# Patient Record
Sex: FEMALE | Race: WHITE | NOT HISPANIC OR LATINO
[De-identification: names, ages, dates, MRNs, and addresses within clinical notes are randomized per-mention and may not be internally consistent; named-entity substitution may affect disease eponyms.]

---

## 2017-01-04 ENCOUNTER — RX ONLY (OUTPATIENT)
Age: 62
Setting detail: RX ONLY
End: 2017-01-04

## 2017-04-07 DIAGNOSIS — Z01.812 PRE-OPERATIVE LABORATORY EXAMINATION: ICD-10-CM

## 2017-04-07 DIAGNOSIS — Z01.810 PRE-OPERATIVE CARDIOVASCULAR EXAMINATION: ICD-10-CM

## 2017-04-07 LAB
ANION GAP SERPL CALC-SCNC: 9 MMOL/L (ref 0–11.9)
BUN SERPL-MCNC: 11 MG/DL (ref 8–22)
CALCIUM SERPL-MCNC: 9.3 MG/DL (ref 8.4–10.2)
CHLORIDE SERPL-SCNC: 106 MMOL/L (ref 96–112)
CO2 SERPL-SCNC: 25 MMOL/L (ref 20–33)
CREAT SERPL-MCNC: 0.43 MG/DL (ref 0.5–1.4)
EKG IMPRESSION: NORMAL
ERYTHROCYTE [DISTWIDTH] IN BLOOD BY AUTOMATED COUNT: 39.9 FL (ref 35.9–50)
GFR SERPL CREATININE-BSD FRML MDRD: >60 ML/MIN/1.73 M 2
GLUCOSE SERPL-MCNC: 192 MG/DL (ref 65–99)
HCT VFR BLD AUTO: 45.1 % (ref 37–47)
HGB BLD-MCNC: 15.9 G/DL (ref 12–16)
MCH RBC QN AUTO: 30.2 PG (ref 27–33)
MCHC RBC AUTO-ENTMCNC: 35.3 G/DL (ref 33.6–35)
MCV RBC AUTO: 85.7 FL (ref 81.4–97.8)
PLATELET # BLD AUTO: 273 K/UL (ref 164–446)
PMV BLD AUTO: 9.8 FL (ref 9–12.9)
POTASSIUM SERPL-SCNC: 4.2 MMOL/L (ref 3.6–5.5)
RBC # BLD AUTO: 5.26 M/UL (ref 4.2–5.4)
SODIUM SERPL-SCNC: 140 MMOL/L (ref 135–145)
WBC # BLD AUTO: 7.8 K/UL (ref 4.8–10.8)

## 2017-04-07 PROCEDURE — 36415 COLL VENOUS BLD VENIPUNCTURE: CPT

## 2017-04-07 PROCEDURE — 85027 COMPLETE CBC AUTOMATED: CPT

## 2017-04-07 PROCEDURE — 80048 BASIC METABOLIC PNL TOTAL CA: CPT

## 2017-04-07 RX ORDER — RANITIDINE 150 MG/1
150 TABLET ORAL PRN
COMMUNITY
End: 2020-01-03

## 2017-04-07 RX ORDER — INSULIN GLARGINE 100 [IU]/ML
32 INJECTION, SOLUTION SUBCUTANEOUS 2 TIMES DAILY
COMMUNITY
End: 2020-01-03

## 2017-04-07 RX ORDER — ALBUTEROL SULFATE 90 UG/1
2 AEROSOL, METERED RESPIRATORY (INHALATION) EVERY 6 HOURS PRN
COMMUNITY
End: 2018-04-05

## 2017-04-07 NOTE — OR NURSING
Pre-admit appointment completed. Pt instructed to continue regularly prescribed medications through the day before surgery. Pt instructed to take the following medications the day of surgery with a sip of water, per anesthesia protocol; If needed-albuterol MDI and zantac.     Pt to bring rescue inhaler DOS.     Pt's AM glucoses run . Pt instructed to discuss Lantus dose day prior to procedure due to NPO status.

## 2017-04-11 ENCOUNTER — APPOINTMENT (OUTPATIENT)
Dept: ADMISSIONS | Facility: MEDICAL CENTER | Age: 62
End: 2017-04-11
Payer: MEDICARE

## 2017-04-13 ENCOUNTER — HOSPITAL ENCOUNTER (OUTPATIENT)
Facility: MEDICAL CENTER | Age: 62
End: 2017-04-13
Attending: INTERNAL MEDICINE | Admitting: INTERNAL MEDICINE
Payer: MEDICARE

## 2017-04-13 VITALS
TEMPERATURE: 97.7 F | RESPIRATION RATE: 16 BRPM | DIASTOLIC BLOOD PRESSURE: 87 MMHG | OXYGEN SATURATION: 95 % | WEIGHT: 161.38 LBS | HEIGHT: 62 IN | SYSTOLIC BLOOD PRESSURE: 144 MMHG | BODY MASS INDEX: 29.7 KG/M2

## 2017-04-13 PROBLEM — C25.9 PANCREATIC CANCER (HCC): Status: ACTIVE | Noted: 2017-04-13

## 2017-04-13 LAB — GLUCOSE BLD-MCNC: 116 MG/DL (ref 65–99)

## 2017-04-13 PROCEDURE — 160002 HCHG RECOVERY MINUTES (STAT): Performed by: INTERNAL MEDICINE

## 2017-04-13 PROCEDURE — A4606 OXYGEN PROBE USED W OXIMETER: HCPCS | Performed by: INTERNAL MEDICINE

## 2017-04-13 PROCEDURE — 502240 HCHG MISC OR SUPPLY RC 0272: Performed by: INTERNAL MEDICINE

## 2017-04-13 PROCEDURE — 700111 HCHG RX REV CODE 636 W/ 250 OVERRIDE (IP)

## 2017-04-13 PROCEDURE — 700101 HCHG RX REV CODE 250

## 2017-04-13 PROCEDURE — 700105 HCHG RX REV CODE 258: Performed by: INTERNAL MEDICINE

## 2017-04-13 PROCEDURE — 82962 GLUCOSE BLOOD TEST: CPT

## 2017-04-13 PROCEDURE — 160035 HCHG PACU - 1ST 60 MINS PHASE I: Performed by: INTERNAL MEDICINE

## 2017-04-13 PROCEDURE — 160036 HCHG PACU - EA ADDL 30 MINS PHASE I: Performed by: INTERNAL MEDICINE

## 2017-04-13 PROCEDURE — 160025 RECOVERY II MINUTES (STATS): Performed by: INTERNAL MEDICINE

## 2017-04-13 PROCEDURE — 160046 HCHG PACU - 1ST 60 MINS PHASE II: Performed by: INTERNAL MEDICINE

## 2017-04-13 PROCEDURE — 160009 HCHG ANES TIME/MIN: Performed by: INTERNAL MEDICINE

## 2017-04-13 PROCEDURE — 160203 HCHG ENDO MINUTES - 1ST 30 MINS LEVEL 4: Performed by: INTERNAL MEDICINE

## 2017-04-13 PROCEDURE — 500066 HCHG BITE BLOCK, ECT: Performed by: INTERNAL MEDICINE

## 2017-04-13 PROCEDURE — 160048 HCHG OR STATISTICAL LEVEL 1-5: Performed by: INTERNAL MEDICINE

## 2017-04-13 RX ORDER — SODIUM CHLORIDE 9 MG/ML
1000 INJECTION, SOLUTION INTRAVENOUS
Status: DISCONTINUED | OUTPATIENT
Start: 2017-04-13 | End: 2017-04-13 | Stop reason: HOSPADM

## 2017-04-13 RX ORDER — LIDOCAINE HYDROCHLORIDE 10 MG/ML
INJECTION, SOLUTION INFILTRATION; PERINEURAL
Status: DISCONTINUED
Start: 2017-04-13 | End: 2017-04-13 | Stop reason: HOSPADM

## 2017-04-13 RX ADMIN — SODIUM CHLORIDE 1000 ML: 900 INJECTION, SOLUTION INTRAVENOUS at 06:53

## 2017-04-13 ASSESSMENT — PAIN SCALES - GENERAL
PAINLEVEL_OUTOF10: 0

## 2017-04-13 NOTE — IP AVS SNAPSHOT
4/13/2017    Denise Gaines  [address] [address]  [facility] [address]    Dear Denise:    [facility] wants to ensure your discharge home is safe and you or your loved ones have had all of your questions answered regarding your care after you leave the hospital.    Below is a list of resources and contact information should you have any questions regarding your hospital stay, follow-up instructions, or active medical symptoms.    Questions or Concerns Regarding… Contact   Medical Questions Related to Your Discharge  (7 days a week, 8am-5pm) Contact a Nurse Care Coordinator   [phone]   Medical Questions Not Related to Your Discharge  (24 hours a day / 7 days a week)  Contact the Nurse Health Line   [phone]    Medications or Discharge Instructions Refer to your discharge packet   or contact your [facility] Primary Care Provider   [phone]   Follow-up Appointment(s) Schedule your appointment via ieCrowd   or contact Scheduling [phone]   Billing Review your statement via ieCrowd  or contact Billing [phone]   Medical Records Review your records via ieCrowd   or contact Medical Records [phone]     You may receive a telephone call within two days of discharge. This call is to make certain you understand your discharge instructions and have the opportunity to have any questions answered. You can also easily access your medical information, test results and upcoming appointments via the ieCrowd free online health management tool. You can learn more and sign up at [employer]/ieCrowd. For assistance setting up your ieCrowd account, please call [phone].    Once again, we want to ensure your discharge home is safe and that you have a clear understanding of any next steps in your care. If you have any questions or concerns, please do not hesitate to contact us, we are here for you. Thank you for choosing [facility] for your healthcare needs.    Sincerely,    Your [facility] Healthcare Team

## 2017-04-13 NOTE — OR NURSING
0839 Patient to pacu via cart. Placed on monitors. Oral airway remains in place. Spontaneous respirations.   0849 Oral airway removed. Patient awake and alert.  0854 Patient awake, denies any pain at this time. Resting comfortably on her side.  0910 Warm wash cloth placed to patients forehead. States she has a headache and feels congested. States she felt this way prior to procedure too.  0926 Patient taking sips of water.   0940 Patients family aware of patients disposition.   0955 Report to Katarzyna to assume care of patient in stage 2.

## 2017-04-13 NOTE — OP REPORT
DATE OF SERVICE:  04/13/2017    PROCEDURES PERFORMED:  1.  Esophagogastrojejunoscopy.  2.  Endoscopic ultrasound of pancreas.    INDICATION FOR PROCEDURE:  This is a 61-year-old female with history of   pancreatic cancer, status post Whipple procedure.  She presents for   surveillance endoscopic ultrasound.    POST-PROCEDURE DIAGNOSES:  1.  Normal esophagogastrojejunoscopy with post-surgical anatomy consistent   with gastrojejunal anastomosis, appearing healthy and intact with appreciation   of normal afferent and efferent limbs.  2.  Endoscopic ultrasound of the pancreas, examination of the body and tail of   the pancreas appearing within normal limits.    PHYSICIAN:  Edward Rubin MD.    ANESTHESIOLOGIST:  Juice Vázquez MD.    MEDICATIONS:  Deep sedation.    CONSENT:  Procedure risks and benefits reviewed thoroughly with the patient,   risks including but not limited to bleeding, perforation, side effects of   medication were informed.  Patient voiced understanding and agreed to proceed.    DESCRIPTION OF PROCEDURE:  Patient was placed in a left lateral decubitus   position after sedation was achieved, the bite block was inserted in the mouth   and a forward viewing gastroscope was passed carefully and easily under   direct visualization into the esophagus.  The esophagus was normal.  The GE   junction was regular.  The scope was advanced into the stomach.  The stomach   extended from 35-50 cm.  There were post-surgical changes secondary to a   distal gastrectomy and a subsequent gastrojejunal anastomosis with both   afferent and efferent limbs.  Both these limbs and their anastomoses to the   stomach appeared healthy and intact.  Examination briefly into the limbs was   normal.  Please note the limited volume of the stomach being only 15 cm in   length.  The scope was removed.    ENDOSCOPIC ULTRASOUND:  A side-viewing radial echoendoscope was passed   carefully and easily under direct vision to the esophagus.   It was passed to   the GE junction station.  There, the aorta, celiac artery and superior   mesenteric artery take offs from the aorta appeared normal.  The celiac artery   and the celiac axis revealed no evidence for lymphadenopathy.  The left   adrenal gland and left kidney were normal.  The celiac artery and its   divergence into the hepatic and splenic arteries allowed for visualization of   the body and tail of the pancreas.  The body and tail of the pancreas appeared   normal, very mild atrophy.  The pancreatic duct was 0.27 cm in length.  There   appeared to be a tubular structure within the pancreatic duct likely a   pancreatic duct stent.  Otherwise, there was no evidence for any mass effect   or chronic pancreatitis type changes.  The tail of pancreas as it laid next to   the splenic hilum and the spleen were within normal limits.  The splenic vein   and its confluence to the superior mesenteric vein to form the portal vein   appeared within normal limits.  This station as well as the left hepatic lobe   were normal.  The scope was advanced distally into the stomach.  Attempts at   identifying the post-surgical bed revealed no akua abnormalities and the   scope was removed.    COMPLICATIONS:  None.    BLOOD LOSS:  None.    SPECIMENS:  None.    RECOMMENDATIONS:  1.  Normal EUS.  2.  Please note the patient's residual stomach is only 15 cm in length and she   is prone to reflux due to this limited gastric anatomy.  Antireflux measures   reviewed thoroughly with the patient.  If she has any questions she may follow   up in the clinic for further recommendations.       ____________________________________     MD VICKY Hinojosa / FREDI    DD:  04/13/2017 09:31:26  DT:  04/13/2017 09:52:10    D#:  [id number]  Job#:  [id number]    cc: EZE DARBY DO

## 2017-04-13 NOTE — IP AVS SNAPSHOT
" Home Care Instructions                                                                                                                Name:Denise Gaines  Medical Record Number:[id number]  CSN: [id number]    YOB: 1955   Age: 61 y.o.  Sex: female  HT:1.575 m (5' 2.01\") WT: 73.2 kg (161 lb 6 oz)          Admit Date: 4/13/2017     Discharge Date:   Today's Date: 4/13/2017  Attending Doctor:  Edwrad Rubin M.D.                  Allergies:  Bee; Clindamycin; Codeine; Penicillins; Percocet; Sulfa drugs; and Vicodin                Discharge Instructions       ENDOSCOPY HOME CARE INSTRUCTIONS    GASTROSCOPY OR ERCP  1. Don't eat or drink anything for about an hour after the test. You can then resume your regular diet.  2. Don't drive or drink alcohol for 24 hours. The medication you received will make you too drowsy.  3. Don't take any coffee, tea, or aspirin products until after you see your doctor. These can harm the lining of your stomach.  4. If you begin to vomit bloody material, or develop black or bloody stools, call your doctor as soon as possible.  5. If you have any neck, chest, abdominal pain or temp of 100 degrees, call your doctor.  6. See your doctor for a follow up appointment.    Dr. Rubin - [phone]      You should call [phone] if you develop problems with breathing or chest pain.  If any questions arise, call your doctor. If your doctor is not available, please feel free to call {Endoscopy Dept Numbers:[id number]}. You can also call the HEALTH HOTLINE open 24 hours/day, 7 days/week and speak to a nurse at [phone], or toll free [phone].    Depression / Suicide Risk    As you are discharged from this [facility] facility, it is important to learn how to keep safe from harming yourself.    Recognize the warning signs:  · Abrupt changes in personality, positive or negative- including increase in energy   · Giving away possessions  · Change in eating patterns- significant weight " changes-  positive or negative  · Change in sleeping patterns- unable to sleep or sleeping all the time   · Unwillingness or inability to communicate  · Depression  · Unusual sadness, discouragement and loneliness  · Talk of wanting to die  · Neglect of personal appearance   · Rebelliousness- reckless behavior  · Withdrawal from people/activities they love  · Confusion- inability to concentrate     If you or a loved one observes any of these behaviors or has concerns about self-harm, here's what you can do:  · Talk about it- your feelings and reasons for harming yourself  · Remove any means that you might use to hurt yourself (examples: pills, rope, extension cords, firearm)  · Get professional help from the community (Mental Health, Substance Abuse, psychological counseling)  · Do not be alone:Call your Safe Contact- someone whom you trust who will be there for you.  · Call your local CRISIS HOTLINE 857-6894 or 584-849-8692  · Call your local Children's Mobile Crisis Response Team [place] (020) 842-2285 or www.BIOSAFE  · Call the toll free National Suicide Prevention Hotlines   · National Suicide Prevention Lifeline 930-469-CCWD (7937)  · National Hope Line Network 800-SUICIDE (380-4768)    I acknowledge receipt and understanding of these Home Care Instructions.       Medication List      ASK your doctor about these medications        Instructions    Morning Afternoon Evening Bedtime    albuterol 108 (90 BASE) MCG/ACT Aers inhalation aerosol        Inhale 2 Puffs by mouth every 6 hours as needed for Shortness of Breath.   Dose:  2 Puff                        ALLEGRA PO        Take  by mouth as needed.                        aspirin EC 81 MG Tbec   Commonly known as:  ECOTRIN        Take 81 mg by mouth every day.   Dose:  81 mg                        atorvastatin 10 MG Tabs   Commonly known as:  LIPITOR        Take 10 mg by mouth every evening.   Dose:  10 mg                        CREON 00182 UNITS  Cpep   Generic drug:  Pancrelipase (Lip-Prot-Amyl)        Take 2 Tabs by mouth 3 times a day, with meals.   Dose:  2 Tab                        fluticasone 50 MCG/ACT nasal spray   Commonly known as:  FLONASE        Spray 2 Sprays in nose 2 times a day.   Dose:  2 Spray                        GAS-X PO        Take  by mouth.                        insulin glargine 100 UNIT/ML Soln   Commonly known as:  LANTUS        Inject 25 Units as instructed every evening. TAKES at 3:30pm   Dose:  25 Units                        MUCINEX D PO        Take  by mouth as needed.                        MULTIVITAMIN ADULT PO        Take  by mouth every day.                        NEXIUM 40 MG delayed-release capsule   Generic drug:  esomeprazole        Take 40 mg by mouth every evening.   Dose:  40 mg                        PROBIOTIC ADVANCED PO        Take  by mouth every day.                        ranitidine 150 MG Tabs   Commonly known as:  ZANTAC        Take 150 mg by mouth as needed for Heartburn.   Dose:  150 mg                        ZOLOFT 100 MG Tabs   Generic drug:  sertraline        Take 150 mg by mouth every morning.   Dose:  150 mg                                Medication Information     Above and/or attached are the medications your physician expects you to take upon discharge. Review all of your home medications and newly ordered medications with your doctor and/or pharmacist. Follow medication instructions as directed by your doctor and/or pharmacist. Please keep your medication list with you and share with your physician. Update the information when medications are discontinued, doses are changed, or new medications (including over-the-counter products) are added; and carry medication information at all times in the event of emergency situations.        Resources     Quit Smoking / Tobacco Use:    I understand the use of any tobacco products increases my chance of suffering from future heart disease or stroke and  could cause other illnesses which may shorten my life. Quitting the use of tobacco products is the single most important thing I can do to improve my health. For further information on smoking / tobacco cessation call a Toll Free Quit Line at 1-440.645.1008 (*National Cancer [place]) or 1-493.320.8746 (American Lung Association) or you can access the web based program at www.lungusa.org.    [place] Tobacco Users Help Line:  (153) 939-2990       Toll Free: 1-509.922.6447  Quit Tobacco Program [facility] (823)178-3831    Crisis Hotline:    [place] Crisis Hotline:  4-965-PAEUMGP or 1-268.911.2157    [place] Crisis Hotline:    1-795.111.6827 or 447-746-2363    Discharge Survey:   Thank you for choosing [facility]. We hope we did everything we could to make your hospital stay a pleasant one. You may be receiving a survey and we would appreciate your time and participation in answering the questions. Your input is very valuable to us in our efforts to improve our service to our patients and their families.            Signatures     My signature on this form indicates that:    1. I acknowledge receipt and understanding of these Home Care Instruction.  2. My questions regarding this information have been answered to my satisfaction.  3. I have formulated a plan with my discharge nurse to obtain my prescribed medications for home.    __________________________________      __________________________________                   Patient Signature                                 Guardian/Responsible Adult Signature      __________________________________                 __________       ________                       Nurse Signature                                               Date                 Time

## 2017-04-13 NOTE — DISCHARGE INSTRUCTIONS
ENDOSCOPY HOME CARE INSTRUCTIONS    GASTROSCOPY OR ERCP  1. Don't eat or drink anything for about an hour after the test. You can then resume your regular diet.  2. Don't drive or drink alcohol for 24 hours. The medication you received will make you too drowsy.  3. Don't take any coffee, tea, or aspirin products until after you see your doctor. These can harm the lining of your stomach.  4. If you begin to vomit bloody material, or develop black or bloody stools, call your doctor as soon as possible.  5. If you have any neck, chest, abdominal pain or temp of 100 degrees, call your doctor.  6. See your doctor for a follow up appointment.    Dr. Rubin - [phone]      You should call [phone] if you develop problems with breathing or chest pain.  If any questions arise, call your doctor. If your doctor is not available, please feel free to call {Endoscopy Dept Numbers:[id number]}. You can also call the HEALTH HOTLINE open 24 hours/day, 7 days/week and speak to a nurse at [phone], or toll free [phone].    Depression / Suicide Risk    As you are discharged from this [facility] facility, it is important to learn how to keep safe from harming yourself.    Recognize the warning signs:  · Abrupt changes in personality, positive or negative- including increase in energy   · Giving away possessions  · Change in eating patterns- significant weight changes-  positive or negative  · Change in sleeping patterns- unable to sleep or sleeping all the time   · Unwillingness or inability to communicate  · Depression  · Unusual sadness, discouragement and loneliness  · Talk of wanting to die  · Neglect of personal appearance   · Rebelliousness- reckless behavior  · Withdrawal from people/activities they love  · Confusion- inability to concentrate     If you or a loved one observes any of these behaviors or has concerns about self-harm, here's what you can do:  · Talk about it- your feelings and reasons for harming  yourself  · Remove any means that you might use to hurt yourself (examples: pills, rope, extension cords, firearm)  · Get professional help from the community (Mental Health, Substance Abuse, psychological counseling)  · Do not be alone:Call your Safe Contact- someone whom you trust who will be there for you.  · Call your local CRISIS HOTLINE 483-7339 or 672-740-6876  · Call your local Children's Mobile Crisis Response Team [place] (087) 961-0255 or www.[place]  · Call the toll free National Suicide Prevention Hotlines   · National Suicide Prevention Lifeline 662-566-JROM (5569)  · National Hope Line Network 800-SUICIDE (871-1228)    I acknowledge receipt and understanding of these Home Care Instructions.

## 2017-04-24 ENCOUNTER — APPOINTMENT (OUTPATIENT)
Dept: RADIOLOGY | Facility: MEDICAL CENTER | Age: 62
End: 2017-04-24
Attending: ORTHOPAEDIC SURGERY
Payer: MEDICARE

## 2017-04-24 DIAGNOSIS — M25.511 RIGHT SHOULDER PAIN, UNSPECIFIED CHRONICITY: ICD-10-CM

## 2017-04-24 PROCEDURE — 73221 MRI JOINT UPR EXTREM W/O DYE: CPT | Mod: RT

## 2017-07-03 ENCOUNTER — HOSPITAL ENCOUNTER (OUTPATIENT)
Dept: RADIOLOGY | Facility: MEDICAL CENTER | Age: 62
End: 2017-07-03

## 2017-07-04 ENCOUNTER — HOSPITAL ENCOUNTER (OUTPATIENT)
Dept: RADIOLOGY | Facility: MEDICAL CENTER | Age: 62
End: 2017-07-04

## 2017-07-05 ENCOUNTER — HOSPITAL ENCOUNTER (OUTPATIENT)
Dept: RADIOLOGY | Facility: MEDICAL CENTER | Age: 62
End: 2017-07-05

## 2017-08-22 ENCOUNTER — HOSPITAL ENCOUNTER (OUTPATIENT)
Dept: RADIOLOGY | Facility: MEDICAL CENTER | Age: 62
End: 2017-08-22
Attending: FAMILY MEDICINE
Payer: MEDICARE

## 2017-08-22 DIAGNOSIS — M81.0 SENILE OSTEOPOROSIS: ICD-10-CM

## 2017-08-22 DIAGNOSIS — Z12.31 VISIT FOR SCREENING MAMMOGRAM: ICD-10-CM

## 2017-08-22 PROCEDURE — 77063 BREAST TOMOSYNTHESIS BI: CPT

## 2017-08-22 PROCEDURE — 77080 DXA BONE DENSITY AXIAL: CPT

## 2017-11-02 PROCEDURE — 90715 TDAP VACCINE 7 YRS/> IM: CPT | Performed by: PREVENTIVE MEDICINE

## 2017-11-03 ENCOUNTER — HOSPITAL ENCOUNTER (OUTPATIENT)
Dept: LAB | Facility: MEDICAL CENTER | Age: 62
End: 2017-11-03
Attending: PREVENTIVE MEDICINE
Payer: COMMERCIAL

## 2017-11-03 ENCOUNTER — EH NON-PROVIDER (OUTPATIENT)
Dept: OCCUPATIONAL MEDICINE | Facility: CLINIC | Age: 62
End: 2017-11-03

## 2017-11-03 DIAGNOSIS — Z02.89 VISIT FOR OCCUPATIONAL HEALTH EXAMINATION: ICD-10-CM

## 2017-11-03 PROCEDURE — 86762 RUBELLA ANTIBODY: CPT | Performed by: PREVENTIVE MEDICINE

## 2017-11-03 PROCEDURE — 86735 MUMPS ANTIBODY: CPT | Performed by: PREVENTIVE MEDICINE

## 2017-11-03 PROCEDURE — 86480 TB TEST CELL IMMUN MEASURE: CPT | Performed by: PREVENTIVE MEDICINE

## 2017-11-03 PROCEDURE — 86765 RUBEOLA ANTIBODY: CPT | Performed by: PREVENTIVE MEDICINE

## 2017-11-04 LAB
MEV IGG SER IA-ACNC: POSITIVE
MUV IGG SER IA-ACNC: POSITIVE
RUBV AB SER QL: 29.6 IU/ML

## 2017-11-05 LAB
M TB TUBERC IFN-G BLD QL: NEGATIVE
M TB TUBERC IFN-G/MITOGEN IGNF BLD: 0.02
M TB TUBERC IGNF/MITOGEN IGNF CONTROL: 39.74 [IU]/ML
MITOGEN IGNF BCKGRD COR BLD-ACNC: 0.04 [IU]/ML

## 2018-02-26 ENCOUNTER — HOSPITAL ENCOUNTER (OUTPATIENT)
Dept: LAB | Facility: MEDICAL CENTER | Age: 63
End: 2018-02-26
Attending: INTERNAL MEDICINE
Payer: MEDICARE

## 2018-02-26 LAB
BUN SERPL-MCNC: 12 MG/DL (ref 8–22)
CREAT SERPL-MCNC: 0.58 MG/DL (ref 0.5–1.4)

## 2018-02-26 PROCEDURE — 82565 ASSAY OF CREATININE: CPT

## 2018-02-26 PROCEDURE — 36415 COLL VENOUS BLD VENIPUNCTURE: CPT

## 2018-02-26 PROCEDURE — 84520 ASSAY OF UREA NITROGEN: CPT

## 2018-02-27 ENCOUNTER — HOSPITAL ENCOUNTER (OUTPATIENT)
Dept: RADIOLOGY | Facility: MEDICAL CENTER | Age: 63
End: 2018-02-27
Attending: INTERNAL MEDICINE
Payer: MEDICARE

## 2018-02-27 ENCOUNTER — HOSPITAL ENCOUNTER (OUTPATIENT)
Dept: LAB | Facility: MEDICAL CENTER | Age: 63
End: 2018-02-27
Attending: INTERNAL MEDICINE
Payer: MEDICARE

## 2018-02-27 DIAGNOSIS — C25.9 MALIGNANT NEOPLASM OF PANCREAS, UNSPECIFIED LOCATION OF MALIGNANCY (HCC): ICD-10-CM

## 2018-02-27 LAB
ALBUMIN SERPL BCP-MCNC: 3.4 G/DL (ref 3.2–4.9)
ALBUMIN/GLOB SERPL: 0.8 G/DL
ALP SERPL-CCNC: 106 U/L (ref 30–99)
ALT SERPL-CCNC: 17 U/L (ref 2–50)
AMYLASE SERPL-CCNC: 10 U/L (ref 20–103)
ANION GAP SERPL CALC-SCNC: 10 MMOL/L (ref 0–11.9)
AST SERPL-CCNC: 25 U/L (ref 12–45)
BASOPHILS # BLD AUTO: 0.5 % (ref 0–1.8)
BASOPHILS # BLD: 0.07 K/UL (ref 0–0.12)
BILIRUB SERPL-MCNC: 0.5 MG/DL (ref 0.1–1.5)
BUN SERPL-MCNC: 13 MG/DL (ref 8–22)
CALCIUM SERPL-MCNC: 8.9 MG/DL (ref 8.5–10.5)
CHLORIDE SERPL-SCNC: 98 MMOL/L (ref 96–112)
CO2 SERPL-SCNC: 28 MMOL/L (ref 20–33)
CREAT SERPL-MCNC: 0.47 MG/DL (ref 0.5–1.4)
EOSINOPHIL # BLD AUTO: 0.11 K/UL (ref 0–0.51)
EOSINOPHIL NFR BLD: 0.8 % (ref 0–6.9)
ERYTHROCYTE [DISTWIDTH] IN BLOOD BY AUTOMATED COUNT: 38.4 FL (ref 35.9–50)
GLOBULIN SER CALC-MCNC: 4.1 G/DL (ref 1.9–3.5)
GLUCOSE SERPL-MCNC: 153 MG/DL (ref 65–99)
HCT VFR BLD AUTO: 43.9 % (ref 37–47)
HGB BLD-MCNC: 14.3 G/DL (ref 12–16)
IMM GRANULOCYTES # BLD AUTO: 0.07 K/UL (ref 0–0.11)
IMM GRANULOCYTES NFR BLD AUTO: 0.5 % (ref 0–0.9)
LIPASE SERPL-CCNC: 3 U/L (ref 11–82)
LYMPHOCYTES # BLD AUTO: 1.75 K/UL (ref 1–4.8)
LYMPHOCYTES NFR BLD: 12.7 % (ref 22–41)
MCH RBC QN AUTO: 28.9 PG (ref 27–33)
MCHC RBC AUTO-ENTMCNC: 32.6 G/DL (ref 33.6–35)
MCV RBC AUTO: 88.7 FL (ref 81.4–97.8)
MONOCYTES # BLD AUTO: 0.72 K/UL (ref 0–0.85)
MONOCYTES NFR BLD AUTO: 5.2 % (ref 0–13.4)
NEUTROPHILS # BLD AUTO: 11.03 K/UL (ref 2–7.15)
NEUTROPHILS NFR BLD: 80.3 % (ref 44–72)
NRBC # BLD AUTO: 0 K/UL
NRBC BLD-RTO: 0 /100 WBC
PLATELET # BLD AUTO: 416 K/UL (ref 164–446)
PMV BLD AUTO: 10.3 FL (ref 9–12.9)
POTASSIUM SERPL-SCNC: 4 MMOL/L (ref 3.6–5.5)
PROT SERPL-MCNC: 7.5 G/DL (ref 6–8.2)
RBC # BLD AUTO: 4.95 M/UL (ref 4.2–5.4)
SODIUM SERPL-SCNC: 136 MMOL/L (ref 135–145)
WBC # BLD AUTO: 13.8 K/UL (ref 4.8–10.8)

## 2018-02-27 PROCEDURE — 80053 COMPREHEN METABOLIC PANEL: CPT

## 2018-02-27 PROCEDURE — 74170 CT ABD WO CNTRST FLWD CNTRST: CPT

## 2018-02-27 PROCEDURE — 85025 COMPLETE CBC W/AUTO DIFF WBC: CPT

## 2018-02-27 PROCEDURE — 36415 COLL VENOUS BLD VENIPUNCTURE: CPT

## 2018-02-27 PROCEDURE — 82150 ASSAY OF AMYLASE: CPT

## 2018-02-27 PROCEDURE — 83690 ASSAY OF LIPASE: CPT

## 2018-02-27 PROCEDURE — 700117 HCHG RX CONTRAST REV CODE 255: Performed by: INTERNAL MEDICINE

## 2018-02-27 RX ADMIN — IOHEXOL 100 ML: 350 INJECTION, SOLUTION INTRAVENOUS at 15:29

## 2018-03-06 ENCOUNTER — HOSPITAL ENCOUNTER (OUTPATIENT)
Dept: RADIOLOGY | Facility: MEDICAL CENTER | Age: 63
End: 2018-03-06
Attending: INTERNAL MEDICINE
Payer: MEDICARE

## 2018-03-06 DIAGNOSIS — R10.9 ABDOMINAL PAIN, UNSPECIFIED ABDOMINAL LOCATION: ICD-10-CM

## 2018-03-06 DIAGNOSIS — C7A.8 NEUROENDOCRINE MALIGNANCY (HCC): ICD-10-CM

## 2018-03-07 ENCOUNTER — HOSPITAL ENCOUNTER (OUTPATIENT)
Dept: RADIOLOGY | Facility: MEDICAL CENTER | Age: 63
End: 2018-03-07
Attending: INTERNAL MEDICINE
Payer: MEDICARE

## 2018-03-08 ENCOUNTER — HOSPITAL ENCOUNTER (OUTPATIENT)
Dept: RADIOLOGY | Facility: MEDICAL CENTER | Age: 63
End: 2018-03-08
Attending: INTERNAL MEDICINE
Payer: MEDICARE

## 2018-03-08 ENCOUNTER — APPOINTMENT (OUTPATIENT)
Dept: ADMISSIONS | Facility: MEDICAL CENTER | Age: 63
End: 2018-03-08
Attending: INTERNAL MEDICINE
Payer: MEDICARE

## 2018-03-08 DIAGNOSIS — Z01.810 PRE-OPERATIVE CARDIOVASCULAR EXAMINATION: ICD-10-CM

## 2018-03-08 DIAGNOSIS — Z01.812 PRE-OPERATIVE LABORATORY EXAMINATION: ICD-10-CM

## 2018-03-08 LAB — EKG IMPRESSION: NORMAL

## 2018-03-08 PROCEDURE — A9572 INDIUM IN-111 PENTETREOTIDE: HCPCS

## 2018-03-08 PROCEDURE — 93005 ELECTROCARDIOGRAM TRACING: CPT

## 2018-03-08 PROCEDURE — 93010 ELECTROCARDIOGRAM REPORT: CPT | Performed by: INTERNAL MEDICINE

## 2018-03-08 RX ORDER — TRAMADOL HYDROCHLORIDE 50 MG/1
50 TABLET ORAL EVERY 4 HOURS PRN
COMMUNITY
End: 2018-04-05

## 2018-03-08 RX ORDER — ERGOCALCIFEROL (VITAMIN D2) 10 MCG
800 TABLET ORAL EVERY EVENING
COMMUNITY
End: 2021-10-06

## 2018-03-08 NOTE — OR NURSING
"Preadmit appointment: \" Preparing for your Procedure information\" sheet given to patient with verbal and written instructions. Patient instructed to continue prescribed medications through the day before surgery, instructed to take the following medications the day of surgery per anesthesia protocol: tramadol if needed, raniditine if needed, Albuterol if needed and pt instructed to bring inhaler day of procedure  "

## 2018-03-15 ENCOUNTER — HOSPITAL ENCOUNTER (OUTPATIENT)
Facility: MEDICAL CENTER | Age: 63
End: 2018-03-15
Attending: INTERNAL MEDICINE | Admitting: INTERNAL MEDICINE
Payer: MEDICARE

## 2018-03-15 VITALS
OXYGEN SATURATION: 92 % | WEIGHT: 152.56 LBS | HEIGHT: 62 IN | BODY MASS INDEX: 28.07 KG/M2 | RESPIRATION RATE: 16 BRPM | SYSTOLIC BLOOD PRESSURE: 124 MMHG | DIASTOLIC BLOOD PRESSURE: 70 MMHG | HEART RATE: 94 BPM | TEMPERATURE: 97.3 F

## 2018-03-15 LAB
GLUCOSE BLD-MCNC: 214 MG/DL (ref 65–99)
GLUCOSE BLD-MCNC: 262 MG/DL (ref 65–99)
PATHOLOGY CONSULT NOTE: NORMAL

## 2018-03-15 PROCEDURE — 160035 HCHG PACU - 1ST 60 MINS PHASE I: Performed by: INTERNAL MEDICINE

## 2018-03-15 PROCEDURE — 160208 HCHG ENDO MINUTES - EA ADDL 1 MIN LEVEL 4: Performed by: INTERNAL MEDICINE

## 2018-03-15 PROCEDURE — 88173 CYTOPATH EVAL FNA REPORT: CPT

## 2018-03-15 PROCEDURE — 700111 HCHG RX REV CODE 636 W/ 250 OVERRIDE (IP)

## 2018-03-15 PROCEDURE — 500066 HCHG BITE BLOCK, ECT: Performed by: INTERNAL MEDICINE

## 2018-03-15 PROCEDURE — 700105 HCHG RX REV CODE 258: Performed by: INTERNAL MEDICINE

## 2018-03-15 PROCEDURE — 160009 HCHG ANES TIME/MIN: Performed by: INTERNAL MEDICINE

## 2018-03-15 PROCEDURE — 700101 HCHG RX REV CODE 250

## 2018-03-15 PROCEDURE — 88342 IMHCHEM/IMCYTCHM 1ST ANTB: CPT

## 2018-03-15 PROCEDURE — 160025 RECOVERY II MINUTES (STATS): Performed by: INTERNAL MEDICINE

## 2018-03-15 PROCEDURE — 160002 HCHG RECOVERY MINUTES (STAT): Performed by: INTERNAL MEDICINE

## 2018-03-15 PROCEDURE — 88307 TISSUE EXAM BY PATHOLOGIST: CPT

## 2018-03-15 PROCEDURE — 88305 TISSUE EXAM BY PATHOLOGIST: CPT

## 2018-03-15 PROCEDURE — 88341 IMHCHEM/IMCYTCHM EA ADD ANTB: CPT | Mod: 91

## 2018-03-15 PROCEDURE — 82962 GLUCOSE BLOOD TEST: CPT

## 2018-03-15 PROCEDURE — 160048 HCHG OR STATISTICAL LEVEL 1-5: Performed by: INTERNAL MEDICINE

## 2018-03-15 PROCEDURE — 160203 HCHG ENDO MINUTES - 1ST 30 MINS LEVEL 4: Performed by: INTERNAL MEDICINE

## 2018-03-15 PROCEDURE — 160046 HCHG PACU - 1ST 60 MINS PHASE II: Performed by: INTERNAL MEDICINE

## 2018-03-15 RX ORDER — LIDOCAINE HYDROCHLORIDE 10 MG/ML
INJECTION, SOLUTION INFILTRATION; PERINEURAL
Status: COMPLETED
Start: 2018-03-15 | End: 2018-03-15

## 2018-03-15 RX ORDER — SODIUM CHLORIDE, SODIUM LACTATE, POTASSIUM CHLORIDE, CALCIUM CHLORIDE 600; 310; 30; 20 MG/100ML; MG/100ML; MG/100ML; MG/100ML
INJECTION, SOLUTION INTRAVENOUS
Status: DISCONTINUED | OUTPATIENT
Start: 2018-03-15 | End: 2018-03-15 | Stop reason: HOSPADM

## 2018-03-15 RX ADMIN — LIDOCAINE HYDROCHLORIDE 0.2 ML: 10 INJECTION, SOLUTION INFILTRATION; PERINEURAL at 10:07

## 2018-03-15 RX ADMIN — SODIUM CHLORIDE, POTASSIUM CHLORIDE, SODIUM LACTATE AND CALCIUM CHLORIDE: 600; 310; 30; 20 INJECTION, SOLUTION INTRAVENOUS at 10:07

## 2018-03-15 ASSESSMENT — PAIN SCALES - GENERAL
PAINLEVEL_OUTOF10: 0
PAINLEVEL_OUTOF10: 6

## 2018-03-15 NOTE — PROCEDURES
DATE OF SERVICE:  03/15/2018    PROCEDURE PERFORMED:  Endoscopic ultrasound of the pancreas with fine needle   aspiration of 2 sites, the pancreatic bed where the resection has been   performed and the tail of the pancreas.    INDICATION FOR PROCEDURE:  This is a 62-year-old female who presented in 2013   with obstructive jaundice.  We ended up doing an endoscopic ultrasound at that   time, which was positive for neuroendocrine tumor and in January 2014, she   underwent the pancreaticoduodenectomy with removal of neuroendocrine tumor   with negative nodes.  We have been doing surveillance CT scans and   surveillance octreotide scans without any change.  Patient presented for   endoscopic ultrasound surveillances in 2015 as well as in 2017, and at that   time, all we appreciated on endoscopic ultrasound was post-surgical change   from Whipple; otherwise, the EUS was normal.  Almost a year later, in February 2018, patient reported a 4-week history of epigastric and lower left quadrant   abdominal pain with radiation to the back, night sweats and a 10-pound weight   loss as well as inability to control her sugars.  The patient already has   diabetes and takes medical therapy for this.  We obtained an octreotide scan   as well as a CT scan for further evaluation.  The octreotide scan was   negative.  The CT scan revealed a 2.4x2.0 cm abnormality in the tail of   pancreas as well as post-surgical changes consistent with prior Whipple.    Patient presents for endoscopic ultrasound.    POSTPROCEDURE DIAGNOSES:  1.  Indistinct abnormality at the pancreatic bed.  2.  An 18x14.7 mm septated/fibrotic lesion within the tail of the pancreas.  3.  Fine needle aspiration of both these sites.    PHYSICIAN:  Edward Rubin MD    ANESTHESIOLOGIST:  Harriet Gan MD    MEDICATIONS:  Deep sedation.    CONSENT:  Procedure risks and benefits reviewed thoroughly with the patient,   risks including but not limited to bleeding,  perforation, side effects of   medication were informed.  Patient voiced understanding and agreed to proceed.    Additional risks inherent to the procedure that being infection,   pancreatitis was also reviewed.  Patient voiced understanding and agreed to   proceed.    DESCRIPTION OF PROCEDURE:  ENDOSCOPIC ULTRASOUND:  The side-viewing radial echoendoscope was passed   carefully and easily under indirect visualization into the esophagus, passed   to the GE junction station.  There, the aorta, celiac artery, celiac axis were   within normal limits.  Examining the celiac artery to the hepatic and splenic   arteries allowed for examination of the residual pancreas.  There were   post-surgical changes consistent with Whipple.  Within that pancreatic bed,   the parenchyma was slightly irregular.  As examined closer to the tail of   pancreas, an indistinct septated lesion was appreciated.  It measured 18x14.7   cm.  Within the pancreatic bed, there was appreciation of a previously placed   pancreatic duct stent.    FINE NEEDLE ASPIRATION:  A side-viewing linear echoendoscope was passed   carefully and easily under indirect visualization into the esophagus, passed   to the GE junction station.  Both of those areas, the pancreatic bed as well   as the lesion within the tail of pancreas were sampled individually with 2   separate needles and samples were obtained; 5 passes into the pancreatic bed   and 4 passes into the tail were performed allowing for slides as well as core.    The lesion in the tail of pancreas upon first sampling was sampled with a 22   gauge needle and after the stylet was removed, a syringe was placed for   suctioning.  No decompression of this area was appreciated.  It does not   appear to be a cystic lesion.  Samples were obtained, appeared somewhat   consistent with core.  The stomach was suctioned, desufflated, the scope was   removed.    COMPLICATIONS:  None.    BLOOD LOSS:  None.    SPECIMENS:   Obtained.    RECOMMENDATIONS:  Reviewed results of the histopathology obtained from 2 sites   that being the pancreatic bed as well as within the tail of the pancreas.    Further recommendation is to follow.  The above findings and recommendations   reviewed thoroughly with the patient as well as the patient's son.       ____________________________________     Edward MD VICKY Molina / FREDI    DD:  03/15/2018 12:51:27  DT:  03/15/2018 13:47:27    D#:  [id number]  Job#:  [id number]

## 2018-03-15 NOTE — OR NURSING
1231 Patient to recovery via cart. Placed on monitors. Oral airway remains in place. Spontaneous respirations.  1242 Blood sugar 214.  1244 Oral airway removed. Patient awake.   1300 Patient sitting up in bed. Denies any pain or nausea at this time.  1320 Report to Tatum Rowan to assume care of patient in stage 2.

## 2018-03-15 NOTE — OR NURSING
0975 PT TO PRE OP TO ASSUME CARE.  4921 Patient allergies and NPO status verified, home medication reconciliation completed and belongings secured. Patient verbalizes understanding of pain scale, expected course of stay and plan of care. Surgical site verified with patient. IV access established.

## 2018-03-15 NOTE — OR NURSING
1325  Pt to stage two via ghanshyam. Pt denies pain and nausea at this time. Pt getting dressed with help of CNA.   1330  Pt up to chair with help of CNA. VSS.   1343 Explained discharge instructions to pt and pts son . Both express understanding   1347 Pt meets criteria to be discharged after uneventful stay in stage two

## 2018-03-15 NOTE — DISCHARGE INSTRUCTIONS
ENDOSCOPY HOME CARE INSTRUCTIONS    GASTROSCOPY OR ERCP  1. Don't eat or drink anything for about an hour after the test. You can then resume your regular diet.  2. Don't drive or drink alcohol for 24 hours. The medication you received will make you too drowsy.  3. Don't take any coffee, tea, or aspirin products until after you see your doctor. These can harm the lining of your stomach.  4. If you begin to vomit bloody material, or develop black or bloody stools, call your doctor as soon as possible.  5. If you have any neck, chest, abdominal pain or temp of 100 degrees, call your doctor.  6. See your doctor for a follow up.  Dr Rubin [phone]    You should call [phone] if you develop problems with breathing or chest pain.  If any questions arise, call your doctor. If your doctor is not available, please feel free to call [phone]. You can also call the HEALTH HOTLINE open 24 hours/day, 7 days/week and speak to a nurse at [phone], or toll free [phone].    Depression / Suicide Risk    As you are discharged from this [facility] facility, it is important to learn how to keep safe from harming yourself.    Recognize the warning signs:  · Abrupt changes in personality, positive or negative- including increase in energy   · Giving away possessions  · Change in eating patterns- significant weight changes-  positive or negative  · Change in sleeping patterns- unable to sleep or sleeping all the time   · Unwillingness or inability to communicate  · Depression  · Unusual sadness, discouragement and loneliness  · Talk of wanting to die  · Neglect of personal appearance   · Rebelliousness- reckless behavior  · Withdrawal from people/activities they love  · Confusion- inability to concentrate     If you or a loved one observes any of these behaviors or has concerns about self-harm, here's what you can do:  · Talk about it- your feelings and reasons for harming yourself  · Remove any means that you might  use to hurt yourself (examples: pills, rope, extension cords, firearm)  · Get professional help from the community (Mental Health, Substance Abuse, psychological counseling)  · Do not be alone:Call your Safe Contact- someone whom you trust who will be there for you.  · Call your local CRISIS HOTLINE 854-6222 or 443-844-0675  · Call your local Children's Mobile Crisis Response Team [place] (696) 637-6850 or www.edjing  · Call the toll free National Suicide Prevention Hotlines   · National Suicide Prevention Lifeline 888-560-CWXX (6467)  · National Hope Line Network 800-SUICIDE (216-5639)    I acknowledge receipt and understanding of these Home Care Instructions.

## 2018-04-05 ENCOUNTER — APPOINTMENT (OUTPATIENT)
Dept: RADIOLOGY | Facility: MEDICAL CENTER | Age: 63
End: 2018-04-05
Attending: EMERGENCY MEDICINE
Payer: MEDICARE

## 2018-04-05 ENCOUNTER — HOSPITAL ENCOUNTER (OUTPATIENT)
Facility: MEDICAL CENTER | Age: 63
End: 2018-04-06
Attending: EMERGENCY MEDICINE | Admitting: INTERNAL MEDICINE
Payer: MEDICARE

## 2018-04-05 ENCOUNTER — RESOLUTE PROFESSIONAL BILLING HOSPITAL PROF FEE (OUTPATIENT)
Dept: HOSPITALIST | Facility: MEDICAL CENTER | Age: 63
End: 2018-04-05
Payer: MEDICARE

## 2018-04-05 DIAGNOSIS — C25.0 MALIGNANT NEOPLASM OF HEAD OF PANCREAS (HCC): ICD-10-CM

## 2018-04-05 DIAGNOSIS — R07.81 PLEURITIC CHEST PAIN: ICD-10-CM

## 2018-04-05 PROBLEM — E11.9 TYPE II DIABETES MELLITUS (HCC): Status: ACTIVE | Noted: 2018-04-05

## 2018-04-05 PROBLEM — R07.9 CHEST PAIN: Status: ACTIVE | Noted: 2018-04-05

## 2018-04-05 PROBLEM — E87.6 HYPOKALEMIA: Status: ACTIVE | Noted: 2018-04-05

## 2018-04-05 LAB
ALBUMIN SERPL BCP-MCNC: 3.5 G/DL (ref 3.2–4.9)
ALBUMIN/GLOB SERPL: 0.9 G/DL
ALP SERPL-CCNC: 84 U/L (ref 30–99)
ALT SERPL-CCNC: 20 U/L (ref 2–50)
ANION GAP SERPL CALC-SCNC: 8 MMOL/L (ref 0–11.9)
APTT PPP: 29 SEC (ref 24.7–36)
AST SERPL-CCNC: 22 U/L (ref 12–45)
BASOPHILS # BLD AUTO: 0.6 % (ref 0–1.8)
BASOPHILS # BLD: 0.06 K/UL (ref 0–0.12)
BILIRUB SERPL-MCNC: 0.3 MG/DL (ref 0.1–1.5)
BNP SERPL-MCNC: 36 PG/ML (ref 0–100)
BUN SERPL-MCNC: 16 MG/DL (ref 8–22)
CALCIUM SERPL-MCNC: 8.9 MG/DL (ref 8.4–10.2)
CHLORIDE SERPL-SCNC: 105 MMOL/L (ref 96–112)
CO2 SERPL-SCNC: 24 MMOL/L (ref 20–33)
CREAT SERPL-MCNC: 0.53 MG/DL (ref 0.5–1.4)
EKG IMPRESSION: NORMAL
EOSINOPHIL # BLD AUTO: 0.23 K/UL (ref 0–0.51)
EOSINOPHIL NFR BLD: 2.4 % (ref 0–6.9)
ERYTHROCYTE [DISTWIDTH] IN BLOOD BY AUTOMATED COUNT: 43.7 FL (ref 35.9–50)
GLOBULIN SER CALC-MCNC: 3.9 G/DL (ref 1.9–3.5)
GLUCOSE BLD-MCNC: 308 MG/DL (ref 65–99)
GLUCOSE SERPL-MCNC: 224 MG/DL (ref 65–99)
HCT VFR BLD AUTO: 39.2 % (ref 37–47)
HGB BLD-MCNC: 13.2 G/DL (ref 12–16)
IMM GRANULOCYTES # BLD AUTO: 0.04 K/UL (ref 0–0.11)
IMM GRANULOCYTES NFR BLD AUTO: 0.4 % (ref 0–0.9)
INR PPP: 1.06 (ref 0.87–1.13)
LIPASE SERPL-CCNC: 11 U/L (ref 7–58)
LYMPHOCYTES # BLD AUTO: 1.97 K/UL (ref 1–4.8)
LYMPHOCYTES NFR BLD: 20.3 % (ref 22–41)
MCH RBC QN AUTO: 28.6 PG (ref 27–33)
MCHC RBC AUTO-ENTMCNC: 33.7 G/DL (ref 33.6–35)
MCV RBC AUTO: 85 FL (ref 81.4–97.8)
MONOCYTES # BLD AUTO: 0.55 K/UL (ref 0–0.85)
MONOCYTES NFR BLD AUTO: 5.7 % (ref 0–13.4)
NEUTROPHILS # BLD AUTO: 6.85 K/UL (ref 2–7.15)
NEUTROPHILS NFR BLD: 70.6 % (ref 44–72)
NRBC # BLD AUTO: 0 K/UL
NRBC BLD-RTO: 0 /100 WBC
PLATELET # BLD AUTO: 341 K/UL (ref 164–446)
PMV BLD AUTO: 9.6 FL (ref 9–12.9)
POTASSIUM SERPL-SCNC: 3.4 MMOL/L (ref 3.6–5.5)
PROT SERPL-MCNC: 7.4 G/DL (ref 6–8.2)
PROTHROMBIN TIME: 13.7 SEC (ref 12–14.6)
RBC # BLD AUTO: 4.61 M/UL (ref 4.2–5.4)
SODIUM SERPL-SCNC: 137 MMOL/L (ref 135–145)
TROPONIN I SERPL-MCNC: <0.02 NG/ML (ref 0–0.04)
WBC # BLD AUTO: 9.7 K/UL (ref 4.8–10.8)

## 2018-04-05 PROCEDURE — 94760 N-INVAS EAR/PLS OXIMETRY 1: CPT

## 2018-04-05 PROCEDURE — 700101 HCHG RX REV CODE 250: Performed by: INTERNAL MEDICINE

## 2018-04-05 PROCEDURE — 700111 HCHG RX REV CODE 636 W/ 250 OVERRIDE (IP): Performed by: EMERGENCY MEDICINE

## 2018-04-05 PROCEDURE — 96374 THER/PROPH/DIAG INJ IV PUSH: CPT | Mod: XU

## 2018-04-05 PROCEDURE — 82962 GLUCOSE BLOOD TEST: CPT | Mod: 91

## 2018-04-05 PROCEDURE — 74170 CT ABD WO CNTRST FLWD CNTRST: CPT

## 2018-04-05 PROCEDURE — 85025 COMPLETE CBC W/AUTO DIFF WBC: CPT

## 2018-04-05 PROCEDURE — 71045 X-RAY EXAM CHEST 1 VIEW: CPT

## 2018-04-05 PROCEDURE — 83880 ASSAY OF NATRIURETIC PEPTIDE: CPT

## 2018-04-05 PROCEDURE — 83690 ASSAY OF LIPASE: CPT

## 2018-04-05 PROCEDURE — 700111 HCHG RX REV CODE 636 W/ 250 OVERRIDE (IP): Performed by: INTERNAL MEDICINE

## 2018-04-05 PROCEDURE — 96372 THER/PROPH/DIAG INJ SC/IM: CPT | Mod: XU

## 2018-04-05 PROCEDURE — 700117 HCHG RX CONTRAST REV CODE 255: Performed by: EMERGENCY MEDICINE

## 2018-04-05 PROCEDURE — 80053 COMPREHEN METABOLIC PANEL: CPT

## 2018-04-05 PROCEDURE — 96361 HYDRATE IV INFUSION ADD-ON: CPT

## 2018-04-05 PROCEDURE — 85730 THROMBOPLASTIN TIME PARTIAL: CPT

## 2018-04-05 PROCEDURE — 93005 ELECTROCARDIOGRAM TRACING: CPT | Performed by: EMERGENCY MEDICINE

## 2018-04-05 PROCEDURE — 99218 PR INITIAL OBSERVATION CARE,LEVL I: CPT | Performed by: INTERNAL MEDICINE

## 2018-04-05 PROCEDURE — 84484 ASSAY OF TROPONIN QUANT: CPT

## 2018-04-05 PROCEDURE — 83036 HEMOGLOBIN GLYCOSYLATED A1C: CPT

## 2018-04-05 PROCEDURE — 700105 HCHG RX REV CODE 258: Performed by: EMERGENCY MEDICINE

## 2018-04-05 PROCEDURE — G0378 HOSPITAL OBSERVATION PER HR: HCPCS

## 2018-04-05 PROCEDURE — 36415 COLL VENOUS BLD VENIPUNCTURE: CPT

## 2018-04-05 PROCEDURE — 93005 ELECTROCARDIOGRAM TRACING: CPT

## 2018-04-05 PROCEDURE — A9270 NON-COVERED ITEM OR SERVICE: HCPCS | Performed by: INTERNAL MEDICINE

## 2018-04-05 PROCEDURE — 700102 HCHG RX REV CODE 250 W/ 637 OVERRIDE(OP): Performed by: INTERNAL MEDICINE

## 2018-04-05 PROCEDURE — 71275 CT ANGIOGRAPHY CHEST: CPT

## 2018-04-05 PROCEDURE — 85610 PROTHROMBIN TIME: CPT

## 2018-04-05 PROCEDURE — 99285 EMERGENCY DEPT VISIT HI MDM: CPT

## 2018-04-05 RX ORDER — MORPHINE SULFATE 4 MG/ML
4 INJECTION, SOLUTION INTRAMUSCULAR; INTRAVENOUS ONCE
Status: COMPLETED | OUTPATIENT
Start: 2018-04-05 | End: 2018-04-05

## 2018-04-05 RX ORDER — ATORVASTATIN CALCIUM 10 MG/1
10 TABLET, FILM COATED ORAL NIGHTLY
Status: DISCONTINUED | OUTPATIENT
Start: 2018-04-05 | End: 2018-04-06 | Stop reason: HOSPADM

## 2018-04-05 RX ORDER — POLYETHYLENE GLYCOL 3350 17 G/17G
1 POWDER, FOR SOLUTION ORAL
Status: DISCONTINUED | OUTPATIENT
Start: 2018-04-05 | End: 2018-04-06 | Stop reason: HOSPADM

## 2018-04-05 RX ORDER — ACETAMINOPHEN 325 MG/1
650 TABLET ORAL EVERY 6 HOURS PRN
Status: DISCONTINUED | OUTPATIENT
Start: 2018-04-05 | End: 2018-04-06 | Stop reason: HOSPADM

## 2018-04-05 RX ORDER — PROMETHAZINE HYDROCHLORIDE 25 MG/1
12.5-25 TABLET ORAL EVERY 4 HOURS PRN
Status: DISCONTINUED | OUTPATIENT
Start: 2018-04-05 | End: 2018-04-06 | Stop reason: HOSPADM

## 2018-04-05 RX ORDER — ONDANSETRON 2 MG/ML
4 INJECTION INTRAMUSCULAR; INTRAVENOUS EVERY 4 HOURS PRN
Status: DISCONTINUED | OUTPATIENT
Start: 2018-04-05 | End: 2018-04-06 | Stop reason: HOSPADM

## 2018-04-05 RX ORDER — AMOXICILLIN 250 MG
2 CAPSULE ORAL 2 TIMES DAILY
Status: DISCONTINUED | OUTPATIENT
Start: 2018-04-05 | End: 2018-04-06 | Stop reason: HOSPADM

## 2018-04-05 RX ORDER — ONDANSETRON 4 MG/1
4 TABLET, ORALLY DISINTEGRATING ORAL EVERY 4 HOURS PRN
Status: DISCONTINUED | OUTPATIENT
Start: 2018-04-05 | End: 2018-04-06 | Stop reason: HOSPADM

## 2018-04-05 RX ORDER — SODIUM CHLORIDE AND POTASSIUM CHLORIDE 150; 900 MG/100ML; MG/100ML
INJECTION, SOLUTION INTRAVENOUS CONTINUOUS
Status: DISCONTINUED | OUTPATIENT
Start: 2018-04-05 | End: 2018-04-06 | Stop reason: HOSPADM

## 2018-04-05 RX ORDER — FLUTICASONE PROPIONATE 50 MCG
2 SPRAY, SUSPENSION (ML) NASAL 2 TIMES DAILY
Status: DISCONTINUED | OUTPATIENT
Start: 2018-04-05 | End: 2018-04-06 | Stop reason: HOSPADM

## 2018-04-05 RX ORDER — RANITIDINE 150 MG/1
150 TABLET ORAL PRN
Status: DISCONTINUED | OUTPATIENT
Start: 2018-04-05 | End: 2018-04-05

## 2018-04-05 RX ORDER — INSULIN GLARGINE 100 [IU]/ML
32 INJECTION, SOLUTION SUBCUTANEOUS 2 TIMES DAILY
Status: DISCONTINUED | OUTPATIENT
Start: 2018-04-05 | End: 2018-04-06 | Stop reason: HOSPADM

## 2018-04-05 RX ORDER — ESOMEPRAZOLE MAGNESIUM 40 MG/1
40 CAPSULE, DELAYED RELEASE ORAL EVERY EVENING
Status: DISCONTINUED | OUTPATIENT
Start: 2018-04-05 | End: 2018-04-05

## 2018-04-05 RX ORDER — DEXTROSE MONOHYDRATE 25 G/50ML
25 INJECTION, SOLUTION INTRAVENOUS
Status: DISCONTINUED | OUTPATIENT
Start: 2018-04-05 | End: 2018-04-06 | Stop reason: HOSPADM

## 2018-04-05 RX ORDER — IBUPROFEN 400 MG/1
800 TABLET ORAL EVERY 8 HOURS PRN
Status: ON HOLD | COMMUNITY
End: 2021-10-13

## 2018-04-05 RX ORDER — HEPARIN SODIUM 5000 [USP'U]/ML
5000 INJECTION, SOLUTION INTRAVENOUS; SUBCUTANEOUS EVERY 8 HOURS
Status: DISCONTINUED | OUTPATIENT
Start: 2018-04-05 | End: 2018-04-06 | Stop reason: HOSPADM

## 2018-04-05 RX ORDER — OMEPRAZOLE 20 MG/1
20 CAPSULE, DELAYED RELEASE ORAL
Status: DISCONTINUED | OUTPATIENT
Start: 2018-04-05 | End: 2018-04-06 | Stop reason: HOSPADM

## 2018-04-05 RX ORDER — PROMETHAZINE HYDROCHLORIDE 25 MG/1
12.5-25 SUPPOSITORY RECTAL EVERY 4 HOURS PRN
Status: DISCONTINUED | OUTPATIENT
Start: 2018-04-05 | End: 2018-04-06 | Stop reason: HOSPADM

## 2018-04-05 RX ORDER — FAMOTIDINE 20 MG/1
20 TABLET, FILM COATED ORAL 2 TIMES DAILY PRN
Status: DISCONTINUED | OUTPATIENT
Start: 2018-04-05 | End: 2018-04-06

## 2018-04-05 RX ORDER — SODIUM CHLORIDE 9 MG/ML
INJECTION, SOLUTION INTRAVENOUS CONTINUOUS
Status: DISCONTINUED | OUTPATIENT
Start: 2018-04-05 | End: 2018-04-06 | Stop reason: HOSPADM

## 2018-04-05 RX ORDER — BISACODYL 10 MG
10 SUPPOSITORY, RECTAL RECTAL
Status: DISCONTINUED | OUTPATIENT
Start: 2018-04-05 | End: 2018-04-06 | Stop reason: HOSPADM

## 2018-04-05 RX ADMIN — MORPHINE SULFATE 4 MG: 4 INJECTION INTRAVENOUS at 18:32

## 2018-04-05 RX ADMIN — HEPARIN SODIUM 5000 UNITS: 5000 INJECTION, SOLUTION INTRAVENOUS; SUBCUTANEOUS at 21:44

## 2018-04-05 RX ADMIN — PANCRELIPASE 48000 UNITS: 24000; 76000; 120000 CAPSULE, DELAYED RELEASE PELLETS ORAL at 19:59

## 2018-04-05 RX ADMIN — IOHEXOL 100 ML: 350 INJECTION, SOLUTION INTRAVENOUS at 17:30

## 2018-04-05 RX ADMIN — ACETAMINOPHEN 650 MG: 325 TABLET, FILM COATED ORAL at 22:27

## 2018-04-05 RX ADMIN — OMEPRAZOLE 20 MG: 20 CAPSULE, DELAYED RELEASE ORAL at 21:43

## 2018-04-05 RX ADMIN — INSULIN HUMAN 10 UNITS: 100 INJECTION, SOLUTION PARENTERAL at 21:53

## 2018-04-05 RX ADMIN — SODIUM CHLORIDE: 9 INJECTION, SOLUTION INTRAVENOUS at 16:26

## 2018-04-05 RX ADMIN — ATORVASTATIN CALCIUM 10 MG: 10 TABLET, FILM COATED ORAL at 21:49

## 2018-04-05 RX ADMIN — POTASSIUM CHLORIDE AND SODIUM CHLORIDE: 900; 150 INJECTION, SOLUTION INTRAVENOUS at 20:00

## 2018-04-05 ASSESSMENT — ENCOUNTER SYMPTOMS
SORE THROAT: 0
FEVER: 0
BACK PAIN: 0
DIZZINESS: 0
BLOOD IN STOOL: 0
FOCAL WEAKNESS: 0
DEPRESSION: 0
WEAKNESS: 1
VOMITING: 0
COUGH: 0
CHILLS: 0
NAUSEA: 0
MYALGIAS: 0
SHORTNESS OF BREATH: 0
HALLUCINATIONS: 0
HEARTBURN: 0
PALPITATIONS: 0
HEADACHES: 0
ABDOMINAL PAIN: 0
DIARRHEA: 0

## 2018-04-05 ASSESSMENT — PATIENT HEALTH QUESTIONNAIRE - PHQ9
1. LITTLE INTEREST OR PLEASURE IN DOING THINGS: NOT AT ALL
SUM OF ALL RESPONSES TO PHQ9 QUESTIONS 1 AND 2: 0
2. FEELING DOWN, DEPRESSED, IRRITABLE, OR HOPELESS: NOT AT ALL

## 2018-04-05 ASSESSMENT — LIFESTYLE VARIABLES
ALCOHOL_USE: NO
EVER_SMOKED: NEVER
EVER_SMOKED: NEVER

## 2018-04-05 ASSESSMENT — PAIN SCALES - GENERAL
PAINLEVEL_OUTOF10: 3
PAINLEVEL_OUTOF10: 6

## 2018-04-05 NOTE — ED NOTES
Chief Complaint   Patient presents with   • Chest Pain     left side that radiates to her left breast with some SOB onset yesterday, unable to take a deep breath without pain. This pt is followed-up by her PCP who recommended she come here.

## 2018-04-05 NOTE — ED PROVIDER NOTES
ED Provider Note    CHIEF COMPLAINT  Chief Complaint   Patient presents with   • Chest Pain     left side that radiates to her left breast with some SOB onset yesterday, unable to take a deep breath without pain. This pt is followed-up by her PCP who recommended she come here.       HPI  Denise Gaines is a 62 y.o. female who presents for evaluation of chest pain.  The patient states she has pain over the left lateral chest area which radiates toward the left upper chest and substernal chest area.  The patient has a history of pancreatic cancer and underwent a Whipple procedure several years ago.  Recently on 3/15/18 the patient underwent endoscopically guided ultrasound biopsy of the lesions on the pancreas.  Patient's been having pain since that time but this worsened and she was directed to the ED.  The patient denies: Fever, chills, cough, sputum, diaphoresis, pain in the neck/jaw/arms, syncope, hematemesis, melena, hematochezia, pain or swelling lower extremities.  No other acute symptomatology or complaints.    REVIEW OF SYSTEMS  See HPI for further details. All other systems negative.    PAST MEDICAL HISTORY  Past Medical History:   Diagnosis Date   • Acid reflux    • Anesthesia     PONV   • Arthritis 4/7/17    Bilateral shoulders and right hand   • ASTHMA     allergy induced-uses inhalers prn   • Blood clotting disorder (CMS-HCC) dx 10/2016    Right eye retina-Treated with laser and injections   • Bowel habit changes     Diarrhea chronic   • Bronchitis 12/2017   • Cancer (CMS-HCC) 12/2013-dx    pancreatic CA. Whipple procedure 1/2014   • Carotid artery plaque 2016    per ultrasound   • Cataract     Bilateral-mild   • Colitis since age 14   • Diabetes (CMS-HCC) 2014    on Lantus S/P whipple   • Heart burn     treated with nexium and prn zantac   • Heart murmur     since birth   • High cholesterol    • Indigestion    • MRSA (methicillin resistant Staphylococcus aureus) 11/2016    Left hand-cleared    •  MRSA (methicillin resistant Staphylococcus aureus) 1/30/2017    Back of left thigh-cleared.   • Pain 01/2018    lower left abdomen   • Pneumonia 2012   • Psychiatric problem     depression/ anxiety   • Shoulder pain 4/7/17    Chronic to right shoulder   • Snoring    • Unspecified urinary incontinence     wears pad   • Urinary bladder disorder     incontinence       FAMILY HISTORY  No family history on file.    SOCIAL HISTORY  Nonsmoker; no alcohol use;    SURGICAL HISTORY  Past Surgical History:   Procedure Laterality Date   • GASTROSCOPY  3/15/2018    Procedure: GASTROSCOPY - POSS BIOPSY, DILATION, POLYPECTOMY, CONTROL OF HEMORRHAGE;  Surgeon: Edward Rubin M.D.;  Location: [facility];  Service: EUS   • EGD W/ENDOSCOPIC ULTRASOUND  3/15/2018    Procedure: EGD W/ENDOSCOPIC ULTRASOUND;  Surgeon: Edward Rubin M.D.;  Location: [facility];  Service: EUS   • EGD WITH ASP/BX  3/15/2018    Procedure: EGD WITH ASP/BX;  Surgeon: Edward Rubin M.D.;  Location: [facility];  Service: EUS   • GASTROSCOPY N/A 4/13/2017    Procedure: GASTROSCOPY - W/POSS BIOPSY, DILATION, POLYPECTOMY, CONTROL OF HEMORRHAGE;  Surgeon: Edward Rubin M.D.;  Location: [facility];  Service:    • EGD W/ENDOSCOPIC ULTRASOUND N/A 4/13/2017    Procedure: EGD W/ENDOSCOPIC ULTRASOUND;  Surgeon: Edward Rubin M.D.;  Location: [facility];  Service:    • COLONOSCOPY  2016    x 2   • ERCP-DIAGNOSTIC W/BIOPSY  8/2015    several to verify no return of pancreatic CA    • HERNIA REPAIR  7/2014    5 umbilical repaired at same time   • LAPAROSCOPY  1/27/2014    Performed by Shahbaz Jang M.D. at SURGERY [facility]   • WHIPPLE PROCEDURE  1/27/2014    Performed by Shahbaz Jang M.D. at SURGERY [facility]   • NODE DISSECTION  1/27/2014    Performed by Shahbaz Jang M.D. at [facility]   • TONSILLECTOMY  2011   • OTHER ORTHOPEDIC SURGERY  "Left 2009    Nerve damage S/P lumbar surgery-foot broken and pinned.    • LUMBAR LAMINECTOMY DISKECTOMY  2008    L5   • SHOULDER ARTHROSCOPY W/ ROTATOR CUFF REPAIR Right 2006   • CARPAL TUNNEL RELEASE Right 2005   • BLADDER SUSPENSION  2003   • HYSTERECTOMY, TOTAL ABDOMINAL  1990       CURRENT MEDICATIONS  See nurse's notes    ALLERGIES  Allergies   Allergen Reactions   • Penicillins Anaphylaxis   • Bee Anaphylaxis   • Clindamycin Rash     Head to toe rash like chicken pox for 4 months.   • Codeine Vomiting   • Percocet [Oxycodone-Acetaminophen] Vomiting   • Sulfa Drugs Hives and Itching   • Vicodin [Hydrocodone-Acetaminophen] Vomiting       PHYSICAL EXAM  VITAL SIGNS: /92   Pulse 83   Temp 36.1 °C (97 °F)   Resp 16   Ht 1.575 m (5' 2\")   Wt 70.6 kg (155 lb 10.3 oz)   SpO2 94%   BMI 28.47 kg/m²    Constitutional: 62-year-old female, appears mildly weak, oriented ×3   HENT: ,Atraumatic, Bilateral external ears normal, tympanic membranes clear, Oropharynx mildly dry, No oral exudates, Nose normal.   Eyes: PERRL, EOMI, Conjunctiva normal, No discharge.   Neck: Normal range of motion, No tenderness, Supple, No stridor.   Lymphatic: No lymphadenopathy noted.   Cardiovascular: Normal heart rate, Normal rhythm, No murmurs, No rubs, No gallops.   Thorax & Lungs: Normal Equal breath sounds, No respiratory distress, No wheezing, no stridor, no rales. No chest tenderness.   Abdomen: Soft, nontender, nondistended, no organomegaly, positive bowel sounds normal in quality. No guarding or rebound.  Skin: Good skin turgor, pink, warm, dry. No rashes, petechiae, purpura. Normal capillary refill.   Back: No tenderness, No CVA tenderness.   Extremities: Intact distal pulses, No edema, No tenderness, No cyanosis, No clubbing. Vascular: Pulses are 2+, symmetric in the upper and lower extremities.  Negative Homans sign;  Musculoskeletal: Mild diffuse arthritic changes. No tenderness to palpation or major deformities noted. "   Neurologic: Alert & oriented x 3, Normal motor function, Normal sensory function, No gross focal deficits noted.   Psychiatric: Affect normal, Judgment normal, Mood normal.       EKG  I have interpreted: Rate 80, rhythm sinus, axis normal, CT QRS Q-T intervals normal, no acute ischemic or injury changes, 12-lead EKG, no acute change compared to a tracing of 3/8/18;    RADIOLOGY/PROCEDURES  CT-CTA CHEST PULMONARY ARTERY W/ RECONS   Final Result         1. No CT evidence of pulmonary embolism.      2. Mild diffuse hazy groundglass opacity throughout both lungs could relate to expiratory change. There are areas of air trapping, suggesting of small airway disease.      3. Granulomatous disease with calcified right inferior hilar lymph nodes.      DX-CHEST-LIMITED (1 VIEW)   Final Result         No acute cardiopulmonary abnormalities are identified.      CT-ABDOMEN WITH & W/O    (Results Pending)   NM-CARDIAC STRESS TEST    (Results Pending)         COURSE & MEDICAL DECISION MAKING  Pertinent Labs & Imaging studies reviewed. (See chart for details)  1.  Saline lock    Laboratory studies: CBC and differential within normal; coags within normal limits; CMP shows potassium of 3.4, random glucose 224, otherwise within normal; lipase 11; troponin less than 0.02;    Discussion/consultation: At this time, patient presents for evaluation of chest pain.  There is no evidence pulmonary embolism.  Initial troponin and EKG are also normal.  At this time, I spoke with the hospitalist on call.  The patient will be admitted for further monitoring, treatment, and care.    FINAL IMPRESSION  1. Pleuritic chest pain    2. Malignant neoplasm of head of pancreas (CMS-HCC)       PLAN  1.  The patient will be admitted for further monitoring, treatment, and care.    Electronically signed by: Guy G Gansert, 4/5/2018 3:56 PM

## 2018-04-06 ENCOUNTER — APPOINTMENT (OUTPATIENT)
Dept: RADIOLOGY | Facility: MEDICAL CENTER | Age: 63
End: 2018-04-06
Attending: INTERNAL MEDICINE
Payer: MEDICARE

## 2018-04-06 VITALS
DIASTOLIC BLOOD PRESSURE: 78 MMHG | RESPIRATION RATE: 18 BRPM | SYSTOLIC BLOOD PRESSURE: 137 MMHG | WEIGHT: 155.65 LBS | BODY MASS INDEX: 28.64 KG/M2 | TEMPERATURE: 98.2 F | HEART RATE: 83 BPM | HEIGHT: 62 IN | OXYGEN SATURATION: 95 %

## 2018-04-06 PROBLEM — R07.9 CHEST PAIN: Status: RESOLVED | Noted: 2018-04-05 | Resolved: 2018-04-06

## 2018-04-06 PROBLEM — E87.6 HYPOKALEMIA: Status: RESOLVED | Noted: 2018-04-05 | Resolved: 2018-04-06

## 2018-04-06 LAB
ALBUMIN SERPL BCP-MCNC: 2.5 G/DL (ref 3.2–4.9)
ALBUMIN/GLOB SERPL: 0.7 G/DL
ALP SERPL-CCNC: 71 U/L (ref 30–99)
ALT SERPL-CCNC: 17 U/L (ref 2–50)
ANION GAP SERPL CALC-SCNC: 8 MMOL/L (ref 0–11.9)
AST SERPL-CCNC: 23 U/L (ref 12–45)
BASOPHILS # BLD AUTO: 0.3 % (ref 0–1.8)
BASOPHILS # BLD: 0.03 K/UL (ref 0–0.12)
BILIRUB SERPL-MCNC: 0.5 MG/DL (ref 0.1–1.5)
BUN SERPL-MCNC: 9 MG/DL (ref 8–22)
CALCIUM SERPL-MCNC: 7.9 MG/DL (ref 8.4–10.2)
CHLORIDE SERPL-SCNC: 105 MMOL/L (ref 96–112)
CHOLEST SERPL-MCNC: 105 MG/DL (ref 100–199)
CO2 SERPL-SCNC: 23 MMOL/L (ref 20–33)
CREAT SERPL-MCNC: 0.55 MG/DL (ref 0.5–1.4)
EOSINOPHIL # BLD AUTO: 0.1 K/UL (ref 0–0.51)
EOSINOPHIL NFR BLD: 1 % (ref 0–6.9)
ERYTHROCYTE [DISTWIDTH] IN BLOOD BY AUTOMATED COUNT: 44 FL (ref 35.9–50)
EST. AVERAGE GLUCOSE BLD GHB EST-MCNC: 237 MG/DL
GLOBULIN SER CALC-MCNC: 3.4 G/DL (ref 1.9–3.5)
GLUCOSE BLD-MCNC: 127 MG/DL (ref 65–99)
GLUCOSE BLD-MCNC: 210 MG/DL (ref 65–99)
GLUCOSE BLD-MCNC: 239 MG/DL (ref 65–99)
GLUCOSE BLD-MCNC: 60 MG/DL (ref 65–99)
GLUCOSE BLD-MCNC: 86 MG/DL (ref 65–99)
GLUCOSE SERPL-MCNC: 277 MG/DL (ref 65–99)
HBA1C MFR BLD: 9.9 % (ref 0–5.6)
HCT VFR BLD AUTO: 35.2 % (ref 37–47)
HDLC SERPL-MCNC: 39 MG/DL
HGB BLD-MCNC: 11.6 G/DL (ref 12–16)
IMM GRANULOCYTES # BLD AUTO: 0.05 K/UL (ref 0–0.11)
IMM GRANULOCYTES NFR BLD AUTO: 0.5 % (ref 0–0.9)
LDLC SERPL CALC-MCNC: 55 MG/DL
LYMPHOCYTES # BLD AUTO: 0.84 K/UL (ref 1–4.8)
LYMPHOCYTES NFR BLD: 8.1 % (ref 22–41)
MCH RBC QN AUTO: 28.4 PG (ref 27–33)
MCHC RBC AUTO-ENTMCNC: 33 G/DL (ref 33.6–35)
MCV RBC AUTO: 86.1 FL (ref 81.4–97.8)
MONOCYTES # BLD AUTO: 0.78 K/UL (ref 0–0.85)
MONOCYTES NFR BLD AUTO: 7.5 % (ref 0–13.4)
NEUTROPHILS # BLD AUTO: 8.57 K/UL (ref 2–7.15)
NEUTROPHILS NFR BLD: 82.6 % (ref 44–72)
NRBC # BLD AUTO: 0 K/UL
NRBC BLD-RTO: 0 /100 WBC
PLATELET # BLD AUTO: 275 K/UL (ref 164–446)
PMV BLD AUTO: 9.7 FL (ref 9–12.9)
POTASSIUM SERPL-SCNC: 3.6 MMOL/L (ref 3.6–5.5)
PROT SERPL-MCNC: 5.9 G/DL (ref 6–8.2)
RBC # BLD AUTO: 4.09 M/UL (ref 4.2–5.4)
SODIUM SERPL-SCNC: 136 MMOL/L (ref 135–145)
TRIGL SERPL-MCNC: 55 MG/DL (ref 0–149)
TROPONIN I SERPL-MCNC: <0.02 NG/ML (ref 0–0.04)
WBC # BLD AUTO: 10.4 K/UL (ref 4.8–10.8)

## 2018-04-06 PROCEDURE — 99217 PR OBSERVATION CARE DISCHARGE: CPT | Performed by: HOSPITALIST

## 2018-04-06 PROCEDURE — 700111 HCHG RX REV CODE 636 W/ 250 OVERRIDE (IP): Performed by: INTERNAL MEDICINE

## 2018-04-06 PROCEDURE — 82962 GLUCOSE BLOOD TEST: CPT | Mod: 91

## 2018-04-06 PROCEDURE — 36415 COLL VENOUS BLD VENIPUNCTURE: CPT

## 2018-04-06 PROCEDURE — 700102 HCHG RX REV CODE 250 W/ 637 OVERRIDE(OP): Performed by: INTERNAL MEDICINE

## 2018-04-06 PROCEDURE — 80053 COMPREHEN METABOLIC PANEL: CPT

## 2018-04-06 PROCEDURE — 84484 ASSAY OF TROPONIN QUANT: CPT

## 2018-04-06 PROCEDURE — A9502 TC99M TETROFOSMIN: HCPCS

## 2018-04-06 PROCEDURE — 700111 HCHG RX REV CODE 636 W/ 250 OVERRIDE (IP)

## 2018-04-06 PROCEDURE — 85025 COMPLETE CBC W/AUTO DIFF WBC: CPT

## 2018-04-06 PROCEDURE — 700111 HCHG RX REV CODE 636 W/ 250 OVERRIDE (IP): Performed by: HOSPITALIST

## 2018-04-06 PROCEDURE — 80061 LIPID PANEL: CPT

## 2018-04-06 PROCEDURE — 96372 THER/PROPH/DIAG INJ SC/IM: CPT | Mod: XU

## 2018-04-06 PROCEDURE — G0378 HOSPITAL OBSERVATION PER HR: HCPCS

## 2018-04-06 PROCEDURE — 96375 TX/PRO/DX INJ NEW DRUG ADDON: CPT | Mod: XU

## 2018-04-06 PROCEDURE — A9270 NON-COVERED ITEM OR SERVICE: HCPCS | Performed by: INTERNAL MEDICINE

## 2018-04-06 RX ORDER — REGADENOSON 0.08 MG/ML
INJECTION, SOLUTION INTRAVENOUS
Status: COMPLETED
Start: 2018-04-06 | End: 2018-04-06

## 2018-04-06 RX ORDER — KETOROLAC TROMETHAMINE 30 MG/ML
15 INJECTION, SOLUTION INTRAMUSCULAR; INTRAVENOUS EVERY 6 HOURS PRN
Status: DISCONTINUED | OUTPATIENT
Start: 2018-04-06 | End: 2018-04-06 | Stop reason: HOSPADM

## 2018-04-06 RX ADMIN — HEPARIN SODIUM 5000 UNITS: 5000 INJECTION, SOLUTION INTRAVENOUS; SUBCUTANEOUS at 06:29

## 2018-04-06 RX ADMIN — KETOROLAC TROMETHAMINE 15 MG: 30 INJECTION, SOLUTION INTRAMUSCULAR; INTRAVENOUS at 12:37

## 2018-04-06 RX ADMIN — PANCRELIPASE 48000 UNITS: 24000; 76000; 120000 CAPSULE, DELAYED RELEASE PELLETS ORAL at 12:36

## 2018-04-06 RX ADMIN — INSULIN HUMAN 4 UNITS: 100 INJECTION, SOLUTION PARENTERAL at 12:33

## 2018-04-06 RX ADMIN — Medication 16 G: at 02:49

## 2018-04-06 RX ADMIN — SERTRALINE HYDROCHLORIDE 150 MG: 50 TABLET ORAL at 12:36

## 2018-04-06 RX ADMIN — INSULIN GLARGINE 32 UNITS: 100 INJECTION, SOLUTION SUBCUTANEOUS at 12:31

## 2018-04-06 RX ADMIN — REGADENOSON 0.4 MG: 0.08 INJECTION, SOLUTION INTRAVENOUS at 10:56

## 2018-04-06 ASSESSMENT — PAIN SCALES - GENERAL: PAINLEVEL_OUTOF10: 4

## 2018-04-06 NOTE — DIETARY
"Nutrition services: Day 1 of admit.  Denise Gaines is a 62 y.o. female with admitting DX of Chest pain  Nutrition Admit Trigger for weight loss prior to admit.  Pertinent History:  Pancreatic cancer    Assessment:  Height: 157.5 cm (5' 2\")  Weight: 70.6 kg (155 lb 10.3 oz)  Body mass index is 28.47 kg/m².  Diet Rx: Diabetic    Evaluation:   1. Patient was OOR having a stress test.  2. NPO this am for stress test.  3. Per previous admit records, patient weighed 69.2 kg on 3/15/18 and 73.2 kg on 4/13/17.  Current weight is 70.6 kg.  4. Pertinent Labs:  Glucose 277  5. Pertinent Medications/fluids:  NaCl w/KCl at 83 ml/hr, Lipitor, Pepcid, Lantus, SSI, Prilosec, pancrelipase  6. Skin:  No skin breakdown noted.    Recommendations/Plan:  1. F/U for weight loss when appropriate  2. Monitor PO intake.  3. Nutrition Services to work with patient for food preferences.      "

## 2018-04-06 NOTE — PROGRESS NOTES
MD in to see pt; d/c orders received. IV dc'd. Pt changed into clothing with assistance. Discharge instructions given; pt and family verbalized understanding and questions answered. Pt dc'd in w/c with hospital escort at 1630.

## 2018-04-06 NOTE — PROGRESS NOTES
Pt with fsbg of 60.  Gave pt 4 glucose tabs, orange juicex2, apple juicex1, and aftab crackersx2.  Rechecked fsbg 15 mins later and blood glucose at 127.    Notified Dr. Hamilton on pt's condition.  No new orders received from Dr. Hamilton.

## 2018-04-06 NOTE — ED NOTES
Med rec updated and complete   Allergies reviewed  Pt reports that she is getting samples of NOVOLOG at her doctors office.  Pt reports no antibiotics in the last 30 days.  Pt reports that she is taking IBUPROFEN 200MG 4 tablets every 8 hours, not PRN.

## 2018-04-06 NOTE — DISCHARGE INSTRUCTIONS
Chest Pain, Nonspecific  It is often hard to give a specific diagnosis for the cause of chest pain. There is always a chance that your pain could be related to something serious, like a heart attack or a blood clot in the lungs. You need to follow up with your caregiver for further evaluation. More lab tests or other studies such as X-rays, electrocardiography, stress testing, or cardiac imaging may be needed to find the cause of your pain.  Most of the time, nonspecific chest pain improves within 2 to 3 days with rest and mild pain medicine. For the next few days, avoid physical exertion or activities that bring on pain. Do not smoke. Avoid drinking alcohol. Call your caregiver for routine follow-up as advised.   SEEK IMMEDIATE MEDICAL CARE IF:  · You develop increased chest pain or pain that radiates to the arm, neck, jaw, back, or abdomen.   · You develop shortness of breath, increased coughing, or you start coughing up blood.   · You have severe back or abdominal pain, nausea, or vomiting.   · You develop severe weakness, fainting, fever, or chills.   Document Released: 12/18/2006 Document Revised: 03/11/2013 Document Reviewed: 06/06/2008  [employer]® Patient Information ©2013 [employer].    Discharge Instructions    Discharged to home by car with relative. Discharged via wheelchair, hospital escort: Yes.  Special equipment needed: Not Applicable    Be sure to schedule a follow-up appointment with your primary care doctor or any specialists as instructed.     Discharge Plan:   Influenza Vaccine Indication: Not indicated: Previously immunized this influenza season and > 8 years of age    I understand that a diet low in cholesterol, fat, and sodium is recommended for good health. Unless I have been given specific instructions below for another diet, I accept this instruction as my diet prescription.   Other diet: n/a     Special Instructions: no    · Is patient discharged on Warfarin / Coumadin?   No      Depression / Suicide Risk    As you are discharged from this [facility] facility, it is important to learn how to keep safe from harming yourself.    Recognize the warning signs:  · Abrupt changes in personality, positive or negative- including increase in energy   · Giving away possessions  · Change in eating patterns- significant weight changes-  positive or negative  · Change in sleeping patterns- unable to sleep or sleeping all the time   · Unwillingness or inability to communicate  · Depression  · Unusual sadness, discouragement and loneliness  · Talk of wanting to die  · Neglect of personal appearance   · Rebelliousness- reckless behavior  · Withdrawal from people/activities they love  · Confusion- inability to concentrate     If you or a loved one observes any of these behaviors or has concerns about self-harm, here's what you can do:  · Talk about it- your feelings and reasons for harming yourself  · Remove any means that you might use to hurt yourself (examples: pills, rope, extension cords, firearm)  · Get professional help from the community (Mental Health, Substance Abuse, psychological counseling)  · Do not be alone:Call your Safe Contact- someone whom you trust who will be there for you.  · Call your local CRISIS HOTLINE 844-3299 or 677-556-5218  · Call your local Children's Mobile Crisis Response Team [place] (885) 048-3242 or www.1DayLater  · Call the toll free National Suicide Prevention Hotlines   · National Suicide Prevention Lifeline 318-217-KWYB (5135)  · National Hope Line Network 800-SUICIDE (161-5463)

## 2018-04-06 NOTE — H&P
Hospital Medicine History and Physical    Date of Service  4/5/2018    Chief Complaint  Chief Complaint   Patient presents with   • Chest Pain     left side that radiates to her left breast with some SOB onset yesterday, unable to take a deep breath without pain. This pt is followed-up by her PCP who recommended she come here.       History of Presenting Illness  62 y.o. female with a history of pancreatic cancer status post Whipple who presented 4/5/2018 with chest pain.     She has had abdominal pain for months, was diagnosed with pancreatic cancer, is status post Whipple procedure. Yesterday, while at rest she developed centrally located chest discomfort. It did not change with exertion and was not associated with shortness of breath or nausea. It radiated slightly to her left axilla and has been relatively constant. She is not a palpitations, dizziness or lightheadedness. She did speak with the nurse at her GI physician's office regarding this pain today and he told her to come to the ER for evaluation. She's never had a heart attack in the past before. She does have occasional acid reflux symptoms. Denies any recent fever, chills or cough. Has had no leg swelling.     ER course: Troponin negative, x-ray no evidence of pneumonia. EKG no evidence of ischemia. She'll be admitted to rule out acute coronary syndrome.  CTA was negative for any evidence of PE.     Primary Care Physician  Brigitte Salmon D.O.    Consultants  None    Code Status  Full    Review of Systems  Review of Systems   Constitutional: Positive for malaise/fatigue. Negative for chills and fever.   HENT: Negative for sore throat.    Respiratory: Negative for cough and shortness of breath.    Cardiovascular: Positive for chest pain. Negative for palpitations.   Gastrointestinal: Negative for abdominal pain, blood in stool, diarrhea, heartburn, nausea and vomiting.   Genitourinary: Negative for dysuria and frequency.   Musculoskeletal: Negative  for back pain and myalgias.   Neurological: Positive for weakness. Negative for dizziness, focal weakness and headaches.   Psychiatric/Behavioral: Negative for depression and hallucinations.   All other systems reviewed and are negative.       Past Medical History  Past Medical History:   Diagnosis Date   • Pain 01/2018    lower left abdomen   • Bronchitis 12/2017   • Arthritis 4/7/17    Bilateral shoulders and right hand   • Shoulder pain 4/7/17    Chronic to right shoulder   • MRSA (methicillin resistant Staphylococcus aureus) 1/30/2017    Back of left thigh-cleared.   • MRSA (methicillin resistant Staphylococcus aureus) 11/2016    Left hand-cleared    • Carotid artery plaque 2016    per ultrasound   • Diabetes (CMS-HCC) 2014    on Lantus S/P whipple   • Pneumonia 2012   • Acid reflux    • Anesthesia     PONV   • ASTHMA     allergy induced-uses inhalers prn   • Blood clotting disorder (CMS-HCC) dx 10/2016    Right eye retina-Treated with laser and injections   • Bowel habit changes     Diarrhea chronic   • Cancer (CMS-HCC) 12/2013-dx    pancreatic CA. Whipple procedure 1/2014   • Cataract     Bilateral-mild   • Colitis since age 14   • Heart burn     treated with nexium and prn zantac   • Heart murmur     since birth   • High cholesterol    • Indigestion    • Psychiatric problem     depression/ anxiety   • Snoring    • Unspecified urinary incontinence     wears pad   • Urinary bladder disorder     incontinence       Surgical History  Past Surgical History:   Procedure Laterality Date   • GASTROSCOPY  3/15/2018    Procedure: GASTROSCOPY - POSS BIOPSY, DILATION, POLYPECTOMY, CONTROL OF HEMORRHAGE;  Surgeon: Edward Rubin M.D.;  Location: [facility];  Service: EUS   • EGD W/ENDOSCOPIC ULTRASOUND  3/15/2018    Procedure: EGD W/ENDOSCOPIC ULTRASOUND;  Surgeon: Edward Rubin M.D.;  Location: [facility];  Service: EUS   • EGD WITH ASP/BX  3/15/2018    Procedure: EGD WITH ASP/BX;  Surgeon:  Edward Rubin M.D.;  Location: [facility];  Service: EUS   • GASTROSCOPY N/A 4/13/2017    Procedure: GASTROSCOPY - W/POSS BIOPSY, DILATION, POLYPECTOMY, CONTROL OF HEMORRHAGE;  Surgeon: Edward Rubin M.D.;  Location: [facility];  Service:    • EGD W/ENDOSCOPIC ULTRASOUND N/A 4/13/2017    Procedure: EGD W/ENDOSCOPIC ULTRASOUND;  Surgeon: Edward Rubin M.D.;  Location: [facility];  Service:    • COLONOSCOPY  2016    x 2   • ERCP-DIAGNOSTIC W/BIOPSY  8/2015    several to verify no return of pancreatic CA    • HERNIA REPAIR  7/2014    5 umbilical repaired at same time   • LAPAROSCOPY  1/27/2014    Performed by Shahbaz Jang M.D. at [facility]   • WHIPPLE PROCEDURE  1/27/2014    Performed by Shahbaz Jang M.D. at [facility]   • NODE DISSECTION  1/27/2014    Performed by Shahbaz Jang M.D. at [facility]   • TONSILLECTOMY  2011   • OTHER ORTHOPEDIC SURGERY Left 2009    Nerve damage S/P lumbar surgery-foot broken and pinned.    • LUMBAR LAMINECTOMY DISKECTOMY  2008    L5   • SHOULDER ARTHROSCOPY W/ ROTATOR CUFF REPAIR Right 2006   • CARPAL TUNNEL RELEASE Right 2005   • BLADDER SUSPENSION  2003   • HYSTERECTOMY, TOTAL ABDOMINAL  1990       Medications  No current facility-administered medications on file prior to encounter.      Current Outpatient Prescriptions on File Prior to Encounter   Medication Sig Dispense Refill   • Ergocalciferol (VITAMIN D2) 400 units Tab Take 800 Units by mouth every evening.     • insulin glargine (LANTUS) 100 UNIT/ML Solution Inject 32 Units as instructed 2 times a day.     • esomeprazole (NEXIUM) 40 MG delayed-release capsule Take 40 mg by mouth every evening.     • aspirin EC (ECOTRIN) 81 MG Tablet Delayed Response Take 81 mg by mouth every day.     • Probiotic Product (PROBIOTIC ADVANCED PO) Take 1 Cap by mouth every evening.     • Multiple Vitamins-Minerals (MULTIVITAMIN  ADULT PO) Take 1 Tab by mouth every day.     • ranitidine (ZANTAC) 150 MG Tab Take 150 mg by mouth as needed for Heartburn (Or gas).     • Pseudoephedrine-Guaifenesin (MUCINEX D PO) Take 1 Tab by mouth every day.     • Pancrelipase, Lip-Prot-Amyl, (CREON) 82909 UNITS Cap DR Particles Take 2 Tabs by mouth 3 times a day, with meals.     • fluticasone (FLONASE) 50 MCG/ACT nasal spray Spray 2 Sprays in nose 2 times a day.     • atorvastatin (LIPITOR) 10 MG Tab Take 10 mg by mouth every evening.     • sertraline (ZOLOFT) 100 MG TABS Take 150 mg by mouth every morning.         Family History  No family history on file.    Social History  Social History   Substance Use Topics   • Smoking status: Never Smoker   • Smokeless tobacco: Never Used   • Alcohol use No       Allergies  Allergies   Allergen Reactions   • Bee Anaphylaxis   • Penicillins Anaphylaxis   • Clindamycin Rash     Head to toe rash like chicken pox for 4 months.   • Codeine Vomiting   • Percocet [Oxycodone-Acetaminophen] Vomiting   • Sulfa Drugs Hives and Itching   • Vicodin [Hydrocodone-Acetaminophen] Vomiting        Physical Exam  Laboratory   Hemodynamics  Temp (24hrs), Av.7 °C (98 °F), Min:36.1 °C (97 °F), Max:37.2 °C (99 °F)   Temperature: 36.7 °C (98.1 °F)  Pulse  Av.7  Min: 83  Max: 93 Heart Rate (Monitored): 80  Blood Pressure: 138/69, NIBP: 137/73      Respiratory      Respiration: 18, Pulse Oximetry: 96 %, O2 Daily Delivery Respiratory : Room Air with O2 Available             Physical Exam   Constitutional: She is oriented to person, place, and time. She appears well-developed and well-nourished. No distress.   Neck: Normal range of motion. Neck supple. No JVD present. No thyromegaly present.   Cardiovascular: Normal rate and regular rhythm.    No murmur heard.  Pulmonary/Chest: Effort normal and breath sounds normal. No respiratory distress. She has no wheezes.   Abdominal: Soft. There is tenderness.   Musculoskeletal: Normal range of  motion. She exhibits no edema or tenderness.   Neurological: She is alert and oriented to person, place, and time. No cranial nerve deficit. Coordination normal.   Skin: Skin is warm and dry. No rash noted. No erythema.   Psychiatric: She has a normal mood and affect. Her behavior is normal.       Recent Labs      04/05/18   1452   WBC  9.7   RBC  4.61   HEMOGLOBIN  13.2   HEMATOCRIT  39.2   MCV  85.0   MCH  28.6   MCHC  33.7   RDW  43.7   PLATELETCT  341   MPV  9.6     Recent Labs      04/05/18   1452   SODIUM  137   POTASSIUM  3.4*   CHLORIDE  105   CO2  24   GLUCOSE  224*   BUN  16   CREATININE  0.53   CALCIUM  8.9     Recent Labs      04/05/18   1452   ALTSGPT  20   ASTSGOT  22   ALKPHOSPHAT  84   TBILIRUBIN  0.3   LIPASE  11   GLUCOSE  224*     Recent Labs      04/05/18   1452   APTT  29.0   INR  1.06     Recent Labs      04/05/18   1452   BNPBTYPENAT  36         Lab Results   Component Value Date    TROPONINI <0.02 04/05/2018     Urinalysis:  No results found for: SPECGRAVITY, GLUCOSEUR, KETONES, NITRITE, WBCURINE, RBCURINE, BACTERIA, EPITHELCELL     Imaging  Ct-abdomen With & W/o    Result Date: 4/5/2018 4/5/2018 4:23 PM HISTORY/REASON FOR EXAM:  Pain. History of pancreatic cancer. Left-sided chest pain. Shortness of breath. History of pancreatic cancer with Whipple procedure in 2009 TECHNIQUE/EXAM DESCRIPTION:  CT scan of the abdomen without and with contrast. Initial precontrast images were obtained from the diaphragmatic domes through the iliac crests using helical technique.  Following nonionic contrast administration in an intravenous bolus fashion, and postcontrast, thin-section helical scanning obtained from the diaphragmatic domes through the iliac crests. 100 mL of Omnipaque 350 nonionic contrast was administered. Low dose optimization technique was utilized for this CT exam including automated exposure control and adjustment of the mA and/or kV according to patient size. COMPARISON: 2/27/2018.  FINDINGS: Lung bases: Please see dedicated report. Abdomen: The liver is unremarkable. Multiple splenic granulomas. The pancreas is grossly similar to recent exam. There are postsurgical changes from Whipple procedure. There is stranding of the peripancreatic fat and adjacent mesenteric root. Unchanged linear metallic density in the distal body and tail of the pancreas. Previous fluid collection along the pancreatic tail appears less conspicuous. Mild intrahepatic ductal dilatation. The gallbladder is surgically absent The adrenal glands are normal in size. The kidneys enhance symmetrically. The abdominal aorta is normal in caliber. Multiple mildly prominent retroperitoneal lymph nodes. No bowel wall thickening or bowel dilatation. Sigmoid diverticula. Pelvis: No obvious abnormality seen in the pelvic structures but evaluation limited on CT. No lymph node enlargement, free fluid, or free air in the abdomen or pelvis. No aggressive bone lesions are seen. ___________________________________     1. Previous fluid collection in the tail of the pancreas appear less conspicuous. Grossly similar fat stranding surrounding the pancreas with unchanged high density stent in the distal body/tail of the pancreas. 2. Status post Whipple procedure. Mild intrahepatic biliary ductal dilatation. 3. Multiple mildly prominent retroperitoneal lymph nodes, nonspecific could be reactive.    Dx-chest-limited (1 View)    Result Date: 4/5/2018 4/5/2018 3:51 PM HISTORY/REASON FOR EXAM:  Chest Pain Left side chest pain TECHNIQUE/EXAM DESCRIPTION AND NUMBER OF VIEWS: Single portable view of the chest. COMPARISON: CT abdomen 2/27/2018 FINDINGS: Prominent calcified right infrahilar lymph nodes, as seen on prior abdomen CT. No definite airspace opacity. No pleural effusion. No pneumothorax. Stable cardiopericardial silhouette.     No acute cardiopulmonary abnormalities are identified.    Nm-tumor Localization Wb    Result Date: 3/8/2018  3/6/2018  11:31 AM HISTORY/REASON FOR EXAM:  Pancreatic cancer with new back pain. TECHNIQUE/EXAM DESCRIPTION: 6.4 mCi of Indium 111 OctreoScan was utilized for this procedure.  3-hour, 24-hour and 48-hour whole body anterior and posterior imaging is performed.  SPECT imaging is also performed through the abdomen and pelvic regions. COMPARISON:  CT scan 2/27/2018 FINDINGS:  There is normal uptake identified within the liver and spleen.  There is normal uptake identified within the kidneys and urinary bladder.  There is activity seen within bowel. There is no abnormal uptake within the pancreatic bed corresponding  to cystic mass. SPECT images demonstrate no abnormal focus of uptake.     Negative octreotide scan.    Ct-cta Chest Pulmonary Artery W/ Recons    Result Date: 4/5/2018 4/5/2018 4:49 PM HISTORY/REASON FOR EXAM:  Pain Left-sided chest pain. TECHNIQUE/EXAM DESCRIPTION: CT angiogram scan for pulmonary embolism with contrast, with reconstructions. 1.25 mm helical sections were obtained from the lung apices through the lung bases following the rapid bolus administration of 100 mL of Omnipaque 350 nonionic contrast. Thin-section overlapping reconstruction interval was utilized. Coronal reconstructions were generated from the axial data. MIP post processing was performed and utilized for the interpretation. Low dose optimization technique was utilized for this CT exam including automated exposure control and adjustment of the mA and/or kV according to patient size. COMPARISON: None FINDINGS: Pulmonary Embolism: No. Main Pulmonary Arteries: No. Segmental branches: No. Subsegmental branches: No. Additional Comments: None. Lungs: Mild diffuse hazy groundglass opacity throughout both lungs. There are areas of air trapping. Scattered calcified granuloma. Airway: Patent. Pleura: No pleural effusion or pneumothorax. Nodes: Large calcified right inferior hilar lymph nodes. Additional findings: Thoracic aorta and great vessels:  No  aneurysm. Heart and pericardium:   No significant coronary artery calcification. No pericardial effusion. Thoracic spine:  No fracture or malalignment. No compression deformity. Chest wall:   Unremarkable. Visualized upper abdomen: Please see dedicated abdominal report.     1. No CT evidence of pulmonary embolism. 2. Mild diffuse hazy groundglass opacity throughout both lungs could relate to expiratory change. There are areas of air trapping, suggesting of small airway disease. 3. Granulomatous disease with calcified right inferior hilar lymph nodes.     Assessment/Plan     I anticipate this patient is appropriate for observation status at this time.    * Chest pain   Assessment & Plan    Occurred at rest, she has no cardiac history. It did radiate towards the left into her axilla. Her initial troponin and EKG showed no evidence of ischemia  -Admit telemetry  -Stress test in a.m.  -Check A1c and lipid panel  -Monitor troponins  -Continue aspirin and statin  -Continue Prilosec, GI cocktails and maybe GI in nature        Malignant neoplasm of head of pancreas (CMS-HCC)- (present on admission)   Assessment & Plan    She has had a Whipple procedure by Dr. Moya.  She does still have some epigastric discomfort. I suspect this is partially the etiology of her chest discomfort.  -Continue outpatient treatment regimen        Type II diabetes mellitus (CMS-[facility])   Assessment & Plan    She does follow with an endocrinologist, currently sugars are not at goal as they are greater than 220.  -Continue Lantus 32 units twice a day  -Moderate sliding scale as needed  -Check A1c  -Fluids        Hypokalemia   Assessment & Plan    Mild, replace in IV fluids, repeat in the morning        Depression- (present on admission)   Assessment & Plan    Continue Zoloft        Acid reflux disease- (present on admission)   Assessment & Plan    Continue Prilosec            VTE prophylaxis: Lovenox.

## 2018-04-06 NOTE — ASSESSMENT & PLAN NOTE
She has had a Whipple procedure by Dr. Moya.  She does still have some epigastric discomfort. I suspect this is partially the etiology of her chest discomfort.  Ct abd: noted, she follows with Dr. Rubin, will give him a call  -Continue outpatient treatment regimen

## 2018-04-06 NOTE — CARE PLAN
Problem: Nutritional:  Goal: Achieve adequate nutritional intake  Patient will consume >50% of meals  Outcome: PROGRESSING SLOWER THAN EXPECTED  NPO this am for stress test

## 2018-04-06 NOTE — ASSESSMENT & PLAN NOTE
Occurred at rest, she has no cardiac history. It did radiate towards the left into her axilla. Her initial troponin and EKG showed no evidence of ischemia  -Admit telemetry  -Stress test this morning  - lipids normal  - troponins neg thus far x 2  -Continue aspirin and statin  -Continue Prilosec, GI cocktails and maybe GI in nature

## 2018-04-06 NOTE — ASSESSMENT & PLAN NOTE
She does follow with an endocrinologist, currently sugars are not at goal as they are greater than 220.  -Continue Lantus 32 units twice a day  -Moderate sliding scale as needed  -Check A1c  -Fluids

## 2018-04-06 NOTE — PROGRESS NOTES
Tele Summary @ 2768    Rhythm : Sinus Rhythm  Ectopy : none  HR : 90  MO : 0.20  QRS : 0.08  QT : 0.40    Any further monitoring will be done by patient's Primary Nurse

## 2018-04-06 NOTE — PROGRESS NOTES
Nursing care plan includes knowledge deficit, potential for discomfort, potential for compromised cardiac output.  POC includes teaching, comfort measures and reassurance, and access to code cart, cardiology stand by and availability of rapid response team.  Pt verbalizes good understanding of benefits and risks of pharmacological cardiac stress test.  Informed consent obtained.  Lexiscan given, pt developed the following signs/symptoms:sob, flushed, stomach pressure.    VS stable, symptoms resolved.  To waiting room, fluids and/or snack given, awaiting second scan.  Nursing goals met.

## 2018-04-06 NOTE — DISCHARGE PLANNING
Care Transition Team Assessment    SW intern met with patient at bedside. Pt plans to discharge home son when medically able. Pt has no questions or concerns at this time.     Information Source  Orientation : Oriented x 4  Information Given By: Patient  Informant's Name: Denise   Who is responsible for making decisions for patient? : Patient    Readmission Evaluation  Is this a readmission?: No    Elopement Risk  Legal Hold: No  Ambulatory or Self Mobile in Wheelchair: Yes  Disoriented: No  Psychiatric Symptoms: None  History of Wandering: No  Elopement this Admit: No  Vocalizing Wanting to Leave: No  Displays Behaviors, Body Language Wanting to Leave: No-Not at Risk for Elopement  Elopement Risk: Not at Risk for Elopement    Interdisciplinary Discharge Planning  Primary Care Physician: Dr Salmon   Support Systems: Family Member(s)  Housing / Facility: 1 Story House  Mobility Issues: No    Discharge Preparedness  What is your plan after discharge?: Home with help  What are your discharge supports?: Spouse  Prior Functional Level: Independent with Activities of Daily Living  Difficulity with ADLs: None  Difficulity with IADLs: None    Functional Assesment  Prior Functional Level: Independent with Activities of Daily Living    Finances  Financial Barriers to Discharge: No  Prescription Coverage: Yes    Vision / Hearing Impairment  Vision Impairment : Yes  Right Eye Vision: Wears Glasses  Left Eye Vision: Wears Glasses  Hearing Impairment : No     Advance Directive  Advance Directive?: Living Will    Domestic Abuse  Have you ever been the victim of abuse or violence?: No  Physical Abuse or Sexual Abuse: No  Verbal Abuse or Emotional Abuse: No  Possible Abuse Reported to:: Not Applicable    Psychological Assessment  History of Substance Abuse: None  History of Psychiatric Problems: No  Non-compliant with Treatment: No  Newly Diagnosed Illness: No    Discharge Risks or Barriers  Discharge risks or barriers?:  No    Anticipated Discharge Information  Anticipated discharge disposition: Home  Discharge Address: [address] [address] [place] [address]  Discharge Contact Phone Number: [phone]    This note has been reviewed by Luz WOODY

## 2018-04-06 NOTE — ED NOTES
Patient states having chest pain, ERP notified and morphine 4mg IV given with effect. Will continue to monitor

## 2018-04-06 NOTE — DISCHARGE SUMMARY
CHIEF COMPLAINT ON ADMISSION  Chief Complaint   Patient presents with   • Chest Pain     left side that radiates to her left breast with some SOB onset yesterday, unable to take a deep breath without pain. This pt is followed-up by her PCP who recommended she come here.       CODE STATUS  Full Code    HPI & HOSPITAL COURSE  62 y.o. female with a history of pancreatic cancer status post Whipple who presented 4/5/2018 with chest pain.      She has had abdominal pain for months, was diagnosed with pancreatic cancer, is status post Whipple procedure. Yesterday, while at rest she developed centrally located chest discomfort. It did not change with exertion and was not associated with shortness of breath or nausea. It radiated slightly to her left axilla and has been relatively constant. She is not a palpitations, dizziness or lightheadedness. She did speak with the nurse at her GI physician's office regarding this pain today and he told her to come to the ER for evaluation. She's never had a heart attack in the past before. She does have occasional acid reflux symptoms. Denies any recent fever, chills or cough. Has had no leg swelling.      ER course: Troponin negative, x-ray no evidence of pneumonia. EKG no evidence of ischemia. She was admitted to rule out acute coronary syndrome.  CTA was negative for any evidence of PE.Troponins were trending and were negative. She had a stress test done which was also normal and did not show any ischemia. She will be following up with GI outpatient next week. Patient is at this point pain free, medically cleared with negative cardiac workup and will be discharged in stable condition. Patient understood and agreed with the above plan.     The patient recovered much more quickly than anticipated on admission.    Therefore, she is discharged in good and stable condition with close outpatient follow-up.    SPECIFIC OUTPATIENT FOLLOW-UP  pcp  gi    DISCHARGE PROBLEM LIST  Principal  Problem (Resolved):    Chest pain POA: Unknown  Active Problems:    Malignant neoplasm of head of pancreas (CMS-HCC) POA: Yes      Overview: 1/27 - Whipple procedure      Epidural catheter for pain      Surgery - ALEXIA Felder    Acid reflux disease POA: Yes      Overview: Premorbid      On PPI    Depression POA: Yes      Overview: Premorbid      Restart meds when taking po    Type II diabetes mellitus (CMS-HCC) POA: Unknown  Resolved Problems:    Hypokalemia POA: Unknown      FOLLOW UP  Future Appointments  Date Time Provider Department Center   4/7/2018 2:30 PM [facility]     No follow-up provider specified.    MEDICATIONS ON DISCHARGE   Denise Gaines   Home Medication Instructions CAROLINE:[id number]    Printed on:04/06/18 3708   Medication Information                      aspirin EC (ECOTRIN) 81 MG Tablet Delayed Response  Take 81 mg by mouth every day.             atorvastatin (LIPITOR) 10 MG Tab  Take 10 mg by mouth every evening.             Ergocalciferol (VITAMIN D2) 400 units Tab  Take 800 Units by mouth every evening.             esomeprazole (NEXIUM) 40 MG delayed-release capsule  Take 40 mg by mouth every evening.             fluticasone (FLONASE) 50 MCG/ACT nasal spray  Spray 2 Sprays in nose 2 times a day.             ibuprofen (MOTRIN) 400 MG Tab  Take 800 mg by mouth every 8 hours.             insulin aspart (NOVOLOG) 100 UNIT/ML Solution  Inject 5-7 Units as instructed 3 times a day before meals. Sliding Scale             insulin glargine (LANTUS) 100 UNIT/ML Solution  Inject 32 Units as instructed 2 times a day.             Multiple Vitamins-Minerals (MULTIVITAMIN ADULT PO)  Take 1 Tab by mouth every day.             Pancrelipase, Lip-Prot-Amyl, (CREON) 82823 UNITS Cap DR Particles  Take 2 Tabs by mouth 3 times a day, with meals.             Probiotic Product (PROBIOTIC ADVANCED PO)  Take 1 Cap by mouth every evening.             Pseudoephedrine-Guaifenesin (MUCINEX D PO)  Take 1  Tab by mouth every day.             ranitidine (ZANTAC) 150 MG Tab  Take 150 mg by mouth as needed for Heartburn (Or gas).             sertraline (ZOLOFT) 100 MG TABS  Take 150 mg by mouth every morning.                 DIET  Orders Placed This Encounter   Procedures   • Protocol 1313 Diet Order: Reg, No Decaf, No Caffeine - Cardiac Stress Test Pharmacological     Standing Status:   Standing     Number of Occurrences:   1     Order Specific Question:   Diet:     Answer:   Diabetic [3]     Order Specific Question:   Miscellaneous modifications:     Answer:   No Decaf, No Caffeine(for test) [11]     Comments:   Protocol 1313 Patient to have no caffeine for 12 hours prior to exam (decaf, coffee, cola, tea, chocolate)       ACTIVITY  As tolerated.  Weight bearing as tolerated      CONSULTATIONS  none    PROCEDURES  Stress test   NUCLEAR IMAGING INTERPRETATION   Normal Lexiscan myocardial perfusion study.   No evidence of ischemia or infarct.   LVEF 73%. No segmental wall motion abnormalities noted.   No ischemic changes with Regadenoson.   No arrhythmias.   No chest pain.   ECG INTERPRETATION   Negative stress ECG for ischemia.    4/5/2018 4:23 PM    HISTORY/REASON FOR EXAM:  Pain.  History of pancreatic cancer. Left-sided chest pain. Shortness of breath.  History of pancreatic cancer with Whipple procedure in 2009    TECHNIQUE/EXAM DESCRIPTION:  CT scan of the abdomen without and with contrast.    Initial precontrast images were obtained from the diaphragmatic domes through the iliac crests using helical technique.  Following nonionic contrast administration in an intravenous bolus fashion, and postcontrast, thin-section helical scanning obtained   from the diaphragmatic domes through the iliac crests.    100 mL of Omnipaque 350 nonionic contrast was administered.    Low dose optimization technique was utilized for this CT exam including automated exposure control and adjustment of the mA and/or kV according to patient  size.    COMPARISON: 2/27/2018.    FINDINGS:  Lung bases:    Please see dedicated report.    Abdomen:    The liver is unremarkable.    Multiple splenic granulomas.    The pancreas is grossly similar to recent exam. There are postsurgical changes from Whipple procedure. There is stranding of the peripancreatic fat and adjacent mesenteric root. Unchanged linear metallic density in the distal body and tail of the   pancreas. Previous fluid collection along the pancreatic tail appears less conspicuous.    Mild intrahepatic ductal dilatation.    The gallbladder is surgically absent    The adrenal glands are normal in size.    The kidneys enhance symmetrically.    The abdominal aorta is normal in caliber.    Multiple mildly prominent retroperitoneal lymph nodes.    No bowel wall thickening or bowel dilatation.    Sigmoid diverticula.    Pelvis:    No obvious abnormality seen in the pelvic structures but evaluation limited on CT.    No lymph node enlargement, free fluid, or free air in the abdomen or pelvis.    No aggressive bone lesions are seen.    ___________________________________   Impression         1. Previous fluid collection in the tail of the pancreas appear less conspicuous.    Grossly similar fat stranding surrounding the pancreas with unchanged high density stent in the distal body/tail of the pancreas.    2. Status post Whipple procedure. Mild intrahepatic biliary ductal dilatation.         LABORATORY  Lab Results   Component Value Date/Time    SODIUM 136 04/06/2018 04:11 AM    POTASSIUM 3.6 04/06/2018 04:11 AM    CHLORIDE 105 04/06/2018 04:11 AM    CO2 23 04/06/2018 04:11 AM    GLUCOSE 277 (H) 04/06/2018 04:11 AM    BUN 9 04/06/2018 04:11 AM    CREATININE 0.55 04/06/2018 04:11 AM        Lab Results   Component Value Date/Time    WBC 10.4 04/06/2018 04:11 AM    HEMOGLOBIN 11.6 (L) 04/06/2018 04:11 AM    HEMATOCRIT 35.2 (L) 04/06/2018 04:11 AM    PLATELETCT 275 04/06/2018 04:11 AM        Total time of the  discharge process exceeds 38 minutes

## 2018-04-07 ENCOUNTER — APPOINTMENT (OUTPATIENT)
Dept: RADIOLOGY | Facility: MEDICAL CENTER | Age: 63
End: 2018-04-07
Attending: INTERNAL MEDICINE
Payer: MEDICARE

## 2018-05-02 ENCOUNTER — HOSPITAL ENCOUNTER (OUTPATIENT)
Dept: LAB | Facility: MEDICAL CENTER | Age: 63
End: 2018-05-02
Attending: FAMILY MEDICINE
Payer: MEDICARE

## 2018-05-02 PROCEDURE — 87086 URINE CULTURE/COLONY COUNT: CPT

## 2018-05-03 LAB
AMBIGUOUS DTTM AMBI4: NORMAL
SIGNIFICANT IND 70042: NORMAL
SITE SITE: NORMAL
SOURCE SOURCE: NORMAL

## 2018-05-05 LAB
BACTERIA UR CULT: NORMAL
SIGNIFICANT IND 70042: NORMAL
SITE SITE: NORMAL
SOURCE SOURCE: NORMAL

## 2018-11-26 ENCOUNTER — HOSPITAL ENCOUNTER (OUTPATIENT)
Facility: MEDICAL CENTER | Age: 63
End: 2018-11-26
Payer: MEDICARE

## 2018-11-26 PROCEDURE — 82274 ASSAY TEST FOR BLOOD FECAL: CPT

## 2018-11-28 LAB — HEMOCCULT STL QL IA: NEGATIVE

## 2019-02-08 ENCOUNTER — HOSPITAL ENCOUNTER (OUTPATIENT)
Dept: RADIOLOGY | Facility: MEDICAL CENTER | Age: 64
End: 2019-02-08
Attending: FAMILY MEDICINE
Payer: MEDICARE

## 2019-02-08 DIAGNOSIS — K21.00 REFLUX ESOPHAGITIS: ICD-10-CM

## 2019-02-08 PROCEDURE — 74245 DX-UPPER GI-SMALL BOWEL FOLLOW THRU: CPT

## 2019-02-08 PROCEDURE — A9270 NON-COVERED ITEM OR SERVICE: HCPCS | Performed by: FAMILY MEDICINE

## 2019-02-08 PROCEDURE — 700112 HCHG RX REV CODE 229: Performed by: FAMILY MEDICINE

## 2019-02-08 RX ADMIN — ANTACID/ANTIFLATULENT 1 PACKET: 380; 550; 10; 10 GRANULE, EFFERVESCENT ORAL at 13:45

## 2019-05-07 ENCOUNTER — OFFICE VISIT (OUTPATIENT)
Dept: URGENT CARE | Facility: CLINIC | Age: 64
End: 2019-05-07
Payer: MEDICARE

## 2019-05-07 VITALS
BODY MASS INDEX: 29.83 KG/M2 | TEMPERATURE: 97.3 F | HEART RATE: 76 BPM | DIASTOLIC BLOOD PRESSURE: 88 MMHG | HEIGHT: 61 IN | OXYGEN SATURATION: 96 % | WEIGHT: 158 LBS | SYSTOLIC BLOOD PRESSURE: 130 MMHG | RESPIRATION RATE: 14 BRPM

## 2019-05-07 DIAGNOSIS — S09.90XA INJURY OF HEAD, INITIAL ENCOUNTER: ICD-10-CM

## 2019-05-07 PROCEDURE — 99214 OFFICE O/P EST MOD 30 MIN: CPT | Performed by: NURSE PRACTITIONER

## 2019-05-07 ASSESSMENT — ENCOUNTER SYMPTOMS
PHOTOPHOBIA: 0
NAUSEA: 1
TREMORS: 0
TINGLING: 0
MEMORY LOSS: 0
EYE DISCHARGE: 0
SPEECH CHANGE: 0
CHILLS: 0
SENSORY CHANGE: 0
VOMITING: 0
FOCAL WEAKNESS: 0
HEADACHES: 1
SEIZURES: 0
FEVER: 0
EYE REDNESS: 0
DISORIENTATION: 0
LOSS OF CONSCIOUSNESS: 0
EYE PAIN: 0
DIZZINESS: 0
DOUBLE VISION: 0
WEAKNESS: 0
NUMBNESS: 0
BLURRED VISION: 0

## 2019-05-08 ENCOUNTER — TELEPHONE (OUTPATIENT)
Dept: URGENT CARE | Facility: CLINIC | Age: 64
End: 2019-05-08

## 2019-05-08 NOTE — TELEPHONE ENCOUNTER
Followed up with patient by phone; states she is feelingbetter today.  NO worsening of symptoms overnight, no neurologic changes. States Advil is helping and swelling is better. No further questions at this time.  Instructed to go to ER or return to office for persistent or worsening of symptoms. Patient verbalized understanding and agreement with plan.

## 2019-05-08 NOTE — PROGRESS NOTES
Subjective:      Denise Gaines is a 63 y.o. female who presents with No chief complaint on file.    Past Medical History:   Diagnosis Date   • Acid reflux    • Anesthesia     PONV   • Arthritis 4/7/17    Bilateral shoulders and right hand   • ASTHMA     allergy induced-uses inhalers prn   • Blood clotting disorder ([facility]) dx 10/2016    Right eye retina-Treated with laser and injections   • Bowel habit changes     Diarrhea chronic   • Bronchitis 12/2017   • Cancer ([facility]) 12/2013-dx    pancreatic CA. Whipple procedure 1/2014   • Carotid artery plaque 2016    per ultrasound   • Cataract     Bilateral-mild   • Colitis since age 14   • Diabetes ([facility]) 2014    on Lantus S/P whipple   • Heart burn     treated with nexium and prn zantac   • Heart murmur     since birth   • High cholesterol    • Indigestion    • MRSA (methicillin resistant Staphylococcus aureus) 11/2016    Left hand-cleared    • MRSA (methicillin resistant Staphylococcus aureus) 1/30/2017    Back of left thigh-cleared.   • Pain 01/2018    lower left abdomen   • Pneumonia 2012   • Psychiatric problem     depression/ anxiety   • Shoulder pain 4/7/17    Chronic to right shoulder   • Snoring    • Unspecified urinary incontinence     wears pad   • Urinary bladder disorder     incontinence     Social History     Social History   • Marital status: Single     Spouse name: N/A   • Number of children: N/A   • Years of education: N/A     Occupational History   • Not on file.     Social History Main Topics   • Smoking status: Never Smoker   • Smokeless tobacco: Never Used   • Alcohol use No   • Drug use: No   • Sexual activity: Not on file     Other Topics Concern   • Not on file     Social History Narrative   • No narrative on file     History reviewed. No pertinent family history.    Allergies: Bee; Penicillins; Clindamycin; Codeine; Percocet [oxycodone-acetaminophen]; Sulfa drugs; and Vicodin [hydrocodone-acetaminophen]    Patient is a 63-year-old female who  presents today with complaint of head injury and subsequent headache.  The initial injury occurred 48 hours ago.  [place] some wet pavement.  Patient states she fell to her left side while wearing sunglasses.  The left frontal area of her head hit the ground.  Patient denies loss of consciousness, and states that she was not dazed.  Since then, she has developed some bruising localized to the area of injury.  States her headache today was worse, but her symptoms have been relieved with ibuprofen.  She does not currently have nausea though she states she was slightly nauseous earlier when the headache was at its worst.  No blurred vision.  She has not had any confusion or ataxia.  She is not on blood thinners.        Head Injury    The incident occurred 2 days ago. The injury mechanism was a direct blow. There was no loss of consciousness. There was no blood loss. The quality of the pain is described as aching. Pain severity now: Mild to moderate. The pain has been fluctuating since the injury. Associated symptoms include headaches. Pertinent negatives include no blurred vision, disorientation, memory loss, numbness, tinnitus, vomiting or weakness. She has tried NSAIDs and ice for the symptoms. The treatment provided significant relief.       Review of Systems   Constitutional: Positive for malaise/fatigue. Negative for chills and fever.   HENT: Negative for ear discharge, hearing loss, nosebleeds and tinnitus.    Eyes: Negative for blurred vision, double vision, photophobia, pain, discharge and redness.   Gastrointestinal: Positive for nausea. Negative for vomiting.   Neurological: Positive for headaches. Negative for dizziness, tingling, tremors, sensory change, speech change, focal weakness, seizures, loss of consciousness, weakness and numbness.   Psychiatric/Behavioral: Negative for memory loss.   All other systems reviewed and are  "negative.         Objective:     /88 (BP Location: Right arm, Patient Position: Sitting)   Pulse 76   Temp 36.3 °C (97.3 °F)   Resp 14   Ht 1.549 m (5' 1\")   Wt 71.7 kg (158 lb)   SpO2 96%   BMI 29.85 kg/m²      Physical Exam   Constitutional: She is oriented to person, place, and time. She appears well-developed and well-nourished.   HENT:   Head:       Right Ear: External ear normal.   Left Ear: External ear normal.   Nose: Nose normal.   Mouth/Throat: Oropharynx is clear and moist. No oropharyngeal exudate.   Contusion with discoloration to the left frontal area.  There is some discoloration over the eyelid which appears to have been blood that is drained from the initial injury.  There is no point tenderness over the right or left maxilla, no right frontal tenderness, mild left frontal tenderness with no step-off or deformity noted.   Eyes: Pupils are equal, round, and reactive to light. Conjunctivae and EOM are normal. Right eye exhibits no discharge. Left eye exhibits no discharge.   Neck: Normal range of motion. Neck supple.   Cardiovascular: Normal rate and regular rhythm.    Pulmonary/Chest: Effort normal and breath sounds normal.   Neurological: She is alert and oriented to person, place, and time. She has normal strength. She displays no atrophy and no tremor. No cranial nerve deficit or sensory deficit. She exhibits normal muscle tone. She displays a negative Romberg sign. She displays no seizure activity. Coordination and gait normal. GCS eye subscore is 4. GCS verbal subscore is 5. GCS motor subscore is 6.   Cranial nerves II through XII intact.  Cerebellar function intact.  Motor coordination intact.  Proprioception intact.  Romberg negative, no pronator drift.  Pupils equally round and reactive, EOMs intact.  Facial features symmetric with equal movement.  Speech is clear and logical.  TMs clear, no hemotympanum noted.  Neck is soft and supple.   5/5 and equal in the upper " extremities.  Strength is 5/5 and equal in the upper extremities.  Strength 5/5 and equal in the lower extremities.  Shoulder shrug equal.  Gait is even and steady.   Skin: Skin is warm and dry.   Psychiatric: She has a normal mood and affect. Her behavior is normal. Judgment and thought content normal.     At this time, patient has had no history of loss of consciousness, cranial nerve exam is within normal limits, and she is not on blood thinners.  I do not have access to outpatient specialty imaging at this time, however with history and physical exam I believe close observation is reasonable and warranted at this time.  I discussed this with the patient and she verbalized understanding and agreement.  I have discussed verbally with patient symptoms to watch for with head injury such as blurred vision increasing headache, vomiting, confusion, weakness, and inability to walk.  I have also provided written discharge instructions for symptoms to watch for with closed head injuries.  I advised patient also to follow-up with us tomorrow if her headache is worse and also ER precautions for any worsening of symptoms tonight.  Patient verbalized understanding and agreement.  No further questions at this time.          Assessment/Plan:     1. Injury of head, initial encounter    See note above  Written instructions given for monitoring closed head injury  Ibuprofen as needed for headache  Ice as needed  ER precautions for worsening of symptoms  Return to urgent care tomorrow for any further questions or concerns.

## 2019-05-08 NOTE — PATIENT INSTRUCTIONS
Head Injury, Adult  There are many types of head injuries. Head injuries can be as minor as a bump, or they can be more severe. More severe head injuries include:  · A jarring injury to the brain (concussion).  · A bruise of the brain (contusion). This means there is bleeding in the brain that can cause swelling.  · A cracked skull (skull fracture).  · Bleeding in the brain that collects, clots, and forms a bump (hematoma).  After a head injury, you may need to be observed for a while in the emergency department or urgent care. Sometimes admission to the hospital is needed.  After a head injury has happened, most problems occur within the first 24 hours, but side effects may occur up to 7-10 days after the injury. It is important to watch your condition for any changes.  What are the causes?  There are many possible causes of a head injury. A serious head injury may happen to someone who is in a car accident (motor vehicle collision). Other causes of major head injuries include bicycle or motorcycle accidents, sports injuries, and falls.  Risk factors  This condition is more likely to occur in people who:  · Drink a lot of alcohol or use drugs.  · Are over the age of 65.  · Are at risk for falls.  What are the symptoms?  There are many possible symptoms of a head injury. Visible symptoms of a head injury include a bruise, bump, or bleeding at the site of the injury. Other non-visible symptoms include:  · Feeling sleepy or not being able to stay awake.  · Passing out.  · Headache.  · Seizures.  · Dizziness.  · Confusion.  · Memory problems.  · Nausea or vomiting.  Other possible symptoms that may develop after the head injury include:  · Poor attention and concentration.  · Fatigue or tiring easily.  · Irritability.  · Being uncomfortable around bright lights or loud noises.  · Anxiety or depression.  · Disturbed sleep.  How is this diagnosed?  This condition can usually be diagnosed based on your symptoms, a  description of the injury, and a physical exam. You may also have imaging tests done, such as a CT scan or MRI. You will also be closely watched.  How is this treated?  Treatment for this condition depends on the severity and type of injury you have. The main goal of treatment is to prevent complications and allow the brain time to heal.  For mild head injury, you may be sent home and treatment may include:  · Observation. A responsible adult should stay with you for 24 hours after your injury and check on you often.  · Physical rest.  · Brain rest.  · Pain medicines.  For severe brain injury, treatment may include:  · Close observation. This includes hospitalization with frequent physical exams. You may need to go to a hospital that specializes in head injury.  · Pain medicines.  · Breathing support. This may include using a ventilator.  · Managing the pressure inside the brain (intracranial pressure, or ICP). This may include:  ¨ Monitoring the ICP.  ¨ Giving medicines to decrease the ICP.  ¨ Positioning you to decrease the ICP.  · Medicine to prevent seizures.  · Surgery to stop bleeding or to remove blood clots (craniotomy).  · Surgery to remove part of the skull (decompressive craniectomy). This allows room for the brain to swell.  Follow these instructions at home:  Activity  · Rest as much as possible and avoid activities that are physically hard or tiring.  · Make sure you get enough sleep.  · Limit activities that require a lot of thought or attention, such as:  ¨ Watching TV.  ¨ Playing memory games and puzzles.  ¨ Job-related work or homework.  ¨ Working on the computer, social media, and texting.  · Avoid activities that could cause another head injury, such as playing sports, until your health care provider approves. Having another head injury, especially before the first one has healed, can be dangerous.  · Ask your health care provider when it is safe for you to return to your regular activities,  including work or school. Ask your health care provider for a step-by-step plan for gradually returning to activities.  · Ask your health care provider when you can drive, ride a bicycle, or use heavy machinery. Your ability to react may be slower after a brain injury. Never do these activities if you are dizzy.  Lifestyle  · Do not drink alcohol until your health care provider approves, and avoid drug use. Alcohol and certain drugs may slow your recovery and can put you at risk of further injury.  · If it is harder than usual to remember things, write them down.  · If you are easily distracted, try to do one thing at a time.  · Talk with family members or close friends when making important decisions.  · Tell your friends, family, a trusted colleague, and  about your injury, symptoms, and restrictions. Have them watch for any new or worsening problems.  General instructions  · Take over-the-counter and prescription medicines only as told by your health care provider.  · Have someone stay with you for 24 hours after your head injury. This person should watch you for any changes in your symptoms and be ready to seek medical help, as needed.  · Keep all follow-up visits as told by your health care provider. This is important.  Prevention  · Work on improving your balance and strength to avoid falls.  · Wear a seatbelt when you are in a moving vehicle.  · Wear a helmet when riding a bicycle, skiing, or doing any other sport or activity that has a risk of injury.  · Drink alcohol only in moderation.  · Take safety measures in your home, such as:  ¨ Removing clutter and tripping hazards from floors and stairways.  ¨ Using grab bars in bathrooms and handrails by stairs.  ¨ Placing non-slip mats on floors and in bathtubs.  ¨ Improving lighting in dim areas.  Get help right away if:  · You have:  ¨ A severe headache that is not helped by medicine.  ¨ Trouble walking, have weakness in your arms and legs, or lose  your balance.  ¨ Clear or bloody fluid coming from your nose or ears.  ¨ Changes in your vision.  ¨ A seizure.  · You vomit.  · Your symptoms get worse.  · Your speech is slurred.  · You pass out.  · You are sleepier and have trouble staying awake.  · Your pupils change size.  These symptoms may represent a serious problem that is an emergency. Do not wait to see if the symptoms will go away. Get medical help right away. Call your local emergency services (911 in the [place] Do not drive yourself to the hospital.   This information is not intended to replace advice given to you by your health care provider. Make sure you discuss any questions you have with your health care provider.  Document Released: 12/18/2006 Document Revised: 07/14/2017 Document Reviewed: 06/27/2017  [employer]vier Interactive Patient Education © 2017 [employer]

## 2019-05-20 ENCOUNTER — APPOINTMENT (OUTPATIENT)
Dept: RADIOLOGY | Facility: MEDICAL CENTER | Age: 64
End: 2019-05-20
Attending: INTERNAL MEDICINE
Payer: MEDICARE

## 2019-05-20 ENCOUNTER — APPOINTMENT (OUTPATIENT)
Dept: RADIOLOGY | Facility: MEDICAL CENTER | Age: 64
End: 2019-05-20
Attending: FAMILY MEDICINE
Payer: MEDICARE

## 2019-05-22 ENCOUNTER — HOSPITAL ENCOUNTER (OUTPATIENT)
Dept: RADIOLOGY | Facility: MEDICAL CENTER | Age: 64
End: 2019-05-22
Attending: FAMILY MEDICINE
Payer: MEDICARE

## 2019-05-22 ENCOUNTER — APPOINTMENT (OUTPATIENT)
Dept: RADIOLOGY | Facility: MEDICAL CENTER | Age: 64
End: 2019-05-22
Attending: INTERNAL MEDICINE
Payer: MEDICARE

## 2019-05-22 DIAGNOSIS — Z12.39 SCREENING BREAST EXAMINATION: ICD-10-CM

## 2019-05-22 PROCEDURE — 77063 BREAST TOMOSYNTHESIS BI: CPT

## 2019-05-28 ENCOUNTER — HOSPITAL ENCOUNTER (OUTPATIENT)
Dept: RADIOLOGY | Facility: MEDICAL CENTER | Age: 64
End: 2019-05-28
Attending: INTERNAL MEDICINE
Payer: MEDICARE

## 2019-05-28 DIAGNOSIS — Z85.07 PERSONAL HISTORY OF MALIGNANT NEOPLASM OF PANCREAS: ICD-10-CM

## 2019-05-28 PROCEDURE — 700117 HCHG RX CONTRAST REV CODE 255: Performed by: INTERNAL MEDICINE

## 2019-05-28 PROCEDURE — 74170 CT ABD WO CNTRST FLWD CNTRST: CPT

## 2019-05-28 RX ADMIN — IOHEXOL 100 ML: 350 INJECTION, SOLUTION INTRAVENOUS at 10:26

## 2019-08-23 ENCOUNTER — HOSPITAL ENCOUNTER (OUTPATIENT)
Dept: RADIOLOGY | Facility: MEDICAL CENTER | Age: 64
End: 2019-08-23
Attending: CHIROPRACTOR
Payer: MEDICARE

## 2019-08-23 DIAGNOSIS — M54.5 LOW BACK PAIN, UNSPECIFIED BACK PAIN LATERALITY, UNSPECIFIED CHRONICITY, WITH SCIATICA PRESENCE UNSPECIFIED: ICD-10-CM

## 2019-08-23 PROCEDURE — 72170 X-RAY EXAM OF PELVIS: CPT

## 2019-12-08 ENCOUNTER — HOSPITAL ENCOUNTER (OUTPATIENT)
Dept: RADIOLOGY | Facility: MEDICAL CENTER | Age: 64
End: 2019-12-08
Attending: INTERNAL MEDICINE
Payer: MEDICARE

## 2019-12-08 DIAGNOSIS — C25.0 MALIGNANT NEOPLASM OF HEAD OF PANCREAS (HCC): ICD-10-CM

## 2019-12-08 DIAGNOSIS — R79.89 ABNORMAL LIVER FUNCTION TEST: ICD-10-CM

## 2019-12-08 DIAGNOSIS — K86.89 PANCREATIC INSUFFICIENCY: ICD-10-CM

## 2019-12-08 DIAGNOSIS — R79.89 ABNORMAL LIVER FUNCTION TESTS: ICD-10-CM

## 2019-12-08 PROCEDURE — 74181 MRI ABDOMEN W/O CONTRAST: CPT

## 2019-12-08 PROCEDURE — 76700 US EXAM ABDOM COMPLETE: CPT

## 2020-01-03 RX ORDER — SERTRALINE HYDROCHLORIDE 100 MG/1
100 TABLET, FILM COATED ORAL DAILY
COMMUNITY
End: 2020-01-03

## 2020-01-03 RX ORDER — ESOMEPRAZOLE MAGNESIUM 40 MG/1
40 CAPSULE, DELAYED RELEASE ORAL EVERY EVENING
COMMUNITY
End: 2022-06-14 | Stop reason: SDUPTHER

## 2020-01-03 RX ORDER — FLUTICASONE PROPIONATE 50 MCG
1 SPRAY, SUSPENSION (ML) NASAL 2 TIMES DAILY
COMMUNITY

## 2020-01-03 RX ORDER — SODIUM PHOSPHATE,MONO-DIBASIC 19G-7G/118
1500 ENEMA (ML) RECTAL DAILY
COMMUNITY
End: 2021-07-29

## 2020-01-03 RX ORDER — GLUCOSAMINE SULFATE DIPOT CHLR 1000 MG
1000 TABLET ORAL DAILY
COMMUNITY
End: 2021-10-06

## 2020-01-03 RX ORDER — ATORVASTATIN CALCIUM 10 MG/1
10 TABLET, FILM COATED ORAL EVERY EVENING
COMMUNITY
End: 2022-06-14 | Stop reason: SDUPTHER

## 2020-01-03 RX ORDER — ESOMEPRAZOLE MAGNESIUM 20 MG/1
20 GRANULE, DELAYED RELEASE ORAL PRN
COMMUNITY
End: 2021-07-29

## 2020-01-03 SDOH — HEALTH STABILITY: MENTAL HEALTH: HOW OFTEN DO YOU HAVE A DRINK CONTAINING ALCOHOL?: MONTHLY OR LESS

## 2020-01-03 NOTE — OR NURSING
"Preparing for your Procedure information\" sheet given to patient with verbal and written instructions. Patient instructed to continue prescribed medications through the day before surgery, instructed to take the following medications the day of surgery per anesthesia protocol  nexium and zoloft.  Pt will stop insulin pump in am and bring her monitoring device and cane on DOS. MB  "

## 2020-01-09 ENCOUNTER — ANESTHESIA EVENT (OUTPATIENT)
Dept: SURGERY | Facility: MEDICAL CENTER | Age: 65
End: 2020-01-09
Payer: MEDICARE

## 2020-01-10 ENCOUNTER — ANESTHESIA (OUTPATIENT)
Dept: SURGERY | Facility: MEDICAL CENTER | Age: 65
End: 2020-01-10
Payer: MEDICARE

## 2020-01-10 ENCOUNTER — HOSPITAL ENCOUNTER (OUTPATIENT)
Facility: MEDICAL CENTER | Age: 65
End: 2020-01-10
Attending: ORTHOPAEDIC SURGERY | Admitting: ORTHOPAEDIC SURGERY
Payer: MEDICARE

## 2020-01-10 VITALS
BODY MASS INDEX: 29.18 KG/M2 | RESPIRATION RATE: 15 BRPM | HEIGHT: 61 IN | SYSTOLIC BLOOD PRESSURE: 116 MMHG | OXYGEN SATURATION: 95 % | HEART RATE: 72 BPM | WEIGHT: 154.54 LBS | TEMPERATURE: 97 F | DIASTOLIC BLOOD PRESSURE: 72 MMHG

## 2020-01-10 LAB — GLUCOSE BLD-MCNC: 125 MG/DL (ref 65–99)

## 2020-01-10 PROCEDURE — 160036 HCHG PACU - EA ADDL 30 MINS PHASE I: Performed by: ORTHOPAEDIC SURGERY

## 2020-01-10 PROCEDURE — 160041 HCHG SURGERY MINUTES - EA ADDL 1 MIN LEVEL 4: Performed by: ORTHOPAEDIC SURGERY

## 2020-01-10 PROCEDURE — 160035 HCHG PACU - 1ST 60 MINS PHASE I: Performed by: ORTHOPAEDIC SURGERY

## 2020-01-10 PROCEDURE — C1713 ANCHOR/SCREW BN/BN,TIS/BN: HCPCS | Performed by: ORTHOPAEDIC SURGERY

## 2020-01-10 PROCEDURE — 160047 HCHG PACU  - EA ADDL 30 MINS PHASE II: Performed by: ORTHOPAEDIC SURGERY

## 2020-01-10 PROCEDURE — 160046 HCHG PACU - 1ST 60 MINS PHASE II: Performed by: ORTHOPAEDIC SURGERY

## 2020-01-10 PROCEDURE — 160002 HCHG RECOVERY MINUTES (STAT): Performed by: ORTHOPAEDIC SURGERY

## 2020-01-10 PROCEDURE — 501838 HCHG SUTURE GENERAL: Performed by: ORTHOPAEDIC SURGERY

## 2020-01-10 PROCEDURE — 500423 HCHG DRESSING, ABD COMBINE: Performed by: ORTHOPAEDIC SURGERY

## 2020-01-10 PROCEDURE — A6222 GAUZE <=16 IN NO W/SAL W/O B: HCPCS | Performed by: ORTHOPAEDIC SURGERY

## 2020-01-10 PROCEDURE — 700101 HCHG RX REV CODE 250: Performed by: ANESTHESIOLOGY

## 2020-01-10 PROCEDURE — 160009 HCHG ANES TIME/MIN: Performed by: ORTHOPAEDIC SURGERY

## 2020-01-10 PROCEDURE — 160048 HCHG OR STATISTICAL LEVEL 1-5: Performed by: ORTHOPAEDIC SURGERY

## 2020-01-10 PROCEDURE — 700111 HCHG RX REV CODE 636 W/ 250 OVERRIDE (IP): Performed by: ORTHOPAEDIC SURGERY

## 2020-01-10 PROCEDURE — 160025 RECOVERY II MINUTES (STATS): Performed by: ORTHOPAEDIC SURGERY

## 2020-01-10 PROCEDURE — 700102 HCHG RX REV CODE 250 W/ 637 OVERRIDE(OP): Performed by: ANESTHESIOLOGY

## 2020-01-10 PROCEDURE — 700111 HCHG RX REV CODE 636 W/ 250 OVERRIDE (IP): Performed by: ANESTHESIOLOGY

## 2020-01-10 PROCEDURE — 700105 HCHG RX REV CODE 258: Performed by: ORTHOPAEDIC SURGERY

## 2020-01-10 PROCEDURE — 502000 HCHG MISC OR IMPLANTS RC 0278: Performed by: ORTHOPAEDIC SURGERY

## 2020-01-10 PROCEDURE — 82962 GLUCOSE BLOOD TEST: CPT

## 2020-01-10 PROCEDURE — 502581 HCHG PACK, SHOULDER ARTHROSCOPY: Performed by: ORTHOPAEDIC SURGERY

## 2020-01-10 PROCEDURE — A9270 NON-COVERED ITEM OR SERVICE: HCPCS | Performed by: ANESTHESIOLOGY

## 2020-01-10 PROCEDURE — 502240 HCHG MISC OR SUPPLY RC 0272: Performed by: ORTHOPAEDIC SURGERY

## 2020-01-10 PROCEDURE — 700101 HCHG RX REV CODE 250: Performed by: ORTHOPAEDIC SURGERY

## 2020-01-10 PROCEDURE — 160029 HCHG SURGERY MINUTES - 1ST 30 MINS LEVEL 4: Performed by: ORTHOPAEDIC SURGERY

## 2020-01-10 PROCEDURE — A4450 NON-WATERPROOF TAPE: HCPCS | Performed by: ORTHOPAEDIC SURGERY

## 2020-01-10 PROCEDURE — 64415 NJX AA&/STRD BRCH PLXS IMG: CPT | Performed by: ORTHOPAEDIC SURGERY

## 2020-01-10 DEVICE — ANCHOR SUTURE ICONIX 3 WITH 3 STRANDS #2 FORCE FIBER 2.3MM (5EA/BX): Type: IMPLANTABLE DEVICE | Site: SHOULDER | Status: FUNCTIONAL

## 2020-01-10 DEVICE — GRAFT SOFT TISSUE DERMAL MATRIX ALLOMEND 4X8CM: Type: IMPLANTABLE DEVICE | Site: SHOULDER | Status: FUNCTIONAL

## 2020-01-10 DEVICE — IMPLANTABLE DEVICE: Type: IMPLANTABLE DEVICE | Site: SHOULDER | Status: FUNCTIONAL

## 2020-01-10 DEVICE — ANCHOR KNOTLESS REELX STT 4.5MM (5EA/BX): Type: IMPLANTABLE DEVICE | Site: SHOULDER | Status: FUNCTIONAL

## 2020-01-10 RX ORDER — ONDANSETRON 2 MG/ML
4 INJECTION INTRAMUSCULAR; INTRAVENOUS
Status: DISCONTINUED | OUTPATIENT
Start: 2020-01-10 | End: 2020-01-10 | Stop reason: HOSPADM

## 2020-01-10 RX ORDER — OXYCODONE HCL 5 MG/5 ML
10 SOLUTION, ORAL ORAL
Status: DISCONTINUED | OUTPATIENT
Start: 2020-01-10 | End: 2020-01-10 | Stop reason: HOSPADM

## 2020-01-10 RX ORDER — DIPHENHYDRAMINE HYDROCHLORIDE 50 MG/ML
12.5 INJECTION INTRAMUSCULAR; INTRAVENOUS
Status: DISCONTINUED | OUTPATIENT
Start: 2020-01-10 | End: 2020-01-10 | Stop reason: HOSPADM

## 2020-01-10 RX ORDER — ACETAMINOPHEN 500 MG
1000 TABLET ORAL ONCE
Status: COMPLETED | OUTPATIENT
Start: 2020-01-10 | End: 2020-01-10

## 2020-01-10 RX ORDER — ROPIVACAINE HYDROCHLORIDE 5 MG/ML
INJECTION, SOLUTION EPIDURAL; INFILTRATION; PERINEURAL PRN
Status: DISCONTINUED | OUTPATIENT
Start: 2020-01-10 | End: 2020-01-10 | Stop reason: SURG

## 2020-01-10 RX ORDER — HALOPERIDOL 5 MG/ML
1 INJECTION INTRAMUSCULAR
Status: DISCONTINUED | OUTPATIENT
Start: 2020-01-10 | End: 2020-01-10 | Stop reason: HOSPADM

## 2020-01-10 RX ORDER — SODIUM CHLORIDE, SODIUM LACTATE, POTASSIUM CHLORIDE, CALCIUM CHLORIDE 600; 310; 30; 20 MG/100ML; MG/100ML; MG/100ML; MG/100ML
INJECTION, SOLUTION INTRAVENOUS CONTINUOUS
Status: DISCONTINUED | OUTPATIENT
Start: 2020-01-10 | End: 2020-01-10 | Stop reason: HOSPADM

## 2020-01-10 RX ORDER — TRAMADOL HYDROCHLORIDE 50 MG/1
50 TABLET ORAL
Status: DISCONTINUED | OUTPATIENT
Start: 2020-01-10 | End: 2020-01-10 | Stop reason: HOSPADM

## 2020-01-10 RX ORDER — MEPERIDINE HYDROCHLORIDE 25 MG/ML
6.25 INJECTION INTRAMUSCULAR; INTRAVENOUS; SUBCUTANEOUS
Status: DISCONTINUED | OUTPATIENT
Start: 2020-01-10 | End: 2020-01-10 | Stop reason: HOSPADM

## 2020-01-10 RX ORDER — BACITRACIN 50000 [IU]/1
INJECTION, POWDER, FOR SOLUTION INTRAMUSCULAR
Status: DISCONTINUED | OUTPATIENT
Start: 2020-01-10 | End: 2020-01-10 | Stop reason: HOSPADM

## 2020-01-10 RX ORDER — DEXAMETHASONE SODIUM PHOSPHATE 4 MG/ML
INJECTION, SOLUTION INTRA-ARTICULAR; INTRALESIONAL; INTRAMUSCULAR; INTRAVENOUS; SOFT TISSUE PRN
Status: DISCONTINUED | OUTPATIENT
Start: 2020-01-10 | End: 2020-01-10 | Stop reason: SURG

## 2020-01-10 RX ORDER — GABAPENTIN 300 MG/1
300 CAPSULE ORAL ONCE
Status: COMPLETED | OUTPATIENT
Start: 2020-01-10 | End: 2020-01-10

## 2020-01-10 RX ORDER — EPINEPHRINE 1 MG/ML(1)
AMPUL (ML) INJECTION
Status: DISCONTINUED | OUTPATIENT
Start: 2020-01-10 | End: 2020-01-10 | Stop reason: HOSPADM

## 2020-01-10 RX ORDER — ONDANSETRON 2 MG/ML
INJECTION INTRAMUSCULAR; INTRAVENOUS PRN
Status: DISCONTINUED | OUTPATIENT
Start: 2020-01-10 | End: 2020-01-10 | Stop reason: SURG

## 2020-01-10 RX ORDER — ALBUTEROL SULFATE 90 UG/1
1-2 AEROSOL, METERED RESPIRATORY (INHALATION) EVERY 6 HOURS PRN
Status: ON HOLD | COMMUNITY
End: 2022-03-04

## 2020-01-10 RX ORDER — ALBUTEROL SULFATE 1.25 MG/3ML
1.25 SOLUTION RESPIRATORY (INHALATION)
Status: ON HOLD | COMMUNITY
End: 2021-11-22

## 2020-01-10 RX ORDER — OXYCODONE HCL 5 MG/5 ML
5 SOLUTION, ORAL ORAL
Status: DISCONTINUED | OUTPATIENT
Start: 2020-01-10 | End: 2020-01-10 | Stop reason: HOSPADM

## 2020-01-10 RX ADMIN — ROPIVACAINE HYDROCHLORIDE 20 ML: 5 INJECTION, SOLUTION EPIDURAL; INFILTRATION; PERINEURAL at 07:58

## 2020-01-10 RX ADMIN — ACETAMINOPHEN 1000 MG: 500 TABLET, FILM COATED ORAL at 07:21

## 2020-01-10 RX ADMIN — GABAPENTIN 300 MG: 300 CAPSULE ORAL at 07:21

## 2020-01-10 RX ADMIN — SODIUM CHLORIDE, POTASSIUM CHLORIDE, SODIUM LACTATE AND CALCIUM CHLORIDE: 600; 310; 30; 20 INJECTION, SOLUTION INTRAVENOUS at 07:24

## 2020-01-10 RX ADMIN — EPHEDRINE SULFATE 10 MG: 50 INJECTION INTRAMUSCULAR; INTRAVENOUS; SUBCUTANEOUS at 08:47

## 2020-01-10 RX ADMIN — PROPOFOL 200 MG: 10 INJECTION, EMULSION INTRAVENOUS at 08:08

## 2020-01-10 RX ADMIN — ONDANSETRON 4 MG: 2 INJECTION INTRAMUSCULAR; INTRAVENOUS at 09:42

## 2020-01-10 RX ADMIN — SODIUM CHLORIDE, POTASSIUM CHLORIDE, SODIUM LACTATE AND CALCIUM CHLORIDE: 600; 310; 30; 20 INJECTION, SOLUTION INTRAVENOUS at 09:26

## 2020-01-10 RX ADMIN — DEXAMETHASONE SODIUM PHOSPHATE 4 MG: 4 INJECTION, SOLUTION INTRAMUSCULAR; INTRAVENOUS at 08:13

## 2020-01-10 RX ADMIN — CLINDAMYCIN PHOSPHATE 900 MG: 150 INJECTION, SOLUTION INTRAMUSCULAR; INTRAVENOUS at 08:12

## 2020-01-10 ASSESSMENT — PAIN SCALES - GENERAL: PAIN_LEVEL: 0

## 2020-01-10 NOTE — OR NURSING
0637: Brought patient back to pre-op and assumed care.  0729: Patient allergies and NPO status verified, home medication reconciliation completed and belongings secured. Patient verbalizes understanding of pain scale, expected course of stay and plan of care. Surgical site verified with patient. IV access established. Sequentials placed on legs.

## 2020-01-10 NOTE — OR SURGEON
Immediate Post OP Note    PreOp Diagnosis: RIGHT shoulder impingement, recurrent RCT, SLAP, mild glenohumeral joint OA    PostOp Diagnosis: SAME     Procedure(s): RIGHT  DECOMPRESSION, SHOULDER, ARTHROSCOPIC - SUBACROMIAL W/LABRAL DEBRIDEMENT, SUPERIOR CAPSULAR RECONSTRUCTION - Wound Class: Clean  TENODESIS, BICEPS - Wound Class: Clean  REPAIR, ROTATOR CUFF - Wound Class: Clean    Surgeon(s):  Mei Palacios M.D.    Anesthesiologist/Type of Anesthesia:  Anesthesiologist: Mina Sue M.D.  Anesthesia Technician: Derick Mancera/General    Surgical Staff:  Circulator: Deisy Tony R.N.; Annalise Castrejon R.N.  Scrub Person: Zoila Carter  Private Scrub: Juice Harris C.NNancyANancy    Specimens removed if any:  * No specimens in log *    Estimated Blood Loss: min    Findings: as above    Complications: none    Verito Jorgensen        1/10/2020 9:50 AM Mei Palacios M.D.

## 2020-01-10 NOTE — ANESTHESIA POSTPROCEDURE EVALUATION
Patient: Denise Gaines    Procedure Summary     Date:  01/10/20 Room / Location:   OR  / SURGERY [facility]    Anesthesia Start:  0805 Anesthesia Stop:  1002    Procedures:       DECOMPRESSION, SHOULDER, ARTHROSCOPIC - SUBACROMIAL W/LABRAL DEBRIDEMENT, SUPERIOR CAPSULAR RECONSTRUCTION, VALLADARES (Right Shoulder)      TENODESIS, BICEPS (Right Shoulder)      REPAIR, ROTATOR CUFF (Right Shoulder) Diagnosis:  (COMPLETE ROTATOR CUFF TEAR OR RUPTURE OF RIGHT SHOULDER)    Surgeon:  Mei Palacios M.D. Responsible Provider:  Mina Sue M.D.    Anesthesia Type:  general, peripheral nerve block ASA Status:  3          Final Anesthesia Type: general, peripheral nerve block  Last vitals  BP   Blood Pressure: 114/68    Temp   36.1 °C (97 °F)    Pulse   Pulse: 78   Resp   16    SpO2   96 %      Anesthesia Post Evaluation    Patient location during evaluation: PACU  Patient participation: complete - patient participated  Level of consciousness: awake and alert  Pain score: 0    Airway patency: patent  Anesthetic complications: no  Cardiovascular status: hemodynamically stable  Respiratory status: acceptable  Hydration status: euvolemic    PONV: none           Nurse Pain Score: 0 (NPRS)

## 2020-01-10 NOTE — OR NURSING
1103: Report received from FRANCE Rangel.     1107: Pt arrived to phase II in stretcher. Pt ambulated to restroom and successfully voided clear, yellow urine. Pt dressed with CNA at bedside. Pt AOx4. No signs of resp distress. Right shoulder dressing consists of ABD and cloth tape clean, dry, intact. No surgical drains present. Sling/immobilizer in place. +2 right radial pulse and <2 second cap refill in right fingers. Pt s/p nerve block to right shoulder. Decreased sensation at this time. Pt able to wiggle fingers and identify pressure on right arm and hand when placed by RN.      1129: Son at bedside. Pt states she is slightly dizzy but it is not tolerable. Denies vision changes. Tolerating PO fluids. No nausea or pain at this time.     1140: Discharge instructions given to pt and son regarding post anesthesia, post surgery, sling use, exercises, medications, signs of emergency and signs of infection to report. Pt and son verbalized understanding and asked relevant questions.     1200: Dr. Daniels at bedside discussing procedure with pt.    1205: Pt states dizziness is subsiding. Continues to deny pain or nausea. IV discontinued without complication.     1215: Pt taken to son's car via wheelchair.

## 2020-01-10 NOTE — ANESTHESIA TIME REPORT
Anesthesia Start and Stop Event Times     Date Time Event    1/10/2020 0738 Ready for Procedure     0805 Anesthesia Start     1002 Anesthesia Stop        Responsible Staff  01/10/20    Name Role Begin End    Mina Sue M.D. Anesth 0805 1002        Preop Diagnosis (Free Text):  Pre-op Diagnosis     COMPLETE ROTATOR CUFF TEAR OR RUPTURE OF RIGHT SHOULDER        Preop Diagnosis (Codes):    Post op Diagnosis  Nontraumatic complete tear of right rotator cuff      Premium Reason  Non-Premium    Comments: block

## 2020-01-10 NOTE — DISCHARGE INSTRUCTIONS
ACTIVITY: Rest and take it easy for the first 24 hours.  A responsible adult is recommended to remain with you during that time.  It is normal to feel sleepy.  We encourage you to not do anything that requires balance, judgment or coordination.    MILD FLU-LIKE SYMPTOMS ARE NORMAL. YOU MAY EXPERIENCE GENERALIZED MUSCLE ACHES, THROAT IRRITATION, HEADACHE AND/OR SOME NAUSEA.    FOR 24 HOURS DO NOT:  Drive, operate machinery or run household appliances.  Drink beer or alcoholic beverages.   Make important decisions or sign legal documents.    SPECIAL INSTRUCTIONS: Post-Operative Shoulder Instructions       Dressing and wound care: Keep your shoulder dressing clean and dry after surgery.  Be aware that some leaking of blood or fluid from your dressing can occur and is normal. You may remove your dressing 3 days after the operation.  Notice that you have a single incision and/or several small incisions that have been closed with stitches.  Cover each of these incisions with a light dressing or band-aids.  This keeps the surgical incisions clean, as well as preventing your clothes from spotting with blood or fluid.  Change band-aids or light dressing daily.     Shower / bathing: Keep the shoulder dry for 3 days after your surgery.  Then, you may shower. You may let soap and water run over skin incisions, but do not immerse your shoulder in water.  No swimming pools, hot tubs, or baths are recommended until at least 3 weeks after surgery.     ** If you have had a shoulder replacement, then please wait until your staples are removed at 7-14 days post-op before allowing your incision to get wet.       Ice: Apply an ice pack to your shoulder (15 minutes on the shoulder, 15 minutes off the shoulder), as you feel necessary to help with the pain and swelling.         Sling / Shoulder Immobilizer: The sling should be on at all times, except when bathing and doing your demonstrated exercises.     Activity: It is important to  move your shoulder, as well as your elbow, wrist, and hand several times daily, starting the day after surgery.  You may do pendulum exercises with your operative arm starting the day after surgery.  Pendulum (dangling [place]) exercises are encouraged 2-3 times daily.  The sling will need to be removed for pendulum exercises.     Pain medication: Take your pain medicine, as needed and prescribed.  Do not drive or operate machinery while taking narcotic pain medication.   You may start or resume anti-inflammatory medication (i.e. ibuprofen, naproxen) anytime after surgery, which should be taken with food to avoid stomach irritation.     Problems:     If you are having problems with your shoulder (unexpected pain, excessive bleeding or discharge from your incisions, fevers/chills) do not hesitate to call the office or visit the nearest emergency room for evaluation.     Follow-up:     Make sure that you have an appointment 7-14 days following surgery.  Your stitches will be removed, and your procedure/rehabilitation will be discussed at that time.   Physical therapy may be prescribed at that time.         Mei Palacios MD   [facility]   [phone]  DIET: To avoid nausea, slowly advance diet as tolerated, avoiding spicy or greasy foods for the first day.  Add more substantial food to your diet according to your physician's instructions.    INCREASE FLUIDS AND FIBER TO AVOID CONSTIPATION.      FOLLOW-UP APPOINTMENT:  A follow-up appointment should be arranged with your doctor in has been pre made     You should CALL YOUR PHYSICIAN if you develop:  Fever greater than 101 degrees F.  Pain not relieved by medication, or persistent nausea or vomiting.  Excessive bleeding (blood soaking through dressing) or unexpected drainage from the wound.  Extreme redness or swelling around the incision site, drainage of pus or foul smelling drainage.  Inability to urinate or empty your bladder within 8 hours.  Problems  with breathing or chest pain.    You should call 911 if you develop problems with breathing or chest pain.  If you are unable to contact your doctor or surgical center, you should go to the nearest emergency room or urgent care center.  Physician's telephone #: Dr Palacios [phone]    If any questions arise, call your doctor.  If your doctor is not available, please feel free to call the Surgical Center at {Surgical Dept Numbers:[id number]}.  The Center is open Monday through Friday from 7AM to 7PM.  You can also call the HEALTH HOTLINE open 24 hours/day, 7 days/week and speak to a nurse at [phone], or toll free at [phone].    A registered nurse may call you a few days after your surgery to see how you are doing after your procedure.    MEDICATIONS: Resume taking daily medication.  Take prescribed pain medication with food.  If no medication is prescribed, you may take non-aspirin pain medication if needed.  PAIN MEDICATION CAN BE VERY CONSTIPATING.  Take a stool softener or laxative such as senokot, pericolace, or milk of magnesia if needed.    Prescription given prior to surgery     If your physician has prescribed pain medication that includes Acetaminophen (Tylenol), do not take additional Acetaminophen (Tylenol) while taking the prescribed medication.    Depression / Suicide Risk    As you are discharged from this [facility] facility, it is important to learn how to keep safe from harming yourself.    Recognize the warning signs:  · Abrupt changes in personality, positive or negative- including increase in energy   · Giving away possessions  · Change in eating patterns- significant weight changes-  positive or negative  · Change in sleeping patterns- unable to sleep or sleeping all the time   · Unwillingness or inability to communicate  · Depression  · Unusual sadness, discouragement and loneliness  · Talk of wanting to die  · Neglect of personal appearance   · Rebelliousness- reckless  behavior  · Withdrawal from people/activities they love  · Confusion- inability to concentrate     If you or a loved one observes any of these behaviors or has concerns about self-harm, here's what you can do:  · Talk about it- your feelings and reasons for harming yourself  · Remove any means that you might use to hurt yourself (examples: pills, rope, extension cords, firearm)  · Get professional help from the community (Mental Health, Substance Abuse, psychological counseling)  · Do not be alone:Call your Safe Contact- someone whom you trust who will be there for you.  · Call your local CRISIS HOTLINE 379-2854 or 730-516-5015  · Call your local Children's Mobile Crisis Response Team [place] (940) 889-1915 or www.WUT  · Call the toll free National Suicide Prevention Hotlines   · National Suicide Prevention Lifeline 870-598-YBCA (2488)  [place] [employer] Line Network 800-SUICIDE (896-5586)    Peripheral Nerve Block Discharge Instructions from Same Day Surgery and Inpatient :    What to Expect - Upper Extremity  · You may experience numbness and weakness in {ARM LOCATION PNB:021747}  on the same side as your surgery  · This is normal. For some people, this may be an unpleasant sensation. Be very careful with your numb limb  · Ask for help when you need it  Shoulder Surgery Side Effects  · In addition to numbness and weakness you may experience other symptoms  · Other nerves that are close to those nerves injected can also be affected by local anesthesia  · You may experience a hoarseness in your voice  · Your breathing may feel different  · You may also notice drooping of your eyelid, pupil constriction, and decreased sweating, on the side of your surgery  · All of these side effects are normal and will resolve when the local anesthetic wears off   Prevent Injury  · Protect the limb like a baby  · Beware of exposing your limb to extreme heat or cold or trauma  · The limb may be injured without you  "noticing because it is numb  · Keep the limb elevated whenever possible  · Do not sleep on the limb  · Change the position of the limb regularly  · Avoid putting pressure on your surgical limb  Pain Control  · The initial block on the day of surgery will make your extremity feel \"numb\"  · Any consecutive injection including prior to discharge from the hospital will make your extremity feel \"numb\"  · You may feel an aching or burning when the local anesthesia starts to wear off  · Take pain pills as prescribed by your surgeon  · Call your surgeon or anesthesiologist if you do not have adequate pain control  ·   "

## 2020-01-10 NOTE — ANESTHESIA PROCEDURE NOTES
Peripheral Block  Date/Time: 1/10/2020 7:57 AM  Performed by: Mina Sue M.D.  Authorized by: Mina Sue M.D.     Patient Location:  Pre-op  Start Time:  1/10/2020 7:57 AM  End Time:  1/10/2020 7:59 AM  Reason for Block: at surgeon's request and post-op pain management    patient identified, IV checked, site marked, risks and benefits discussed, surgical consent, monitors and equipment checked, pre-op evaluation and timeout performed    Patient Position:  Supine  Prep: ChloraPrep    Monitoring:  Heart rate, continuous pulse ox and cardiac monitor  Block Region:  Upper Extremity  Upper Extremity - Block Type:  BRACHIAL PLEXUS block, Interscalene approach    Laterality:  Right  Procedures: ultrasound guided  Image captured, interpreted and electronically stored.  Strength:  1 %  Dose:  3 ml  Block Type:  Single-shot  Needle Length:  50mm  Needle Gauge:  22 G  Needle Localization:  Ultrasound guidance  Injection Assessment:  Negative aspiration for heme, no paresthesia on injection, incremental injection and local visualized surrounding nerve on ultrasound  Evidence of intravascular injection: No     US Guided Interscalene Brachial Plexus Block   US transducer placed on the neck in oblique plane approximately at the level of C6.  Anterior and Middle Scalene (MSM) muscles identified with nerve trunks identified between the muscles.  Needle inserted lateral to probe and advanced under direct visualization through the MSM into a perineural position.  After negative aspiration, LA injected with ease and visualized surrounding the nerve trunks.

## 2020-01-10 NOTE — OP REPORT
DATE OF SERVICE:  01/10/2020    PREOPERATIVE DIAGNOSES:  Right shoulder impingement syndrome, recurrent   rotator cuff tear, superior labrum anterior and posterior lesion with proximal   biceps tendonitis, mild glenohumeral joint osteoarthritis.    POSTOPERATIVE DIAGNOSES:  Right shoulder impingement syndrome, recurrent   rotator cuff tear, superior labrum anterior and posterior lesion with proximal   biceps tendonitis, mild glenohumeral joint osteoarthritis.    PROCEDURES:  Right shoulder arthroscopy, subacromial decompression, revision   rotator cuff repair, superior capsular reconstruction, labral debridement,   arthroscopic proximal biceps tenodesis.    SURGEON:  Mei Palacios MD    ASSISTANT:  Juice Harris CST FA    ANESTHESIOLOGIST:  Mina Sue MD    TYPE OF ANESTHESIA:  General, with preoperative interscalene nerve block.    INTRAVENOUS FLUID:  One liter crystalloid.    ESTIMATED BLOOD LOSS:  Minimal.    DRAINS:  None.    SPECIMENS:  None.    COMPLICATIONS:  None.    IMPLANTS:  Verito Iconix anchors x2, Omega anchors x2, ReelX anchors x2,   AlloSource AlloMend ADM graft.    REASON FOR PROCEDURE:  The patient is a 64-year-old female who underwent a   right shoulder arthroscopy with rotator cuff repair in 2008.  She did well for   several years.  Recently, however, she has experienced recurrent pain as well   as some weakness.  A new MRI demonstrated recurrent rotator cuff tearing and   a mild degree of osteoarthritis.  We decided to proceed with revision repair   with probable augmentation using a superior capsular reconstruction technique   using acellular dermal matrix.    DESCRIPTION OF OPERATION:  The patient was given a right interscalene nerve   block by the anesthesiologist before surgery.  Once back in the operating   room, a breathing tube was placed.  She was given 2 g of IV Ancef.  She was   then placed in the lateral decubitus position.  Care was taken to pad her   axilla as well as all  bony prominences.  The right shoulder was examined under   anesthesia.  She was noted to have good range of motion, without instability.    The right upper extremity was then prepped with ChloraPrep and draped in   standard sterile fashion.  It was then placed in the Arthrex traction device.    Bony landmarks were drawn and a standard posterior portal was established.    The arthroscope was then inserted.  There was some grade II change noted on   the humeral head.  An anterosuperior cannula was placed in the rotator   interval, just beneath the biceps tendon.  A smoothing chondroplasty was   performed.  There was just mild cartilage softening noted in the glenoid.    There was a mild degree of global labral fraying.  There was proximal biceps   tendinopathy and the biceps tendon was widely exposed through the superior   rotator cuff tear.  The subscapularis tendon was noted to be intact.  There   was no evidence of subcoracoid impingement.  Portals were switched and the   posterior labrum was debrided.  The arthroscope was then inserted into the   subacromial space.  There was a copious amount of thickened, inflamed bursal   tissue.  There was not much of an acromial curve; however, the subacromial   decompression had been previously performed was revised to create a bleeding   bony surface on the undersurface of the acromion.  This was done with a 5.5 mm   resector using a cutting block technique from posterior to anterior.  There   was some suture that was noted to remain on the 2 metal anchors that had been   previously placed.  The mechanism of recurrent tearing was the fact that the   tissue that had been previously repaired simply pulled through the suture.    This free suture was then cut and removed.  The biceps tendon was exposed   through the large recurrent rotator cuff tear.  A tenodesis was performed high   in the groove.  This was done by placing the larger cannula laterally.  Two   individual shabbir  tag sutures were then taken with 1.2 mm tape through the   proximal biceps.  They were then loaded into the islet of a ReelX anchor,   which was tapped, then expanded nicely compressing and tenodesing the long   head of the biceps tendon high in the groove.  This was done subsequent to   preparing and roughening the groove to create a healing response.  The biceps   tendon was then simply released off of the superior glenoid tubercle with the   2 cm intra-articular segment cut just proximal to the tenodesis site and   removed.  Next, the superior glenoid labrum was debrided.  The superior labrum   was essentially removed, taking the superior glenoid neck down to a bleeding   bony surface for healing response for the undersurface of the ADM.  With the   arthroscope in the lateral portal, an accessory anteromedial portal was   established.  A 2.3 Iconix anchor was drilled and tapped into place.  Through   the [place] portal coming in at approximately the 12 o'clock position, a   12-degree guide was used and a second Iconix anchor was drilled and tapped   into place at the posteromedial extent of the recurrent tear.  Next, the   arthroscope was placed posteriorly.  The entire exposed greater tuberosity was   then roughened with the shaver and resector.  The articular margin was   medialized slightly.  An accessory anterolateral portal was established.  An   Noble anchor was placed.  Another Noble anchor was placed posterolaterally.    These were both PEEK anchors.  They have both been loaded with 2 mm tape.    Next, using a pull through technique as well as the special measuring device,   the sides of this quadrilateral structure created by the 4 anchor points were   measured.  A Bullfrog cannula was then placed laterally.  On the back table,   the graft was opened, and rinsed with bacitracin saline fluid.  It was then   measured and cut to size.  Attention was then turned back to the shoulder.    All 4 sutures groups  were pulled out of the lateral Bullfrog cannula.  Using a   Tuohy needle and wire lasso, sutures were pulled through the graft.  Next,   using an anchor bridging technique, multiple half hitches were tied and the   medial aspect of the graft was then pulled down on the prepared superior neck   of the glenoid.  The tapes were then crossed and placed into an anterolateral,   then posterolateral ReelX anchor.  This allowed for excellent compression of   the graft onto the prepared and fenestrated greater tuberosity.  Good graft   tension was noted.  Two side-to-side sutures were taken with 1.2 mm tape.    They were sequentially tied down.  Another 1.2 mm tape was passed and tied   down anteriorly.  This allowed for complete humeral head coverage and   excellent graft tension and repair of the infraspinatus posteriorly to the   graft.  One liter of bacitracin saline fluid was flushed through the   subacromial space.  All instruments were then removed.  Portals were closed   with 3-0 nylon suture.  A sterile dressing was applied.  All drapes were   removed and the arm was carefully taken out of traction and placed into a   shoulder abduction sling.  The patient was placed supine on a stretcher and   taken to recovery room in stable condition.       ____________________________________     MD ANNETTE ASCENCIO / FREDI    DD:  01/10/2020 10:02:15  DT:  01/10/2020 10:42:10    D#:  [id number]  Job#:  [id number]

## 2020-01-10 NOTE — ANESTHESIA PREPROCEDURE EVALUATION
Relevant Problems   GI   (+) Acid reflux disease      ENDO   (+) Type II diabetes mellitus (HCC)     Vitals:    01/10/20 0646   BP: 114/68   Pulse: 78   Resp: 16   Temp: 36.1 °C (97 °F)   SpO2: 96%     No results for input(s): WBC, RBC, HEMOGLOBIN, HEMATOCRIT, MCV, MCH, RDW, PLATELETCT, MPV, NEUTSPOLYS, LYMPHOCYTES, MONOCYTES, EOSINOPHILS, BASOPHILS, RBCMORPHOLO in the last 72 hours.    Physical Exam    Airway   Mallampati: II  TM distance: >3 FB  Neck ROM: full       Cardiovascular - normal exam  Rhythm: regular  Rate: normal  (-) murmur     Dental - normal exam         Pulmonary - normal exam  Breath sounds clear to auscultation     Abdominal    Neurological - normal exam                 Anesthesia Plan    ASA 3       Plan - general and peripheral nerve block     Peripheral nerve block will be post-op pain control  Airway plan will be LMA        Induction: intravenous    Postoperative Plan: Postoperative administration of opioids is intended.    Pertinent diagnostic labs and testing reviewed    Informed Consent:    Anesthetic plan and risks discussed with patient.    Use of blood products discussed with: patient whom consented to blood products.

## 2020-01-10 NOTE — OR NURSING
1000 Arrived into pacu via gurney , head of gurney elevated 30 degrees, pt arouses to verbal stim, denies pain or nausea, states cold. Covered with 2 warm blankets, ice to rt shoulder, immobilizer in place, able to move fingers but states can't hardly feel them . Cap refill q 3 sec return in fingers equal bilaterally, radial pulses equal bilaterally. Dressing c/d/i. Pt positioned for comfort.   1015 no change, blood sugar 143 per pts own wireless monitor, left upper abdominal  area with pts infusion pump, site intact.   1030 tolerating water without incident. Chap stick applied per pt request comfort. Weaning down o2 without incident.   1045 resting comfortably   1100 arouses easily to verbal stim, states ready to go to stage 2. No change...

## 2020-01-10 NOTE — ANESTHESIA PROCEDURE NOTES
Airway  Date/Time: 1/10/2020 8:10 AM  Performed by: Mina Sue M.D.  Authorized by: Mina Sue M.D.     Location:  OR  Urgency:  Elective  Difficult Airway: No    Indications for Airway Management:  Anesthesia  Spontaneous Ventilation: absent    Sedation Level:  Deep  Preoxygenated: Yes    Mask Difficulty Assessment:  0 - not attempted  Final Airway Type:  Supraglottic airway  Final Supraglottic Airway:  Standard LMA  SGA Size:  3  Number of Attempts at Approach:  1

## 2020-09-08 ENCOUNTER — HOSPITAL ENCOUNTER (OUTPATIENT)
Dept: LAB | Facility: MEDICAL CENTER | Age: 65
End: 2020-09-08
Payer: MEDICARE

## 2020-09-08 LAB
BASOPHILS # BLD AUTO: 0.5 % (ref 0–1.8)
BASOPHILS # BLD: 0.03 K/UL (ref 0–0.12)
CRP SERPL HS-MCNC: 1.16 MG/DL (ref 0–0.75)
EOSINOPHIL # BLD AUTO: 0.14 K/UL (ref 0–0.51)
EOSINOPHIL NFR BLD: 2.5 % (ref 0–6.9)
ERYTHROCYTE [DISTWIDTH] IN BLOOD BY AUTOMATED COUNT: 43.6 FL (ref 35.9–50)
HCT VFR BLD AUTO: 43.1 % (ref 37–47)
HGB BLD-MCNC: 14.6 G/DL (ref 12–16)
IMM GRANULOCYTES # BLD AUTO: 0.02 K/UL (ref 0–0.11)
IMM GRANULOCYTES NFR BLD AUTO: 0.4 % (ref 0–0.9)
LYMPHOCYTES # BLD AUTO: 1.78 K/UL (ref 1–4.8)
LYMPHOCYTES NFR BLD: 31.2 % (ref 22–41)
MCH RBC QN AUTO: 30.8 PG (ref 27–33)
MCHC RBC AUTO-ENTMCNC: 33.9 G/DL (ref 33.6–35)
MCV RBC AUTO: 90.9 FL (ref 81.4–97.8)
MONOCYTES # BLD AUTO: 0.38 K/UL (ref 0–0.85)
MONOCYTES NFR BLD AUTO: 6.7 % (ref 0–13.4)
NEUTROPHILS # BLD AUTO: 3.35 K/UL (ref 2–7.15)
NEUTROPHILS NFR BLD: 58.7 % (ref 44–72)
NRBC # BLD AUTO: 0 K/UL
NRBC BLD-RTO: 0 /100 WBC
PLATELET # BLD AUTO: 187 K/UL (ref 164–446)
PMV BLD AUTO: 10.5 FL (ref 9–12.9)
RBC # BLD AUTO: 4.74 M/UL (ref 4.2–5.4)
RHEUMATOID FACT SER IA-ACNC: <10 IU/ML (ref 0–14)
URATE SERPL-MCNC: 1.7 MG/DL (ref 1.9–8.2)
WBC # BLD AUTO: 5.7 K/UL (ref 4.8–10.8)

## 2020-09-08 PROCEDURE — 85025 COMPLETE CBC W/AUTO DIFF WBC: CPT

## 2020-09-08 PROCEDURE — 85652 RBC SED RATE AUTOMATED: CPT

## 2020-09-08 PROCEDURE — 86140 C-REACTIVE PROTEIN: CPT

## 2020-09-08 PROCEDURE — 86200 CCP ANTIBODY: CPT

## 2020-09-08 PROCEDURE — 84550 ASSAY OF BLOOD/URIC ACID: CPT

## 2020-09-08 PROCEDURE — 86812 HLA TYPING A B OR C: CPT

## 2020-09-08 PROCEDURE — 86431 RHEUMATOID FACTOR QUANT: CPT

## 2020-09-08 PROCEDURE — 86039 ANTINUCLEAR ANTIBODIES (ANA): CPT

## 2020-09-08 PROCEDURE — 86038 ANTINUCLEAR ANTIBODIES: CPT

## 2020-09-08 PROCEDURE — 36415 COLL VENOUS BLD VENIPUNCTURE: CPT

## 2020-09-09 LAB — ERYTHROCYTE [SEDIMENTATION RATE] IN BLOOD BY WESTERGREN METHOD: 7 MM/HOUR (ref 0–30)

## 2020-09-10 LAB
CCP IGG SERPL-ACNC: 3 UNITS (ref 0–19)
HLA-B27 QL FC: NEGATIVE
NUCLEAR IGG SER QL IA: DETECTED

## 2020-09-11 LAB
ANA INTERPRETIVE COMMENT Q5143: NORMAL
ANTINUCLEAR ANTIBODY (ANA), HEP-2, IGG Q5142: NORMAL

## 2020-12-09 ENCOUNTER — HOSPITAL ENCOUNTER (OUTPATIENT)
Dept: LAB | Facility: MEDICAL CENTER | Age: 65
End: 2020-12-09
Attending: INTERNAL MEDICINE
Payer: MEDICARE

## 2020-12-09 LAB
ALBUMIN SERPL BCP-MCNC: 3.8 G/DL (ref 3.2–4.9)
ALBUMIN/GLOB SERPL: 1.4 G/DL
ALP SERPL-CCNC: 213 U/L (ref 30–99)
ALT SERPL-CCNC: 60 U/L (ref 2–50)
ANION GAP SERPL CALC-SCNC: 9 MMOL/L (ref 7–16)
AST SERPL-CCNC: 53 U/L (ref 12–45)
BILIRUB SERPL-MCNC: 0.5 MG/DL (ref 0.1–1.5)
BUN SERPL-MCNC: 11 MG/DL (ref 8–22)
CALCIUM SERPL-MCNC: 9 MG/DL (ref 8.5–10.5)
CHLORIDE SERPL-SCNC: 106 MMOL/L (ref 96–112)
CO2 SERPL-SCNC: 24 MMOL/L (ref 20–33)
CREAT SERPL-MCNC: 0.51 MG/DL (ref 0.5–1.4)
GLOBULIN SER CALC-MCNC: 2.8 G/DL (ref 1.9–3.5)
GLUCOSE SERPL-MCNC: 110 MG/DL (ref 65–99)
POTASSIUM SERPL-SCNC: 3.7 MMOL/L (ref 3.6–5.5)
PROT SERPL-MCNC: 6.6 G/DL (ref 6–8.2)
SODIUM SERPL-SCNC: 139 MMOL/L (ref 135–145)

## 2020-12-09 PROCEDURE — 36415 COLL VENOUS BLD VENIPUNCTURE: CPT

## 2020-12-09 PROCEDURE — 80053 COMPREHEN METABOLIC PANEL: CPT

## 2021-04-27 ENCOUNTER — PATIENT MESSAGE (OUTPATIENT)
Dept: HEALTH INFORMATION MANAGEMENT | Facility: OTHER | Age: 66
End: 2021-04-27

## 2021-04-28 ENCOUNTER — HOSPITAL ENCOUNTER (OUTPATIENT)
Dept: RADIOLOGY | Facility: MEDICAL CENTER | Age: 66
End: 2021-04-28
Attending: FAMILY MEDICINE | Admitting: FAMILY MEDICINE
Payer: MEDICARE

## 2021-04-28 DIAGNOSIS — R42 DIZZINESS: ICD-10-CM

## 2021-04-28 PROCEDURE — 700117 HCHG RX CONTRAST REV CODE 255: Performed by: FAMILY MEDICINE

## 2021-04-28 PROCEDURE — A9576 INJ PROHANCE MULTIPACK: HCPCS | Performed by: FAMILY MEDICINE

## 2021-04-28 PROCEDURE — 70553 MRI BRAIN STEM W/O & W/DYE: CPT | Mod: MH

## 2021-04-28 RX ADMIN — GADOTERIDOL 15 ML: 279.3 INJECTION, SOLUTION INTRAVENOUS at 10:28

## 2021-05-27 ENCOUNTER — PATIENT OUTREACH (OUTPATIENT)
Dept: HEALTH INFORMATION MANAGEMENT | Facility: OTHER | Age: 66
End: 2021-05-27

## 2021-05-27 NOTE — PROGRESS NOTES
Outcome: scheduled   Comprehensive Geriatric Assessment    Friday, May 28, 2021  11:00 am (1 hour)    [facility] [address]    Please transfer to Patient Outreach Team at [phone] when patient returns call.      Attempt #1

## 2021-05-28 PROBLEM — J45.909 ASTHMA: Status: ACTIVE | Noted: 2021-05-28

## 2021-05-28 PROBLEM — R29.6 RECURRENT FALLS: Status: ACTIVE | Noted: 2021-05-28

## 2021-05-28 PROBLEM — I65.23 BILATERAL CAROTID ARTERY STENOSIS: Status: ACTIVE | Noted: 2021-05-28

## 2021-05-28 PROBLEM — Z85.07 HISTORY OF PANCREATIC CANCER: Status: ACTIVE | Noted: 2021-05-28

## 2021-05-28 PROBLEM — F32.5 MAJOR DEPRESSIVE DISORDER IN REMISSION (HCC): Status: ACTIVE | Noted: 2021-05-28

## 2021-05-28 PROBLEM — H35.9: Status: ACTIVE | Noted: 2021-05-28

## 2021-05-28 PROBLEM — E78.5 HYPERLIPIDEMIA: Status: ACTIVE | Noted: 2021-05-28

## 2021-05-28 PROBLEM — E10.40 DIABETIC NEUROPATHY ASSOCIATED WITH TYPE 1 DIABETES MELLITUS (HCC): Status: ACTIVE | Noted: 2021-05-28

## 2021-05-28 PROBLEM — E10.9 TYPE 1 DIABETES MELLITUS (HCC): Status: ACTIVE | Noted: 2021-05-28

## 2021-07-16 ENCOUNTER — HOSPITAL ENCOUNTER (OUTPATIENT)
Dept: RADIOLOGY | Facility: MEDICAL CENTER | Age: 66
End: 2021-07-16
Attending: FAMILY MEDICINE
Payer: MEDICARE

## 2021-07-16 DIAGNOSIS — Z12.31 ENCOUNTER FOR SCREENING MAMMOGRAM FOR BREAST CANCER: ICD-10-CM

## 2021-07-16 DIAGNOSIS — M81.0 SENILE OSTEOPOROSIS: ICD-10-CM

## 2021-07-16 PROCEDURE — 77080 DXA BONE DENSITY AXIAL: CPT

## 2021-07-16 PROCEDURE — 77063 BREAST TOMOSYNTHESIS BI: CPT

## 2021-07-22 ENCOUNTER — OFFICE VISIT (OUTPATIENT)
Dept: NEUROLOGY | Facility: MEDICAL CENTER | Age: 66
End: 2021-07-22
Attending: PSYCHIATRY & NEUROLOGY
Payer: MEDICARE

## 2021-07-22 VITALS
BODY MASS INDEX: 29.14 KG/M2 | HEART RATE: 89 BPM | TEMPERATURE: 96.9 F | HEIGHT: 61 IN | SYSTOLIC BLOOD PRESSURE: 124 MMHG | OXYGEN SATURATION: 99 % | DIASTOLIC BLOOD PRESSURE: 76 MMHG | WEIGHT: 154.32 LBS

## 2021-07-22 DIAGNOSIS — R29.6 RECURRENT FALLS: ICD-10-CM

## 2021-07-22 PROBLEM — E11.9 DIABETES MELLITUS (HCC): Status: ACTIVE | Noted: 2020-09-03

## 2021-07-22 PROCEDURE — 99211 OFF/OP EST MAY X REQ PHY/QHP: CPT | Performed by: PSYCHIATRY & NEUROLOGY

## 2021-07-22 PROCEDURE — 99204 OFFICE O/P NEW MOD 45 MIN: CPT | Performed by: PSYCHIATRY & NEUROLOGY

## 2021-07-22 ASSESSMENT — FIBROSIS 4 INDEX: FIB4 SCORE: 2.38

## 2021-07-22 ASSESSMENT — PATIENT HEALTH QUESTIONNAIRE - PHQ9: CLINICAL INTERPRETATION OF PHQ2 SCORE: 0

## 2021-07-22 NOTE — LETTER
Sealed  Brigitte Salmon D.O.  [address]  [place] [address]  Fax: [phone]   Authorization for Release/Disclosure of   Protected Health Information   Name: THONY GAINES : 1955 SSN: [id number]   Address: [facility] [address]  [facility] [address] Phone:    [phone] (home)    I authorize the entity listed below to release/disclose the PHI below to:   Sealed/Brigitte Salmon D.O. and González Collins M.D.   Provider or Entity Name:  {[facility] SCREEN[facility] LOCATIONS:[id number]}   Reason for request: continuity of care   Information to be released:    [ X ] LAST COLONOSCOPY,  including any PATH REPORT and follow-up  [ X ] LAST FIT/COLOGUARD RESULT [  ] LAST DEXA  [  ] LAST MAMMOGRAM  [  ] LAST PAP  [  ] LAST LABS [  ] RETINA EXAM REPORT  [  ] IMMUNIZATION RECORDS  [  ] Release all info      [  ] Check here and initial the line next to each item to release ALL health information INCLUDING  _____ Care and treatment for drug and / or alcohol abuse  _____ HIV testing, infection status, or AIDS  _____ Genetic Testing    DATES OF SERVICE OR TIME PERIOD TO BE DISCLOSED: _____________  I understand and acknowledge that:  * This Authorization may be revoked at any time by you in writing, except if your health information has already been used or disclosed.  * Your health information that will be used or disclosed as a result of you signing this authorization could be re-disclosed by the recipient. If this occurs, your re-disclosed health information may no longer be protected by State or Federal laws.  * You may refuse to sign this Authorization. Your refusal will not affect your ability to obtain treatment.  * This Authorization becomes effective upon signing and will  on (date) __________.      If no date is indicated, this Authorization will  one (1) year from the signature date.    Name: Thony Gaines    Signature:   Date:     2021       PLEASE FAX REQUESTED  RECORDS BACK TO: [phone]

## 2021-07-22 NOTE — PROGRESS NOTES
"[facility] NEUROLOGY  GENERAL NEUROLOGY  NEW PATIENT VISIT    Referral source: none    CC: \"dizziness and giddiness\"    HISTORY OF ILLNESS:  Denise Gaines is a 65 y.o. woman with a history most notable for HLD, pancreatic cancer (s/p Whipple procedure, not treated with chemotherapy, in remission x7 years), DM, diabetic neuropathy, and depression.  Today, she was unaccompanied, and she provided the following history:    2013:  Denise was diagnosed with pancreatic cancer.  She was treated with a Whipple procedure but no chemotherapy.    2014:  Denise began experiencing falls.  One day she turned around, and the next thing she knew she was on the ground.  She didn't lose awareness.  There was no preceding tachycardia, lightheadedness, vertigo, or \"spinning.\"    2016:  Denise fell on the sidewalk and hyper-extended the right knee.  She required a walker for a while.    2018:  On Mother's day Denise went for a hike with her family.  All of a sudden she \"stepped wrong,\" and she \"went down.\"  She struck her forehead.    2019:  Denise noticed that when she rises from a lying or sitting position she feels a \"spinning.\"  Symptoms can also arise when she is lying or sitting still.  The spinning sensation would subside if she remained completely still.  Rolling over in bed can also provoke dizziness.    2020:  Denise tripped in a parking lot and struck her face.    She tries to walk slowly and be aware of her surroundings.  She avoids driving after dark.  She has difficulty walking in poorly-lit environments.    Denise's hearing is poor.    MEDICAL AND SURGICAL HISTORY:  Past Medical History:   Diagnosis Date   • Acid reflux    • Anesthesia     PONV/ slow to emerge per pt   • Arthritis 4/7/17    Bilateral shoulders and both hands   • ASTHMA     allergy induced-uses inhalers prn/flonase   • Blood clotting disorder (HCC) dx 10/2016    Right eye retina-Treated with laser and injections   • Bowel habit changes     Diarrhea chronic/ " fatty stools at times   • Bronchitis 12/2017   • Cancer (HCC) 12/2013-dx    pancreatic CA. Whipple procedure 1/2014   • Carotid artery plaque 2016    per ultrasound   • Cataract     Bilateral-mild   • Colitis since age 14   • Diabetes (HCC) 2014    post whipple procedure/ uses humalog   • Heart burn     treated with nexium   • Heart murmur     since birth   • High cholesterol    • Indigestion     takes creon digestive enzymes   • MRSA (methicillin resistant Staphylococcus aureus) 11/2016    Left hand-cleared    • MRSA (methicillin resistant Staphylococcus aureus) 1/30/2017    Back of left thigh-cleared.   • Pneumonia 2012    gets a pneumovax   • PONV (postoperative nausea and vomiting)    • Psychiatric problem     depression/ anxiety    • Shoulder pain 4/7/17    Chronic to right shoulder   • Unspecified urinary incontinence     uses pad   • Urinary bladder disorder     incontinence     Past Surgical History:   Procedure Laterality Date   • PB SHLDR ARTHROSCOP,PART ACROMIOPLAS Right 1/10/2020    Procedure: DECOMPRESSION, SHOULDER, ARTHROSCOPIC - SUBACROMIAL W/LABRAL DEBRIDEMENT, SUPERIOR CAPSULAR RECONSTRUCTION, VALLADARES;  Surgeon: Mei Palacios M.D.;  Location: [facility];  Service: Orthopedics   • BICEPS TENODESIS Right 1/10/2020    Procedure: TENODESIS, BICEPS;  Surgeon: Mei Palacios M.D.;  Location: [facility];  Service: Orthopedics   • ROTATOR CUFF REPAIR Right 1/10/2020    Procedure: REPAIR, ROTATOR CUFF;  Surgeon: Mei Palacios M.D.;  Location: [facility];  Service: Orthopedics   • GASTROSCOPY  3/15/2018    Procedure: GASTROSCOPY - POSS BIOPSY, DILATION, POLYPECTOMY, CONTROL OF HEMORRHAGE;  Surgeon: Edward Rubin M.D.;  Location: [facility];  Service: EUS   • EGD W/ENDOSCOPIC ULTRASOUND  3/15/2018    Procedure: EGD W/ENDOSCOPIC ULTRASOUND;  Surgeon: Edward Rubin M.D.;  Location: [facility];  Service: EUS   • EGD WITH ASP/BX   3/15/2018    Procedure: EGD WITH ASP/BX;  Surgeon: Edward Rubin M.D.;  Location: [facility];  Service: EUS   • GASTROSCOPY N/A 4/13/2017    Procedure: GASTROSCOPY - W/POSS BIOPSY, DILATION, POLYPECTOMY, CONTROL OF HEMORRHAGE;  Surgeon: Edward Rubin M.D.;  Location: [facility];  Service:    • EGD W/ENDOSCOPIC ULTRASOUND N/A 4/13/2017    Procedure: EGD W/ENDOSCOPIC ULTRASOUND;  Surgeon: Edward Rubin M.D.;  Location: [facility];  Service:    • COLONOSCOPY  2016    x 2   • ERCP-DIAGNOSTIC W/BIOPSY  8/2015    several to verify no return of pancreatic CA    • HERNIA REPAIR  7/2014    5 umbilical repaired at same time   • LAPAROSCOPY  1/27/2014    Performed by Shahbaz [facility] M.D. at [facility]   • WHIPPLE PROCEDURE  1/27/2014    Performed by Shahbaz Jang M.D. at [facility]   • NODE DISSECTION  1/27/2014    Performed by Shahbaz Jang M.D. at [facility]   • TONSILLECTOMY  2011   • OTHER ORTHOPEDIC SURGERY Left 2009    Nerve damage S/P lumbar surgery-foot broken and pinned.    • LUMBAR LAMINECTOMY DISKECTOMY  2008    L5   • SHOULDER ARTHROSCOPY W/ ROTATOR CUFF REPAIR Right 2006   • CARPAL TUNNEL RELEASE Right 2005   • BLADDER SUSPENSION  2003   • HYSTERECTOMY, TOTAL ABDOMINAL  1990     MEDICATIONS:  Current Outpatient Medications   Medication Sig   • albuterol (ACCUNEB) 1.25 MG/3ML nebulizer solution Inhale 1.25 mg by mouth 1 time daily as needed.   • albuterol (VENTOLIN HFA) 108 (90 Base) MCG/ACT Aero Soln inhalation aerosol Inhale 90 mcg by mouth Once PRN.   • fluticasone (FLONASE) 50 MCG/ACT nasal spray Spray 1 Spray in nose 2 times a day.   • HUMALOG 100 UNIT/ML Inject 30 Units as instructed every day.   • INSULIN LISP PROT & LISP, HUM, (HUMALOG MIX 50/50) (50-50) 100 UNIT/ML Suspension Humalog Mix 50-50 (U-100) Insulin 100 unit/mL subcutaneous suspension   • atorvastatin (LIPITOR) 10 MG Tab Take 10  mg by mouth every evening.   • esomeprazole (NEXIUM) 40 MG delayed-release capsule Take 40 mg by mouth every evening.   • sertraline (ZOLOFT) 50 MG Tab Take 50 mg by mouth every day.   • Esomeprazole Magnesium (NEXIUM) 20 MG Pack Take 20 mg by mouth as needed. For breathrough indigestion in am   • B Complex Vitamins (B COMPLEX 1 PO) Take 1 Tab by mouth every day.   • glucosamine Sulfate 500 MG Cap Take 1,500 mg by mouth every day.   • Methylsulfonylmethane (MSM) 1000 MG Tab Take 1,000 mg by mouth every day.   • ibuprofen (MOTRIN) 400 MG Tab Take 800 mg by mouth every 8 hours.   • Ergocalciferol (VITAMIN D2) 400 units Tab Take 800 Units by mouth every evening.   • Probiotic Product (PROBIOTIC ADVANCED PO) Take 1 Cap by mouth every evening.   • Multiple Vitamins-Minerals (MULTIVITAMIN ADULT PO) Take 1 Tab by mouth every day.   • Pseudoephedrine-Guaifenesin (MUCINEX D PO) Take 1 Tab by mouth as needed.   • Pancrelipase, Lip-Prot-Amyl, (CREON) 02064 UNITS Cap DR Particles Take 2 Tabs by mouth 3 times a day, with meals.   • sertraline (ZOLOFT) 100 MG TABS Take 150 mg by mouth every morning.   • CHONDROITIN SULFATE A PO Take 1 Cap by mouth every day. (Patient not taking: Reported on 7/22/2021)     SOCIAL HISTORY:  Social History     Tobacco Use   • Smoking status: Never Smoker   • Smokeless tobacco: Never Used   Substance Use Topics   • Alcohol use: Not Currently     Comment: one drink once every few months     Social History     Social History Narrative   • Not on file     FAMILY HISTORY:  No family history on file.  REVIEW OF SYSTEMS:  A ROS was completed.  Pertinent positives and negatives were included in the HPI, above.  All other systems were reviewed and are negative.    PHYSICAL EXAM:  General/Medical:  - NAD  - hair, skin, nails, and joints were normal    Neuro:  MENTAL STATUS: awake and alert; no deficits of speech or language; oriented to person, place, and time; affect was appropriate to situation; pleasant,  "cooperative    CRANIAL NERVES:    II: acuity: J7/J1, fields: intact to confrontation, pupils: 3/3 to 2/2 without a relative afferent pupillary defect, discs: NT    III/IV/VI: versions: intact without nystagmus    V: facial sensation: symmetric to light touch    VII: facial expression: symmetric    VIII: hearing: intact to finger rub    IX/X: palate: elevates symmetrically    XI: shoulder shrug: symmetric    XII: tongue: midline    MOTOR:  - bulk: normal throughout  - tone: normal throughout  Upper Extremity Strength  (R/L)    5/5   Elbow flexion 5/5   Elbow extension 5/5   Shoulder abduction 5/5     Lower Extremity Strength  (R/L)   Hip flexion 5/5   Knee extension 5/5   Knee flexion 5/5   Ankle plantarflexion 5/5   Ankle dorsiflexion 5/5     - pronator drift: absent  - abnormal movements: none    SENSATION:  - light touch: intact over the upper and lower extremities  - vibration (R/L, seconds): 7/6 at the great toes  - pinprick: NT  - proprioception: intact at the great toes  - Romberg: present    COORDINATION:  - finger to nose: normal, no ataxia on exam  - finger tapping: rapid and accurate, bilaterally    REFLEXES:  Reflex Right Left   BR 2+ 2+   Biceps 2+ 2+   Triceps 2+ 2+   Patellae 2+ 2+   Achilles 1+ 1+   Toes down down     GAIT:  - narrow base and normal    REVIEW OF IMAGING STUDIES: I reviewed the following studies:  MRI Brain:  Date: 4/28/2021  W/o and w/ contrast?: yes  Indication: \"dizziness and multiple falls\"  Comparison: 8/3/2016  Impression:  \"1) Minimal chronic microvascular ischemic type changes.  2) No acute intracranial abnormality or pathologic enhancement.  3) Right maxillary sinusitis.\"    REVIEW OF LABORATORY STUDIES:  No recent data available for review.    ASSESSMENT:  Denise Gaines is a 65 y.o. 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  Denise's falls are likely related to mild sensory ataxia in the lower extremities.  She has multiple reasons for numbness in the feet, including diabetes and foot surgery.  I explained this to Denise at length.  She will take extra caution while walking.  I encouraged her to use a cane and to ensure adequate lighting when walking.    PLAN:  Sensory Ataxia and Falls:  - no additional workup at this time  - continue to strive for tight blood glucose control  - use an assistive device (e.g., cane)  - ensure adequate lighting while walking    Follow-Up:  - Return if symptoms worsen or fail to improve.    Signed: González Collins M.D.    BILLING DOCUMENTATION:   I spent 49 minutes reviewing the medical record, interviewing and examining the patient, discussing my impression (see \"assessment\" above), and coordinating care.  "

## 2021-07-29 ENCOUNTER — TELEMEDICINE (OUTPATIENT)
Dept: ADMISSIONS | Facility: MEDICAL CENTER | Age: 66
End: 2021-07-29
Attending: ORTHOPAEDIC SURGERY
Payer: MEDICARE

## 2021-07-29 NOTE — PREPROCEDURE INSTRUCTIONS
Pre admit apt: Pt. Instructed to continue regularly prescribed medications through day before surgery.  Instructed to take the following medications, the day of surgery, with a sip of water per anesthesia protocol:albuterol nebulizer and albuterol inh, nexium, flonase mucinex,     Pt has insulin pump, she will get instructions per her endocrinologist for insulin instructions DOS.    Covid vaccinated, pt instructed to avoid large crowds and to wear mask in public, and to report any new sxs of illness or covid to surgeon.

## 2021-08-04 ENCOUNTER — HOSPITAL ENCOUNTER (OUTPATIENT)
Facility: MEDICAL CENTER | Age: 66
End: 2021-08-04
Attending: ORTHOPAEDIC SURGERY | Admitting: ORTHOPAEDIC SURGERY
Payer: MEDICARE

## 2021-08-04 VITALS
TEMPERATURE: 97 F | HEIGHT: 61 IN | SYSTOLIC BLOOD PRESSURE: 131 MMHG | BODY MASS INDEX: 29.14 KG/M2 | WEIGHT: 154.32 LBS | RESPIRATION RATE: 16 BRPM | OXYGEN SATURATION: 97 % | DIASTOLIC BLOOD PRESSURE: 61 MMHG | HEART RATE: 73 BPM

## 2021-08-04 DIAGNOSIS — M65.311 TRIGGER THUMB OF RIGHT HAND: ICD-10-CM

## 2021-08-04 DIAGNOSIS — M65.312 TRIGGER THUMB OF LEFT HAND: ICD-10-CM

## 2021-08-04 PROBLEM — M65.4 RADIAL STYLOID TENOSYNOVITIS: Status: ACTIVE | Noted: 2021-08-04

## 2021-08-04 LAB — GLUCOSE BLD-MCNC: 67 MG/DL (ref 65–99)

## 2021-08-04 PROCEDURE — 502576 HCHG PACK, HAND: Performed by: ORTHOPAEDIC SURGERY

## 2021-08-04 PROCEDURE — A9270 NON-COVERED ITEM OR SERVICE: HCPCS | Performed by: ANESTHESIOLOGY

## 2021-08-04 PROCEDURE — 160048 HCHG OR STATISTICAL LEVEL 1-5: Performed by: ORTHOPAEDIC SURGERY

## 2021-08-04 PROCEDURE — 160046 HCHG PACU - 1ST 60 MINS PHASE II: Performed by: ORTHOPAEDIC SURGERY

## 2021-08-04 PROCEDURE — 500881 HCHG PACK, EXTREMITY: Performed by: ORTHOPAEDIC SURGERY

## 2021-08-04 PROCEDURE — 700102 HCHG RX REV CODE 250 W/ 637 OVERRIDE(OP): Performed by: ANESTHESIOLOGY

## 2021-08-04 PROCEDURE — 160028 HCHG SURGERY MINUTES - 1ST 30 MINS LEVEL 3: Performed by: ORTHOPAEDIC SURGERY

## 2021-08-04 PROCEDURE — 700101 HCHG RX REV CODE 250: Performed by: ORTHOPAEDIC SURGERY

## 2021-08-04 PROCEDURE — 82962 GLUCOSE BLOOD TEST: CPT

## 2021-08-04 PROCEDURE — 160025 RECOVERY II MINUTES (STATS): Performed by: ORTHOPAEDIC SURGERY

## 2021-08-04 PROCEDURE — 501838 HCHG SUTURE GENERAL: Performed by: ORTHOPAEDIC SURGERY

## 2021-08-04 PROCEDURE — 700101 HCHG RX REV CODE 250: Performed by: ANESTHESIOLOGY

## 2021-08-04 PROCEDURE — 160021 HCHG LOCAL: Performed by: ORTHOPAEDIC SURGERY

## 2021-08-04 RX ORDER — CELECOXIB 200 MG/1
200 CAPSULE ORAL ONCE
Status: COMPLETED | OUTPATIENT
Start: 2021-08-04 | End: 2021-08-04

## 2021-08-04 RX ORDER — SODIUM CHLORIDE, SODIUM LACTATE, POTASSIUM CHLORIDE, CALCIUM CHLORIDE 600; 310; 30; 20 MG/100ML; MG/100ML; MG/100ML; MG/100ML
INJECTION, SOLUTION INTRAVENOUS CONTINUOUS
Status: DISCONTINUED | OUTPATIENT
Start: 2021-08-04 | End: 2021-08-04 | Stop reason: HOSPADM

## 2021-08-04 RX ORDER — BUPIVACAINE HYDROCHLORIDE AND EPINEPHRINE 5; 5 MG/ML; UG/ML
INJECTION, SOLUTION EPIDURAL; INTRACAUDAL; PERINEURAL
Status: DISCONTINUED | OUTPATIENT
Start: 2021-08-04 | End: 2021-08-04 | Stop reason: HOSPADM

## 2021-08-04 RX ORDER — ACETAMINOPHEN 500 MG
1000 TABLET ORAL ONCE
Status: COMPLETED | OUTPATIENT
Start: 2021-08-04 | End: 2021-08-04

## 2021-08-04 RX ORDER — GABAPENTIN 300 MG/1
300 CAPSULE ORAL ONCE
Status: COMPLETED | OUTPATIENT
Start: 2021-08-04 | End: 2021-08-04

## 2021-08-04 RX ORDER — DEXTROSE MONOHYDRATE 25 G/50ML
50 INJECTION, SOLUTION INTRAVENOUS
Status: COMPLETED | OUTPATIENT
Start: 2021-08-04 | End: 2021-08-04

## 2021-08-04 RX ADMIN — ACETAMINOPHEN 1000 MG: 500 TABLET, FILM COATED ORAL at 14:56

## 2021-08-04 RX ADMIN — CELECOXIB 200 MG: 200 CAPSULE ORAL at 14:56

## 2021-08-04 RX ADMIN — DEXTROSE MONOHYDRATE 50 ML: 25 INJECTION, SOLUTION INTRAVENOUS at 14:56

## 2021-08-04 RX ADMIN — GABAPENTIN 300 MG: 300 CAPSULE ORAL at 14:56

## 2021-08-04 ASSESSMENT — FIBROSIS 4 INDEX: FIB4 SCORE: 2.38

## 2021-08-04 NOTE — OP REPORT
Date of surgery: 8/4/2021    Diagnosis: Right trigger thumb    Postoperative diagnosis: Right thumb trigger finger    Surgery performed: Right thumb A1 pulley release.    Surgeon: Drew Duong M.D.    Anesthesia: Local    Tourniquet time: 5 minutes    Tourniquet pressure: 250 mmHg    Complications: None    Indications for procedure: The patient has had persistent right thumb trigger finger and has tried rest, activity modifications, NSAIDs and steroid injections. All of these have failed to resolve the symptoms. For these reasons the decision was made to take them to the operating room today for the above-mentioned procedure.    Description of procedure: On the day of surgery the patient was seen in the preoperative area where informed consent was obtained with all risks and benefits of the procedure explained and all questions answered. The patient wished to proceed with surgery. The proper site was marked. They were subsequently taken to the operating room and placed in the supine position with all bony prominences well-padded. Local anesthesia was induced with equal parts of 0.5% bupivacaine with epinephrine and 1% lidocaine with epinephrine. A tourniquet was placed on the operative extremity and it was then prepped and draped in the usual sterile fashion.    A timeout was performed without persons in attendance agreed on the proper patient, proper surgical site and proper surgery to be performed.    An Esmarch was used to exsanguinate the right upper extremity and the tourniquet was insufflated to 250 mmHg. A longitudinal incision was made directly over the A1 pulley of the thumb. Sharp and blunt dissection was taken down to the level of the A1 pulley. Soft tissue retractors were placed and the A1 pulley was visualized. A Merrick blade was used to incise the A1 pulley. Tenotomy scissors were then used to complete the A1 pulley release both proximally and distally. The patient was then asked to flex and  extend the fingers and make a full fist. There was no more triggering. The wound was then thoroughly irrigated with copious amounts of normal saline. The wound was then closed with 4-0 nylon in a horizontal mattress fashion. The wound was dressed with Xeroform, 4 x 4's and a Tegaderm dressing. The tourniquet was deflated. The patient was taken to the PACU in good and stable condition.    Disposition: The patient will be discharged home on a regular diet, weightbearing as tolerated with the exception of a 10 pound lifting restriction. Dressings may be removed after 48 hours at which time the patient may begin showering and washing hands as normal. They should not soak the wound. The patient was prescribed tramadol 50 mg 1 tab by mouth every 4 hours when necessary pain. They were instructed not to drive or operate machinery while taking this medication.  They will return to clinic in 10-14 days.

## 2021-08-04 NOTE — DISCHARGE INSTRUCTIONS
ACTIVITY: Rest and take it easy for the first 24 hours.  A responsible adult is recommended to remain with you during that time.  It is normal to feel sleepy.  We encourage you to not do anything that requires balance, judgment or coordination.    MILD FLU-LIKE SYMPTOMS ARE NORMAL. YOU MAY EXPERIENCE GENERALIZED MUSCLE ACHES, THROAT IRRITATION, HEADACHE AND/OR SOME NAUSEA.    FOR 24 HOURS DO NOT:  Drive, operate machinery or run household appliances.  Drink beer or alcoholic beverages.   Make important decisions or sign legal documents.    SPECIAL INSTRUCTIONS:   Keep dressings clean, dry and intact   No lifting anything heavier than 10 lbs with the operative hand   May remove dressings after 48 hrs   May begin showering and washing hands as normal after 48 hrs   Do not soak the wound   Keep hand elevated and apply ice as much as possible in the first three days   Do not operate a vehicle or machinery while taking narcotic pain medications   Return to clinic in 10-14 days   Please call the office with any questions [phone]    DIET: To avoid nausea, slowly advance diet as tolerated, avoiding spicy or greasy foods for the first day. Add more substantial food to your diet according to your physician's instructions. INCREASE FLUIDS AND FIBER TO AVOID CONSTIPATION.    FOLLOW-UP APPOINTMENT:  A follow-up appointment should be arranged with your doctor; call to schedule.    You should CALL YOUR PHYSICIAN if you develop:  Fever greater than 101 degrees F.  Pain not relieved by medication, or persistent nausea or vomiting.  Excessive bleeding (blood soaking through dressing) or unexpected drainage from the wound.  Extreme redness or swelling around the incision site, drainage of pus or foul smelling drainage.  Inability to urinate or empty your bladder within 8 hours.  Problems with breathing or chest pain.    You should call 911 if you develop problems with breathing or chest pain.  If you are unable to contact your  doctor or surgical center, you should go to the nearest emergency room or urgent care center.      If any questions arise, call your doctor.  If your doctor is not available, please feel free to call the Surgical Center at [phone]. The Center is open Monday through Friday from 7AM to 7PM. You can also call the HEALTH HOTLINE open 24 hours/day, 7 days/week and speak to a nurse at [phone], or toll free at [phone].    A registered nurse may call you a few days after your surgery to see how you are doing after your procedure.    MEDICATIONS: Resume taking daily medication.  Take prescribed pain medication with food.  If no medication is prescribed, you may take non-aspirin pain medication if needed. PAIN MEDICATION CAN BE VERY CONSTIPATING. Take a stool softener or laxative such as senokot, pericolace, or milk of magnesia if needed.    If your physician has prescribed pain medication that includes Acetaminophen (Tylenol), do not take additional Acetaminophen (Tylenol) while taking the prescribed medication.    Depression / Suicide Risk    As you are discharged from this [facility] facility, it is important to learn how to keep safe from harming yourself.    Recognize the warning signs:  · Abrupt changes in personality, positive or negative- including increase in energy   · Giving away possessions  · Change in eating patterns- significant weight changes-  positive or negative  · Change in sleeping patterns- unable to sleep or sleeping all the time   · Unwillingness or inability to communicate  · Depression  · Unusual sadness, discouragement and loneliness  · Talk of wanting to die  · Neglect of personal appearance   · Rebelliousness- reckless behavior  · Withdrawal from people/activities they love  · Confusion- inability to concentrate     If you or a loved one observes any of these behaviors or has concerns about self-harm, here's what you can do:  · Talk about it- your feelings and reasons for  harming yourself  · Remove any means that you might use to hurt yourself (examples: pills, rope, extension cords, firearm)  · Get professional help from the community (Mental Health, Substance Abuse, psychological counseling)  · Do not be alone:Call your Safe Contact- someone whom you trust who will be there for you.  · Call your local CRISIS HOTLINE 783-6204 or 202-178-3543  · Call your local Children's Mobile Crisis Response Team [place] (992) 408-2244 or www.[employer] Smooth Move  · Call the toll free National Suicide Prevention Hotlines   · National Suicide Prevention Lifeline 218-905-ITLJ (6794)  · National Hope Line Network 800-SUICIDE (627-9317)

## 2021-08-09 ENCOUNTER — PRE-ADMISSION TESTING (OUTPATIENT)
Dept: ADMISSIONS | Facility: MEDICAL CENTER | Age: 66
End: 2021-08-09
Attending: ORTHOPAEDIC SURGERY
Payer: MEDICARE

## 2021-08-09 RX ORDER — BLOOD-GLUCOSE METER
KIT MISCELLANEOUS
Status: ON HOLD | COMMUNITY
Start: 2021-08-03 | End: 2021-11-22

## 2021-08-09 RX ORDER — TRAMADOL HYDROCHLORIDE 50 MG/1
TABLET ORAL
COMMUNITY
Start: 2021-08-04 | End: 2021-10-06

## 2021-08-09 NOTE — PREPROCEDURE INSTRUCTIONS
"Pt was just here on 8/4/2021, no change to history or medications.  Preadmit Phone appointment: \" Preparing for your Procedure information\" Instructions discussed with Patient.   Patient instructed to continue prescribed medications through the day before surgery, instructed to take the following medications the day of surgery per anesthesia protocol:  Albuterol PRN, Nexium, Flonase, Creon (if eating Per MD) Zoloft.   Pt states, \"Severe PONV issues with anesthesia\".  Fasting guidelines discussed with Patient,  NPO from solid food at midnight prior to surgery.  Pt encouraged to hydrate the day before surgery.with NPO from solid food at midnight prior to surgery and 3 hour cutoff for clear liquids.   Clear liquids defined.  Pt told to call the doctor and discuss how she should manage her medication vs diabetes the morning of surgery.    All Pt's questions and concerns answered or addressed.  Emailed all instructions.  COVID Vaccinations x 2.  Pt was severely nauseated after the last carpal tunnel surgery, she will discuss this with anesthesia.   FSBS day of surgery  "

## 2021-08-23 ENCOUNTER — HOSPITAL ENCOUNTER (OUTPATIENT)
Dept: LAB | Facility: MEDICAL CENTER | Age: 66
End: 2021-08-23
Attending: PHYSICIAN ASSISTANT
Payer: MEDICARE

## 2021-08-23 LAB
ALBUMIN SERPL BCP-MCNC: 3.3 G/DL (ref 3.2–4.9)
ALBUMIN/GLOB SERPL: 1.1 G/DL
ALP SERPL-CCNC: 410 U/L (ref 30–99)
ALT SERPL-CCNC: 67 U/L (ref 2–50)
ANION GAP SERPL CALC-SCNC: 13 MMOL/L (ref 7–16)
AST SERPL-CCNC: 56 U/L (ref 12–45)
BILIRUB SERPL-MCNC: 0.4 MG/DL (ref 0.1–1.5)
BUN SERPL-MCNC: 12 MG/DL (ref 8–22)
CALCIUM SERPL-MCNC: 8.6 MG/DL (ref 8.5–10.5)
CHLORIDE SERPL-SCNC: 106 MMOL/L (ref 96–112)
CHOLEST SERPL-MCNC: 117 MG/DL (ref 100–199)
CO2 SERPL-SCNC: 23 MMOL/L (ref 20–33)
CREAT SERPL-MCNC: 0.33 MG/DL (ref 0.5–1.4)
CREAT UR-MCNC: 98.86 MG/DL
EST. AVERAGE GLUCOSE BLD GHB EST-MCNC: 140 MG/DL
GLOBULIN SER CALC-MCNC: 3 G/DL (ref 1.9–3.5)
GLUCOSE SERPL-MCNC: 154 MG/DL (ref 65–99)
HBA1C MFR BLD: 6.5 % (ref 4–5.6)
HDLC SERPL-MCNC: 42 MG/DL
HGB BLD-MCNC: 13.9 G/DL (ref 12–16)
LDLC SERPL CALC-MCNC: 66 MG/DL
MICROALBUMIN UR-MCNC: 2.3 MG/DL
MICROALBUMIN/CREAT UR: 23 MG/G (ref 0–30)
POTASSIUM SERPL-SCNC: 3.3 MMOL/L (ref 3.6–5.5)
PROT SERPL-MCNC: 6.3 G/DL (ref 6–8.2)
SODIUM SERPL-SCNC: 142 MMOL/L (ref 135–145)
TRIGL SERPL-MCNC: 46 MG/DL (ref 0–149)

## 2021-08-23 PROCEDURE — 36415 COLL VENOUS BLD VENIPUNCTURE: CPT

## 2021-08-23 PROCEDURE — 80061 LIPID PANEL: CPT

## 2021-08-23 PROCEDURE — 82570 ASSAY OF URINE CREATININE: CPT

## 2021-08-23 PROCEDURE — 85018 HEMOGLOBIN: CPT

## 2021-08-23 PROCEDURE — 80053 COMPREHEN METABOLIC PANEL: CPT

## 2021-08-23 PROCEDURE — 83036 HEMOGLOBIN GLYCOSYLATED A1C: CPT

## 2021-08-23 PROCEDURE — 82043 UR ALBUMIN QUANTITATIVE: CPT

## 2021-08-31 ENCOUNTER — HOSPITAL ENCOUNTER (OUTPATIENT)
Dept: LAB | Facility: MEDICAL CENTER | Age: 66
End: 2021-08-31
Attending: INTERNAL MEDICINE
Payer: MEDICARE

## 2021-08-31 LAB
ANION GAP SERPL CALC-SCNC: 12 MMOL/L (ref 7–16)
BUN SERPL-MCNC: 11 MG/DL (ref 8–22)
CALCIUM SERPL-MCNC: 8.9 MG/DL (ref 8.5–10.5)
CHLORIDE SERPL-SCNC: 107 MMOL/L (ref 96–112)
CO2 SERPL-SCNC: 23 MMOL/L (ref 20–33)
CREAT SERPL-MCNC: 0.47 MG/DL (ref 0.5–1.4)
GLUCOSE SERPL-MCNC: 89 MG/DL (ref 65–99)
POTASSIUM SERPL-SCNC: 4.3 MMOL/L (ref 3.6–5.5)
SODIUM SERPL-SCNC: 142 MMOL/L (ref 135–145)

## 2021-08-31 PROCEDURE — 36415 COLL VENOUS BLD VENIPUNCTURE: CPT

## 2021-08-31 PROCEDURE — 80048 BASIC METABOLIC PNL TOTAL CA: CPT

## 2021-09-09 ENCOUNTER — TELEMEDICINE (OUTPATIENT)
Dept: ADMISSIONS | Facility: MEDICAL CENTER | Age: 66
End: 2021-09-09
Attending: ORTHOPAEDIC SURGERY
Payer: MEDICARE

## 2021-09-09 RX ORDER — MULTIVIT,IRON,MINERALS/LUTEIN
1 TABLET ORAL 2 TIMES DAILY
COMMUNITY

## 2021-09-09 NOTE — PREPROCEDURE INSTRUCTIONS
Pre admit appointment via telephone. History and medications reviewed. Pre-op instructions given, hand outs reviewed and questions answered.    Anesthesia fasting guidelines reviewed. Patient instructed to continue regularly prescribed medications through the day before surgery. Per anesthesia protocol, patient instructed to take these medications with a sip of water the day of surgery: zoloft.    Covid-19 vaccinated, instructed to wear a mask in per [facility] [place] guidelines and notify MD office with any signs/symptoms of illness prior to procedure.    Patient notified she needs an EKG prior to procedure per anesthesia protocol. Patient reports she is unable to come in for testing prior to procedure due to difficulty driving and living outside of [place]. Patient wanting to do EKG on the day of.  Patient informed that her surgery could be canceled on the day of the procedure pending EKG results and indicated understanding.

## 2021-09-29 ENCOUNTER — HOSPITAL ENCOUNTER (OUTPATIENT)
Dept: RADIOLOGY | Facility: MEDICAL CENTER | Age: 66
End: 2021-09-29
Attending: INTERNAL MEDICINE
Payer: MEDICARE

## 2021-09-29 DIAGNOSIS — K86.89 PANCREATIC FISTULA: ICD-10-CM

## 2021-09-29 DIAGNOSIS — C25.0 MALIGNANT NEOPLASM OF HEAD OF PANCREAS (HCC): ICD-10-CM

## 2021-09-29 DIAGNOSIS — R94.5 ABNORMAL LIVER FUNCTION: ICD-10-CM

## 2021-09-29 PROCEDURE — 700117 HCHG RX CONTRAST REV CODE 255: Performed by: INTERNAL MEDICINE

## 2021-09-29 PROCEDURE — A9576 INJ PROHANCE MULTIPACK: HCPCS | Performed by: INTERNAL MEDICINE

## 2021-09-29 PROCEDURE — 74183 MRI ABD W/O CNTR FLWD CNTR: CPT | Mod: MH

## 2021-09-29 RX ADMIN — GADOTERIDOL 15 ML: 279.3 INJECTION, SOLUTION INTRAVENOUS at 19:46

## 2021-10-06 ENCOUNTER — OFFICE VISIT (OUTPATIENT)
Dept: SURGICAL ONCOLOGY | Facility: MEDICAL CENTER | Age: 66
End: 2021-10-06
Payer: MEDICARE

## 2021-10-06 VITALS
DIASTOLIC BLOOD PRESSURE: 74 MMHG | TEMPERATURE: 97.7 F | HEIGHT: 61 IN | OXYGEN SATURATION: 92 % | BODY MASS INDEX: 28.47 KG/M2 | HEART RATE: 96 BPM | SYSTOLIC BLOOD PRESSURE: 122 MMHG | WEIGHT: 150.8 LBS

## 2021-10-06 DIAGNOSIS — R93.5 ABNORMAL MRI OF ABDOMEN: ICD-10-CM

## 2021-10-06 DIAGNOSIS — R10.9 ABDOMINAL PAIN, UNSPECIFIED ABDOMINAL LOCATION: ICD-10-CM

## 2021-10-06 DIAGNOSIS — C7B.8 METASTATIC MALIGNANT NEUROENDOCRINE TUMOR TO LIVER (HCC): ICD-10-CM

## 2021-10-06 DIAGNOSIS — D37.6 NEOPLASM OF UNCERTAIN BEHAVIOR OF LIVER: ICD-10-CM

## 2021-10-06 DIAGNOSIS — C25.0 MALIGNANT NEOPLASM OF HEAD OF PANCREAS (HCC): ICD-10-CM

## 2021-10-06 PROCEDURE — 99205 OFFICE O/P NEW HI 60 MIN: CPT | Performed by: SURGERY

## 2021-10-06 ASSESSMENT — ENCOUNTER SYMPTOMS
DIARRHEA: 1
DIZZINESS: 1
ABDOMINAL PAIN: 1
SHORTNESS OF BREATH: 1
SINUS PAIN: 1
FALLS: 1

## 2021-10-06 ASSESSMENT — FIBROSIS 4 INDEX: FIB4 SCORE: 2.38

## 2021-10-06 NOTE — PATIENT INSTRUCTIONS
The patient will undergo a liver biopsy followed by a serum glucagon and 5 HIAA study.  She will then see me in the office after these have been completed.

## 2021-10-06 NOTE — PROGRESS NOTES
Subjective:   10/6/2021  6:48 AM  Primary care physician:Brigitte Salmon D.O.  Referring Provider: Edward Rubin MD   Medical Oncologist: GISELLE    Chief Complaint:   Chief Complaint   Patient presents with   • New Patient     NP/OLD PATIENT HX OF PANCREATIC CA/WHIPPLE 2014 NEW LIVER MASS REF DR RUBIN      Diagnosis:   1. Malignant neoplasm of head of pancreas (CMS-HCC)  IR-LIVER BX PROCEDURE    GLUCAGON PLASMA    5-HYDROXYINDOLACETIC ACID (5-HIAA)   2. Neoplasm of uncertain behavior of liver  IR-LIVER BX PROCEDURE    GLUCAGON PLASMA    5-HYDROXYINDOLACETIC ACID (5-HIAA)   3. Abdominal pain, unspecified abdominal location  IR-LIVER BX PROCEDURE    GLUCAGON PLASMA    5-HYDROXYINDOLACETIC ACID (5-HIAA)   4. Abnormal MRI of abdomen  IR-LIVER BX PROCEDURE    GLUCAGON PLASMA    5-HYDROXYINDOLACETIC ACID (5-HIAA)   5. Metastatic malignant neuroendocrine tumor to liver (HCC)         History of presenting illness:  Denise Gaines  is a pleasant 65 y.o. year old female who presented with what appears to be a new liver lesion.  Patient is known to me having undergone a Whipple procedure back in 2014 for pancreatic neuroendocrine tumor.  At the time her pathology revealed a well-differentiated intermediate grade 2 cm pancreatic neuroendocrine tumor with 2 oh 11 nodes positive.  She was a T3N1.  She did well postop but has not been seen since approximately late 2000's.  In any event, the patient had some discomfort underwent imaging and was found to have a mass in the right lobe of the liver and around segment 5/6.  She was seen by Dr. Rubin who then proceeded with working her up and referring her in.  He also ordered liver biopsy unfortunately scheduled for November 1.  This is unacceptable.  The patient is here to see me.  She denies any fever or chills, nausea or vomiting.  She denies any flushing or diarrhea but she was recently diagnosed over the last 3 years with uncontrolled diabetes.  It is so bad that she was placed  on the insulin pump and that her endocrinologist is still having a lot of difficult controlling her sugars.  She has not had a glucagon but we will send off for 5 HIAA.  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  In any event, the patient has been in good spirits.  She has an ECOG performance status of 0.  She has had no real recent admissions to the hospital and she is not on any blood thinners.  She has no cardiac or lung disease.  Her kidney is functioning well.  She is here to discuss next steps.  I personally reviewed the patient's MRI her past medical history and spoke to her gastroenterologist about this case.  I also reviewed her old records and imaging studies dating back to 2014.      Past Medical History:   Diagnosis Date   • Acid reflux    • Adverse effect of anesthesia     Pt was very sick after surgery on 8/4/2021 under a local    • Anesthesia     PONV/ slow to emerge per pt   • Arthritis 4/7/17    Bilateral shoulders and both hands-osteo   • ASTHMA     allergy induced-uses inhalers prn/flonase   • Blood clotting disorder (Formerly [facility]) dx 10/2016    Right eye retina-Treated with laser and injections   • Bowel habit changes     Diarrhea chronic/ fatty stools at times   • Bronchitis 12/2017   • Cancer (Former[facility]) 12/2013-dx    pancreatic CA. Whipple procedure 1/2014   • Carotid artery plaque 2016    per ultrasound   • Cataract     Bilateral-mild   • Colitis since age 14   • Delayed emergence from general anesthesia    • Diabetes (Former[facility]) 2014    post whipple procedure/ uses humalog, insuliln pump   • Heart burn     treated with nexium   • Heart murmur     since birth, slight   • High cholesterol    • Indigestion     takes creon digestive enzymes   • MRSA (methicillin resistant Staphylococcus aureus) 11/2016    Left hand-cleared    • MRSA (methicillin resistant Staphylococcus aureus) 1/30/2017    Back of left thigh-cleared.   • Neuropathy 07/2021    viky hands and feet   •  Pneumonia 2012    gets a pneumovax   • PONV (postoperative nausea and vomiting)     Pt was very sick after surgery on 8/4/2021   • Psychiatric problem     depression/ anxiety    • Shoulder pain 4/7/17    Chronic to right shoulder   • Snoring     sinus issues   • Unspecified urinary incontinence     uses pad   • Urinary bladder disorder     incontinence     Past Surgical History:   Procedure Laterality Date   • PB INCISE FINGER TENDON SHEATH Right 8/4/2021    Procedure: RELEASE, TRIGGER FINGER - THUMB, A1 UNRULY.;  Surgeon: Drew Duong M.D.;  Location: [facility];  Service: Orthopedics   • PB SHLDR ARTHROSCOP,PART ACROMIOPLAS Right 1/10/2020    Procedure: DECOMPRESSION, SHOULDER, ARTHROSCOPIC - SUBACROMIAL W/LABRAL DEBRIDEMENT, SUPERIOR CAPSULAR RECONSTRUCTION, VALLADARES;  Surgeon: Mei Palacios M.D.;  Location: [facility];  Service: Orthopedics   • BICEPS TENODESIS Right 1/10/2020    Procedure: TENODESIS, BICEPS;  Surgeon: Mei Palacios M.D.;  Location: [facility];  Service: Orthopedics   • ROTATOR CUFF REPAIR Right 1/10/2020    Procedure: REPAIR, ROTATOR CUFF;  Surgeon: Mei Palacios M.D.;  Location: [facility];  Service: Orthopedics   • GASTROSCOPY  3/15/2018    Procedure: GASTROSCOPY - POSS BIOPSY, DILATION, POLYPECTOMY, CONTROL OF HEMORRHAGE;  Surgeon: Edward Rubin M.D.;  Location: [facility];  Service: EUS   • EGD W/ENDOSCOPIC ULTRASOUND  3/15/2018    Procedure: EGD W/ENDOSCOPIC ULTRASOUND;  Surgeon: Edward Rubin M.D.;  Location: [facility];  Service: EUS   • EGD WITH ASP/BX  3/15/2018    Procedure: EGD WITH ASP/BX;  Surgeon: Edward Rubin M.D.;  Location: [facility];  Service: EUS   • GASTROSCOPY N/A 4/13/2017    Procedure: GASTROSCOPY - W/POSS BIOPSY, DILATION, POLYPECTOMY, CONTROL OF HEMORRHAGE;  Surgeon: Edward Rubin M.D.;  Location: [facility];  Service:    • EGD  W/ENDOSCOPIC ULTRASOUND N/A 4/13/2017    Procedure: EGD W/ENDOSCOPIC ULTRASOUND;  Surgeon: Edward Rubin M.D.;  Location: SURGERY [facility];  Service:    • COLONOSCOPY  2016    x 2   • ERCP-DIAGNOSTIC W/BIOPSY  8/2015    several to verify no return of pancreatic CA    • HERNIA REPAIR  7/2014    5 umbilical repaired at same time   • LAPAROSCOPY  1/27/2014    Performed by Shahbaz Jang M.D. at SURGERY [facility]   • WHIPPLE PROCEDURE  1/27/2014    Performed by Shahbaz Jang M.D. at SURGERY [facility]   • NODE DISSECTION  1/27/2014    Performed by Shahbaz Jang M.D. at SURGERY [facility]   • TONSILLECTOMY  2011   • OTHER ORTHOPEDIC SURGERY Left 2009    Nerve damage S/P lumbar surgery-foot broken and pinned.    • LUMBAR LAMINECTOMY DISKECTOMY  2008    L5   • SHOULDER ARTHROSCOPY W/ ROTATOR CUFF REPAIR Right 2006   • CARPAL TUNNEL RELEASE Right 2005   • BLADDER SUSPENSION  2003   • HYSTERECTOMY, TOTAL ABDOMINAL  1990     Allergies   Allergen Reactions   • Bee Anaphylaxis     Wasps   • Clindamycin Hives and Rash     Head to toe rash like chicken pox for 4 months.   • Penicillins Anaphylaxis   • Sulfa Drugs Hives and Itching     Rash hives itching   • Tape Rash     Rash and welt at site   • Codeine Vomiting   • Percocet [Oxycodone-Acetaminophen] Vomiting   • Vicodin [Hydrocodone-Acetaminophen] Vomiting     Outpatient Encounter Medications as of 10/6/2021   Medication Sig Dispense Refill   • Multiple Minerals-Vitamins (CALCIUM-MAGNESIUM-ZINC-D3) Tab Take 1 Tablet by mouth 2 times a day.     • FREESTYLE LITE strip      • albuterol (ACCUNEB) 1.25 MG/3ML nebulizer solution Inhale 1.25 mg by mouth 1 time daily as needed.     • albuterol (VENTOLIN HFA) 108 (90 Base) MCG/ACT Aero Soln inhalation aerosol Inhale 90 mcg by mouth Once PRN.     • fluticasone (FLONASE) 50 MCG/ACT nasal spray Spray 1 Spray in nose 2 times a day.     • HUMALOG 100 UNIT/ML Inject 50 Units under the  skin every day. 40-60 units-insulin pump     • atorvastatin (LIPITOR) 10 MG Tab Take 10 mg by mouth every evening.     • esomeprazole (NEXIUM) 40 MG delayed-release capsule Take 40 mg by mouth every evening.     • B Complex Vitamins (B COMPLEX 1 PO) Take 500 mcg by mouth every day.     • ibuprofen (MOTRIN) 400 MG Tab Take 800 mg by mouth every 8 hours as needed.     • Probiotic Product (PROBIOTIC ADVANCED PO) Take 1 Cap by mouth every evening.     • Multiple Vitamins-Minerals (MULTIVITAMIN ADULT PO) Take 1 Tab by mouth every day.     • Pseudoephedrine-Guaifenesin (MUCINEX D PO) Take 1 Tablet by mouth 1 time a day as needed.     • Pancrelipase, Lip-Prot-Amyl, (CREON) 44114 UNITS Cap DR Particles Take 2 Tabs by mouth 3 times a day, with meals.     • sertraline (ZOLOFT) 100 MG TABS Take 150 mg by mouth every morning.     • [DISCONTINUED] traMADol (ULTRAM) 50 MG Tab  (Patient not taking: Reported on 9/9/2021)     • [DISCONTINUED] Methylsulfonylmethane (MSM) 1000 MG Tab Take 1,000 mg by mouth every day. (Patient not taking: Reported on 8/4/2021)     • [DISCONTINUED] Ergocalciferol (VITAMIN D2) 400 units Tab Take 800 Units by mouth every evening. (Patient not taking: Reported on 9/9/2021)       No facility-administered encounter medications on file as of 10/6/2021.     Social History     Socioeconomic History   • Marital status: Single     Spouse name: Not on file   • Number of children: Not on file   • Years of education: Not on file   • Highest education level: Not on file   Occupational History   • Not on file   Tobacco Use   • Smoking status: Never Smoker   • Smokeless tobacco: Never Used   Vaping Use   • Vaping Use: Never used   Substance and Sexual Activity   • Alcohol use: Not Currently     Comment: one drink once every few months   • Drug use: Yes     Types: Marijuana     Comment: does use CBD oil topical/with THC occasional orally    • Sexual activity: Not on file   Other Topics Concern   • Not on file   Social  History Narrative   • Not on file     Social Determinants of Health     Financial Resource Strain:    • Difficulty of Paying Living Expenses:    Food Insecurity:    • Worried About Running Out of Food in the Last Year:    • Ran Out of Food in the Last Year:    Transportation Needs:    • Lack of Transportation (Medical):    • Lack of Transportation (Non-Medical):    Physical Activity:    • Days of Exercise per Week:    • Minutes of Exercise per Session:    Stress:    • Feeling of Stress :    Social Connections:    • Frequency of Communication with Friends and Family:    • Frequency of Social Gatherings with Friends and Family:    • Attends [employer] Services:    • Active Member of Clubs or Organizations:    • Attends Club or Organization Meetings:    • Marital Status:    Intimate Partner Violence:    • Fear of Current or Ex-Partner:    • Emotionally Abused:    • Physically Abused:    • Sexually Abused:       Social History     Tobacco Use   Smoking Status Never Smoker   Smokeless Tobacco Never Used     Social History     Substance and Sexual Activity   Alcohol Use Not Currently    Comment: one drink once every few months     Social History     Substance and Sexual Activity   Drug Use Yes   • Types: Marijuana    Comment: does use CBD oil topical/with THC occasional orally         Family History   Problem Relation Age of Onset   • Lung Cancer Mother    • Dementia Father        Review of Systems   Constitutional: Positive for malaise/fatigue.   HENT: Positive for sinus pain.    Respiratory: Positive for shortness of breath.    Gastrointestinal: Positive for abdominal pain and diarrhea.        Change of appitite   Genitourinary: Positive for frequency.   Musculoskeletal: Positive for falls.        Muscle weakness   Neurological: Positive for dizziness.   Endo/Heme/Allergies: Positive for environmental allergies.   All other systems reviewed and are negative.       Objective:   /74 (BP Location: Right arm, Patient  "Position: Sitting, BP Cuff Size: Adult)   Pulse 96   Temp 36.5 °C (97.7 °F) (Temporal)   Ht 1.549 m (5' 1\")   Wt 68.4 kg (150 lb 12.8 oz)   SpO2 92%   BMI 28.49 kg/m²     Physical Exam  Vitals and nursing note reviewed.   Constitutional:       Appearance: Normal appearance.   HENT:      Head: Normocephalic and atraumatic.      Nose: Nose normal.      Mouth/Throat:      Mouth: Mucous membranes are moist.   Eyes:      Extraocular Movements: Extraocular movements intact.      Conjunctiva/sclera: Conjunctivae normal.      Pupils: Pupils are equal, round, and reactive to light.   Cardiovascular:      Rate and Rhythm: Normal rate and regular rhythm.      Pulses: Normal pulses.      Heart sounds: Normal heart sounds.   Pulmonary:      Effort: Pulmonary effort is normal.      Breath sounds: Normal breath sounds.   Abdominal:      General: Abdomen is flat. Bowel sounds are normal.      Palpations: Abdomen is soft.      Tenderness: There is abdominal tenderness.      Comments: Slight right upper quadrant discomfort  Her incision is clean, dry, and intact and no obvious hernia   Musculoskeletal:      Cervical back: Normal range of motion and neck supple.   Neurological:      Mental Status: She is alert.         Labs:  Results for THONY ZUÑIGA (MRN [id number]) as of 10/6/2021 06:53   Ref. Range 8/23/2021 09:35   Hemoglobin Latest Ref Range: 12.0 - 16.0 g/dL 13.9   Sodium Latest Ref Range: 135 - 145 mmol/L 142   Potassium Latest Ref Range: 3.6 - 5.5 mmol/L 3.3 (L)   Chloride Latest Ref Range: 96 - 112 mmol/L 106   Co2 Latest Ref Range: 20 - 33 mmol/L 23   Anion Gap Latest Ref Range: 7.0 - 16.0  13.0   Glucose Latest Ref Range: 65 - 99 mg/dL 154 (H)   Bun Latest Ref Range: 8 - 22 mg/dL 12   Creatinine Latest Ref Range: 0.50 - 1.40 mg/dL 0.33 (L)   GFR If  Latest Ref Range: >60 mL/min/1.73 m 2 >60   GFR If Non  Latest Ref Range: >60 mL/min/1.73 m 2 >60   Calcium Latest Ref Range: 8.5 - " 10.5 mg/dL 8.6   AST(SGOT) Latest Ref Range: 12 - 45 U/L 56 (H)   ALT(SGPT) Latest Ref Range: 2 - 50 U/L 67 (H)   Alkaline Phosphatase Latest Ref Range: 30 - 99 U/L 410 (H)   Total Bilirubin Latest Ref Range: 0.1 - 1.5 mg/dL 0.4   Albumin Latest Ref Range: 3.2 - 4.9 g/dL 3.3   Total Protein Latest Ref Range: 6.0 - 8.2 g/dL 6.3   Globulin Latest Ref Range: 1.9 - 3.5 g/dL 3.0   A-G Ratio Latest Units: g/dL 1.1   Glycohemoglobin Latest Ref Range: 4.0 - 5.6 % 6.5 (H)   Estim. Avg Glu Latest Units: mg/dL 140   Cholesterol,Tot Latest Ref Range: 100 - 199 mg/dL 117   Triglycerides Latest Ref Range: 0 - 149 mg/dL 46   HDL Latest Ref Range: >=40 mg/dL 42   LDL Latest Ref Range: <100 mg/dL 66   Micro Alb Creat Ratio Latest Ref Range: 0 - 30 mg/g 23   Creatinine, Urine Latest Units: mg/dL 98.86   Microalbumin, Urine Random Latest Units: mg/dL 2.3       Imagin21 MRI     Per my read,     IMPRESSION:     1.  Development of hypervascular mass within the right lobe the liver at the junction of hepatic segments 5 and 8 and having restricted diffusion     2.  This is most suspicious for a metastasis although could be intrahepatic cholangiocarcinoma.     3.  There is associated localized biliary ductal dilation peripheral to the mass     4.  Changes consistent with prior Whipple procedure with associated pneumobilia      Pathology: 14    FINAL DIAGNOSIS:     A. Celiac nodes:          Three benign lymph nodes (0/3).   B. Gallbladder:          Benign gallbladder with mild chronic cholecystitis,           cholesterolosis, and cholelithiasis.   C. Common hepatic duct margin:          Cauterized bile duct wall with denudation/ulceration of the           surface epithelium, inflammation, and reactive fibrosis.          No malignancy identified.   D. Whipple:          Well-differentiated neuroendocrine tumor measuring 1.9 cm in           maximal dimension (see comment).          Extension into the peripancreatic adipose tissue  is identified.          Tumor focally involves the uncinate margin.          Tumor with associated fibrosis extends less than 1mm from the           intrapancreatic portion of the common bile duct.          Chronic pancreatitis with fibrosis is present at the parenchymal           margin.   E. Antrum:          Portion of benign gastric wall and duodenum.          One benign lymph node identified in the attached adipose tissue           (0/1).     Synoptic Report for Pancreas (Neuroendocrine Tumor):          -Specimen: Head of pancreas, duodenum, common bile duct,         gallbladder, and portion of stomach.        -Procedure: Pancreaticoduodenectomy (Whipple resection), partial         pancreatectomy.        -Tumor Site: Pancreatic head.        -Tumor Size: 1.9 x 1.5 cm.        -Tumor Focality: Unifocal.        -Histologic Type and Grade: Well-differentiated neuroendocrine         tumor; G2: Intermediate grade (see comment)        -Mitotic Rate: <2 mitoses/10 high-power fields.         Specify mitoses per 10 HPF: 1.6 (8/50 HPFs counted).        -Microscopic Tumor Extension: Tumor invades peripancreatic soft         tissues.        -Margins: Uncinate process (retroperitoneal) margin focally         involved with tumor.         All remaining margins uninvolved by tumor.        -Lymph-Vascular Invasion: Not identified.        -Perineural Invasion: Present.        -Pathologic Staging:         Primary Tumor: pT3         Regional Lymph Nodes: pN1           Number of lymph nodes examined: 11           Number of lymph nodes involved: 2        -Distant Metastasis: Not applicable.        -Ancillary Studies: Ki-67 labeling index 3%-20% range (8% 55 cells         counted).     Procedures: 1/27/14    1.  Exploratory laparoscopy/staging laparoscopy procedure.  2.  Open laparotomy procedure.  3.  Standard pancreaticoduodenectomy procedure.  4.  Celiac portal node dissection.  5.  Omental flap.  6.  Intraoperative ultrasound.  7.   Vein repair done by Dr. Doty.    Diagnosis:     1. Malignant neoplasm of head of pancreas (CMS-HCC)  IR-LIVER BX PROCEDURE    GLUCAGON PLASMA    5-HYDROXYINDOLACETIC ACID (5-HIAA)   2. Neoplasm of uncertain behavior of liver  IR-LIVER BX PROCEDURE    GLUCAGON PLASMA    5-HYDROXYINDOLACETIC ACID (5-HIAA)   3. Abdominal pain, unspecified abdominal location  IR-LIVER BX PROCEDURE    GLUCAGON PLASMA    5-HYDROXYINDOLACETIC ACID (5-HIAA)   4. Abnormal MRI of abdomen  IR-LIVER BX PROCEDURE    GLUCAGON PLASMA    5-HYDROXYINDOLACETIC ACID (5-HIAA)   5. Metastatic malignant neuroendocrine tumor to liver (HCC)             Medical Decision Making:  Today's Assessment / Status / Plan:     In light of the present findings, the patient may have 1 of 3 things, HCC, cholangiocarcinoma, or metastatic pancreatic neuroendocrine tumor.  Most likely is the pancreatic neuroendocrine tumor.  What is unusual is the patient is suffering from hyperglycemia that is difficult to control so I am recommending the patient get a glucagon level of 5 HIAA level.  She is presently on an insulin pump.  This occurred approximately 2 to 3 years ago which would coincide with possibly recurrence.  The patient is also going to be sent for a liver biopsy and that will guide her next steps.  I did stress to her that no matter what it is we will be able to address it surgically.    I, Dr. Jang have entered, reviewed and confirmed the above diagnoses related to this patient on this date of service, 10/6/2021  6:48 AM.    She agreed and verbalized her agreement and understanding with the current plan. I answered all questions and concerns she has at this time and advised her to call at any time in the interim with questions or concerns in regards to her care.    Thank you for allowing me to participate in her care, I will continue to follow closely.       Please note that this dictation was created using voice recognition software. I have made every  reasonable attempt to correct obvious errors, but I expect that there are errors of grammar and possibly content that I did not discover before finalizing the note.     Thank you for this consultation and allowing me to participate in your patient's care. If I can be of further service please contact my office.

## 2021-10-08 ENCOUNTER — HOSPITAL ENCOUNTER (OUTPATIENT)
Dept: LAB | Facility: MEDICAL CENTER | Age: 66
End: 2021-10-08
Attending: SURGERY
Payer: MEDICARE

## 2021-10-08 DIAGNOSIS — R93.5 ABNORMAL MRI OF ABDOMEN: ICD-10-CM

## 2021-10-08 DIAGNOSIS — C25.0 MALIGNANT NEOPLASM OF HEAD OF PANCREAS (HCC): ICD-10-CM

## 2021-10-08 DIAGNOSIS — R10.9 ABDOMINAL PAIN, UNSPECIFIED ABDOMINAL LOCATION: ICD-10-CM

## 2021-10-08 DIAGNOSIS — D37.6 NEOPLASM OF UNCERTAIN BEHAVIOR OF LIVER: ICD-10-CM

## 2021-10-11 ENCOUNTER — PRE-ADMISSION TESTING (OUTPATIENT)
Dept: ADMISSIONS | Facility: MEDICAL CENTER | Age: 66
End: 2021-10-11
Attending: INTERNAL MEDICINE
Payer: MEDICARE

## 2021-10-12 NOTE — PROGRESS NOTES
10/6/2021  6:48 AM  Primary care physician:Brigitte Salmon D.O.  Referring Provider: Edward Rubin MD   Medical Oncologist: GISELLE    Chief Complaint:   Chief Complaint   Patient presents with   • New Patient     NP/OLD PATIENT HX OF PANCREATIC CA/WHIPPLE 2014 NEW LIVER MASS REF DR RUBIN      Diagnosis:   1. Malignant neoplasm of head of pancreas (CMS-HCC)  IR-LIVER BX PROCEDURE    GLUCAGON PLASMA    5-HYDROXYINDOLACETIC ACID (5-HIAA)   2. Neoplasm of uncertain behavior of liver  IR-LIVER BX PROCEDURE    GLUCAGON PLASMA    5-HYDROXYINDOLACETIC ACID (5-HIAA)   3. Abdominal pain, unspecified abdominal location  IR-LIVER BX PROCEDURE    GLUCAGON PLASMA    5-HYDROXYINDOLACETIC ACID (5-HIAA)   4. Abnormal MRI of abdomen  IR-LIVER BX PROCEDURE    GLUCAGON PLASMA    5-HYDROXYINDOLACETIC ACID (5-HIAA)   5. Metastatic malignant neuroendocrine tumor to liver (HCC)         History of presenting illness:  Denise Gaines  is a pleasant 65 y.o. year old female who presented with what appears to be a new liver lesion.  Patient is known to me having undergone a Whipple procedure back in 2014 for pancreatic neuroendocrine tumor.  At the time her pathology revealed a well-differentiated intermediate grade 2 cm pancreatic neuroendocrine tumor with 2 oh 11 nodes positive.  She was a T3N1.  She did well postop but has not been seen since approximately late 2000's.  In any event, the patient had some discomfort underwent imaging and was found to have a mass in the right lobe of the liver and around segment 5/6.  She was seen by Dr. Rubin who then proceeded with working her up and referring her in.  He also ordered liver biopsy unfortunately scheduled for November 1.  This is unacceptable.  The patient is here to see me.  She denies any fever or chills, nausea or vomiting.  She denies any flushing or diarrhea but she was recently diagnosed over the last 3 years with uncontrolled diabetes.  It is so bad that she was placed on the  insulin pump and that her endocrinologist is still having a lot of difficult controlling her sugars.  She has not had a glucagon but we will send off for 5 HIAA.  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  In any event, the patient has been in good spirits.  She has an ECOG performance status of 0.  She has had no real recent admissions to the hospital and she is not on any blood thinners.  She has no cardiac or lung disease.  Her kidney is functioning well.  She is here to discuss next steps.  I personally reviewed the patient's MRI her past medical history and spoke to her gastroenterologist about this case.  I also reviewed her old records and imaging studies dating back to 2014.      Past Medical History:   Diagnosis Date   • Acid reflux    • Adverse effect of anesthesia     Pt was very sick after surgery on 8/4/2021 under a local    • Anesthesia     PONV/ slow to emerge per pt   • Arthritis 4/7/17    Bilateral shoulders and both hands-osteo   • ASTHMA     allergy induced-uses inhalers prn/flonase   • Blood clotting disorder ([facility]) dx 10/2016    Right eye retina-Treated with laser and injections   • Bowel habit changes     Diarrhea chronic/ fatty stools at times   • Bronchitis 12/2017   • Cancer ([facility]) 12/2013-dx    pancreatic CA. Whipple procedure 1/2014   • Carotid artery plaque 2016    per ultrasound   • Cataract     Bilateral-mild   • Colitis since age 14   • Delayed emergence from general anesthesia    • Diabetes ([facility]) 2014    post whipple procedure/ uses humalog, insuliln pump   • Heart burn     treated with nexium   • Heart murmur     since birth, slight   • High cholesterol    • Indigestion     takes creon digestive enzymes   • MRSA (methicillin resistant Staphylococcus aureus) 11/2016    Left hand-cleared    • MRSA (methicillin resistant Staphylococcus aureus) 1/30/2017    Back of left thigh-cleared.   • Neuropathy 07/2021    viky hands and feet   • Pneumonia  2012    gets a pneumovax   • PONV (postoperative nausea and vomiting)     Pt was very sick after surgery on 8/4/2021   • Psychiatric problem     depression/ anxiety    • Shoulder pain 4/7/17    Chronic to right shoulder   • Snoring     sinus issues   • Unspecified urinary incontinence     uses pad   • Urinary bladder disorder     incontinence     Past Surgical History:   Procedure Laterality Date   • PB INCISE FINGER TENDON SHEATH Right 8/4/2021    Procedure: RELEASE, TRIGGER FINGER - THUMB, A1 UNRULY.;  Surgeon: Drew Duong M.D.;  Location: [facility];  Service: Orthopedics   • PB SHLDR ARTHROSCOP,PART ACROMIOPLAS Right 1/10/2020    Procedure: DECOMPRESSION, SHOULDER, ARTHROSCOPIC - SUBACROMIAL W/LABRAL DEBRIDEMENT, SUPERIOR CAPSULAR RECONSTRUCTION, VALLADARES;  Surgeon: Mei Palacios M.D.;  Location: [facility];  Service: Orthopedics   • BICEPS TENODESIS Right 1/10/2020    Procedure: TENODESIS, BICEPS;  Surgeon: Mei Palacios M.D.;  Location: [facility];  Service: Orthopedics   • ROTATOR CUFF REPAIR Right 1/10/2020    Procedure: REPAIR, ROTATOR CUFF;  Surgeon: Mei Palacios M.D.;  Location: [facility];  Service: Orthopedics   • GASTROSCOPY  3/15/2018    Procedure: GASTROSCOPY - POSS BIOPSY, DILATION, POLYPECTOMY, CONTROL OF HEMORRHAGE;  Surgeon: Edward Rubin M.D.;  Location: [facility];  Service: EUS   • EGD W/ENDOSCOPIC ULTRASOUND  3/15/2018    Procedure: EGD W/ENDOSCOPIC ULTRASOUND;  Surgeon: Edward Rubin M.D.;  Location: [facility];  Service: EUS   • EGD WITH ASP/BX  3/15/2018    Procedure: EGD WITH ASP/BX;  Surgeon: Edward Rubin M.D.;  Location: [facility];  Service: EUS   • GASTROSCOPY N/A 4/13/2017    Procedure: GASTROSCOPY - W/POSS BIOPSY, DILATION, POLYPECTOMY, CONTROL OF HEMORRHAGE;  Surgeon: Edward Rubin M.D.;  Location: [facility];  Service:    • EGD W/ENDOSCOPIC  ULTRASOUND N/A 4/13/2017    Procedure: EGD W/ENDOSCOPIC ULTRASOUND;  Surgeon: Edward Rubin M.D.;  Location: SURGERY [facility];  Service:    • COLONOSCOPY  2016    x 2   • ERCP-DIAGNOSTIC W/BIOPSY  8/2015    several to verify no return of pancreatic CA    • HERNIA REPAIR  7/2014    5 umbilical repaired at same time   • LAPAROSCOPY  1/27/2014    Performed by Shahbaz Jang M.D. at SURGERY [facility]   • WHIPPLE PROCEDURE  1/27/2014    Performed by Shahbaz Jang M.D. at SURGERY [facility]   • NODE DISSECTION  1/27/2014    Performed by Shahbaz Jang M.D. at SURGERY [facility]   • TONSILLECTOMY  2011   • OTHER ORTHOPEDIC SURGERY Left 2009    Nerve damage S/P lumbar surgery-foot broken and pinned.    • LUMBAR LAMINECTOMY DISKECTOMY  2008    L5   • SHOULDER ARTHROSCOPY W/ ROTATOR CUFF REPAIR Right 2006   • CARPAL TUNNEL RELEASE Right 2005   • BLADDER SUSPENSION  2003   • HYSTERECTOMY, TOTAL ABDOMINAL  1990     Allergies   Allergen Reactions   • Bee Anaphylaxis     Wasps   • Clindamycin Hives and Rash     Head to toe rash like chicken pox for 4 months.   • Penicillins Anaphylaxis   • Sulfa Drugs Hives and Itching     Rash hives itching   • Tape Rash     Rash and welt at site   • Codeine Vomiting   • Percocet [Oxycodone-Acetaminophen] Vomiting   • Vicodin [Hydrocodone-Acetaminophen] Vomiting     Outpatient Encounter Medications as of 10/6/2021   Medication Sig Dispense Refill   • Multiple Minerals-Vitamins (CALCIUM-MAGNESIUM-ZINC-D3) Tab Take 1 Tablet by mouth 2 times a day.     • FREESTYLE LITE strip      • albuterol (ACCUNEB) 1.25 MG/3ML nebulizer solution Inhale 1.25 mg by mouth 1 time daily as needed.     • albuterol (VENTOLIN HFA) 108 (90 Base) MCG/ACT Aero Soln inhalation aerosol Inhale 90 mcg by mouth Once PRN.     • fluticasone (FLONASE) 50 MCG/ACT nasal spray Spray 1 Spray in nose 2 times a day.     • HUMALOG 100 UNIT/ML Inject 50 Units under the skin every  day. 40-60 units-insulin pump     • atorvastatin (LIPITOR) 10 MG Tab Take 10 mg by mouth every evening.     • esomeprazole (NEXIUM) 40 MG delayed-release capsule Take 40 mg by mouth every evening.     • B Complex Vitamins (B COMPLEX 1 PO) Take 500 mcg by mouth every day.     • ibuprofen (MOTRIN) 400 MG Tab Take 800 mg by mouth every 8 hours as needed.     • Probiotic Product (PROBIOTIC ADVANCED PO) Take 1 Cap by mouth every evening.     • Multiple Vitamins-Minerals (MULTIVITAMIN ADULT PO) Take 1 Tab by mouth every day.     • Pseudoephedrine-Guaifenesin (MUCINEX D PO) Take 1 Tablet by mouth 1 time a day as needed.     • Pancrelipase, Lip-Prot-Amyl, (CREON) 86039 UNITS Cap DR Particles Take 2 Tabs by mouth 3 times a day, with meals.     • sertraline (ZOLOFT) 100 MG TABS Take 150 mg by mouth every morning.     • [DISCONTINUED] traMADol (ULTRAM) 50 MG Tab  (Patient not taking: Reported on 9/9/2021)     • [DISCONTINUED] Methylsulfonylmethane (MSM) 1000 MG Tab Take 1,000 mg by mouth every day. (Patient not taking: Reported on 8/4/2021)     • [DISCONTINUED] Ergocalciferol (VITAMIN D2) 400 units Tab Take 800 Units by mouth every evening. (Patient not taking: Reported on 9/9/2021)       No facility-administered encounter medications on file as of 10/6/2021.     Social History     Socioeconomic History   • Marital status: Single     Spouse name: Not on file   • Number of children: Not on file   • Years of education: Not on file   • Highest education level: Not on file   Occupational History   • Not on file   Tobacco Use   • Smoking status: Never Smoker   • Smokeless tobacco: Never Used   Vaping Use   • Vaping Use: Never used   Substance and Sexual Activity   • Alcohol use: Not Currently     Comment: one drink once every few months   • Drug use: Yes     Types: Marijuana     Comment: does use CBD oil topical/with THC occasional orally    • Sexual activity: Not on file   Other Topics Concern   • Not on file   Social History  Narrative   • Not on file     Social Determinants of Health     Financial Resource Strain:    • Difficulty of Paying Living Expenses:    Food Insecurity:    • Worried About Running Out of Food in the Last Year:    • Ran Out of Food in the Last Year:    Transportation Needs:    • Lack of Transportation (Medical):    • Lack of Transportation (Non-Medical):    Physical Activity:    • Days of Exercise per Week:    • Minutes of Exercise per Session:    Stress:    • Feeling of Stress :    Social Connections:    • Frequency of Communication with Friends and Family:    • Frequency of Social Gatherings with Friends and Family:    • Attends Shinto Services:    • Active Member of Clubs or Organizations:    • Attends Club or Organization Meetings:    • Marital Status:    Intimate Partner Violence:    • Fear of Current or Ex-Partner:    • Emotionally Abused:    • Physically Abused:    • Sexually Abused:       Social History     Tobacco Use   Smoking Status Never Smoker   Smokeless Tobacco Never Used     Social History     Substance and Sexual Activity   Alcohol Use Not Currently    Comment: one drink once every few months     Social History     Substance and Sexual Activity   Drug Use Yes   • Types: Marijuana    Comment: does use CBD oil topical/with THC occasional orally         Family History   Problem Relation Age of Onset   • Lung Cancer Mother    • Dementia Father        Review of Systems   Constitutional: Positive for malaise/fatigue.   HENT: Positive for sinus pain.    Respiratory: Positive for shortness of breath.    Gastrointestinal: Positive for abdominal pain and diarrhea.        Change of appitite   Genitourinary: Positive for frequency.   Musculoskeletal: Positive for falls.        Muscle weakness   Neurological: Positive for dizziness.   Endo/Heme/Allergies: Positive for environmental allergies.   All other systems reviewed and are negative.       Objective:   /74 (BP Location: Right arm, Patient Position:  "Sitting, BP Cuff Size: Adult)   Pulse 96   Temp 36.5 °C (97.7 °F) (Temporal)   Ht 1.549 m (5' 1\")   Wt 68.4 kg (150 lb 12.8 oz)   SpO2 92%   BMI 28.49 kg/m²     Physical Exam  Vitals and nursing note reviewed.   Constitutional:       Appearance: Normal appearance.   HENT:      Head: Normocephalic and atraumatic.      Nose: Nose normal.      Mouth/Throat:      Mouth: Mucous membranes are moist.   Eyes:      Extraocular Movements: Extraocular movements intact.      Conjunctiva/sclera: Conjunctivae normal.      Pupils: Pupils are equal, round, and reactive to light.   Cardiovascular:      Rate and Rhythm: Normal rate and regular rhythm.      Pulses: Normal pulses.      Heart sounds: Normal heart sounds.   Pulmonary:      Effort: Pulmonary effort is normal.      Breath sounds: Normal breath sounds.   Abdominal:      General: Abdomen is flat. Bowel sounds are normal.      Palpations: Abdomen is soft.      Tenderness: There is abdominal tenderness.      Comments: Slight right upper quadrant discomfort  Her incision is clean, dry, and intact and no obvious hernia   Musculoskeletal:      Cervical back: Normal range of motion and neck supple.   Neurological:      Mental Status: She is alert.         Labs:  Results for THONY ZUÑIGA (MRN [id number]) as of 10/6/2021 06:53   Ref. Range 8/23/2021 09:35   Hemoglobin Latest Ref Range: 12.0 - 16.0 g/dL 13.9   Sodium Latest Ref Range: 135 - 145 mmol/L 142   Potassium Latest Ref Range: 3.6 - 5.5 mmol/L 3.3 (L)   Chloride Latest Ref Range: 96 - 112 mmol/L 106   Co2 Latest Ref Range: 20 - 33 mmol/L 23   Anion Gap Latest Ref Range: 7.0 - 16.0  13.0   Glucose Latest Ref Range: 65 - 99 mg/dL 154 (H)   Bun Latest Ref Range: 8 - 22 mg/dL 12   Creatinine Latest Ref Range: 0.50 - 1.40 mg/dL 0.33 (L)   GFR If  Latest Ref Range: >60 mL/min/1.73 m 2 >60   GFR If Non  Latest Ref Range: >60 mL/min/1.73 m 2 >60   Calcium Latest Ref Range: 8.5 - 10.5 mg/dL " 8.6   AST(SGOT) Latest Ref Range: 12 - 45 U/L 56 (H)   ALT(SGPT) Latest Ref Range: 2 - 50 U/L 67 (H)   Alkaline Phosphatase Latest Ref Range: 30 - 99 U/L 410 (H)   Total Bilirubin Latest Ref Range: 0.1 - 1.5 mg/dL 0.4   Albumin Latest Ref Range: 3.2 - 4.9 g/dL 3.3   Total Protein Latest Ref Range: 6.0 - 8.2 g/dL 6.3   Globulin Latest Ref Range: 1.9 - 3.5 g/dL 3.0   A-G Ratio Latest Units: g/dL 1.1   Glycohemoglobin Latest Ref Range: 4.0 - 5.6 % 6.5 (H)   Estim. Avg Glu Latest Units: mg/dL 140   Cholesterol,Tot Latest Ref Range: 100 - 199 mg/dL 117   Triglycerides Latest Ref Range: 0 - 149 mg/dL 46   HDL Latest Ref Range: >=40 mg/dL 42   LDL Latest Ref Range: <100 mg/dL 66   Micro Alb Creat Ratio Latest Ref Range: 0 - 30 mg/g 23   Creatinine, Urine Latest Units: mg/dL 98.86   Microalbumin, Urine Random Latest Units: mg/dL 2.3       Imagin21 MRI     Per my read,     IMPRESSION:     1.  Development of hypervascular mass within the right lobe the liver at the junction of hepatic segments 5 and 8 and having restricted diffusion     2.  This is most suspicious for a metastasis although could be intrahepatic cholangiocarcinoma.     3.  There is associated localized biliary ductal dilation peripheral to the mass     4.  Changes consistent with prior Whipple procedure with associated pneumobilia      Pathology: 14    FINAL DIAGNOSIS:     A. Celiac nodes:          Three benign lymph nodes (0/3).   B. Gallbladder:          Benign gallbladder with mild chronic cholecystitis,           cholesterolosis, and cholelithiasis.   C. Common hepatic duct margin:          Cauterized bile duct wall with denudation/ulceration of the           surface epithelium, inflammation, and reactive fibrosis.          No malignancy identified.   D. Whipple:          Well-differentiated neuroendocrine tumor measuring 1.9 cm in           maximal dimension (see comment).          Extension into the peripancreatic adipose tissue is  identified.          Tumor focally involves the uncinate margin.          Tumor with associated fibrosis extends less than 1mm from the           intrapancreatic portion of the common bile duct.          Chronic pancreatitis with fibrosis is present at the parenchymal           margin.   E. Antrum:          Portion of benign gastric wall and duodenum.          One benign lymph node identified in the attached adipose tissue           (0/1).     Synoptic Report for Pancreas (Neuroendocrine Tumor):          -Specimen: Head of pancreas, duodenum, common bile duct,         gallbladder, and portion of stomach.        -Procedure: Pancreaticoduodenectomy (Whipple resection), partial         pancreatectomy.        -Tumor Site: Pancreatic head.        -Tumor Size: 1.9 x 1.5 cm.        -Tumor Focality: Unifocal.        -Histologic Type and Grade: Well-differentiated neuroendocrine         tumor; G2: Intermediate grade (see comment)        -Mitotic Rate: <2 mitoses/10 high-power fields.         Specify mitoses per 10 HPF: 1.6 (8/50 HPFs counted).        -Microscopic Tumor Extension: Tumor invades peripancreatic soft         tissues.        -Margins: Uncinate process (retroperitoneal) margin focally         involved with tumor.         All remaining margins uninvolved by tumor.        -Lymph-Vascular Invasion: Not identified.        -Perineural Invasion: Present.        -Pathologic Staging:         Primary Tumor: pT3         Regional Lymph Nodes: pN1           Number of lymph nodes examined: 11           Number of lymph nodes involved: 2        -Distant Metastasis: Not applicable.        -Ancillary Studies: Ki-67 labeling index 3%-20% range (8% 55 cells         counted).     Procedures: 1/27/14    1.  Exploratory laparoscopy/staging laparoscopy procedure.  2.  Open laparotomy procedure.  3.  Standard pancreaticoduodenectomy procedure.  4.  Celiac portal node dissection.  5.  Omental flap.  6.  Intraoperative ultrasound.  7.   Vein repair done by Dr. Doty.    Diagnosis:     1. Malignant neoplasm of head of pancreas (CMS-HCC)  IR-LIVER BX PROCEDURE    GLUCAGON PLASMA    5-HYDROXYINDOLACETIC ACID (5-HIAA)   2. Neoplasm of uncertain behavior of liver  IR-LIVER BX PROCEDURE    GLUCAGON PLASMA    5-HYDROXYINDOLACETIC ACID (5-HIAA)   3. Abdominal pain, unspecified abdominal location  IR-LIVER BX PROCEDURE    GLUCAGON PLASMA    5-HYDROXYINDOLACETIC ACID (5-HIAA)   4. Abnormal MRI of abdomen  IR-LIVER BX PROCEDURE    GLUCAGON PLASMA    5-HYDROXYINDOLACETIC ACID (5-HIAA)   5. Metastatic malignant neuroendocrine tumor to liver (HCC)             Medical Decision Making:  Today's Assessment / Status / Plan:     In light of the present findings, the patient may have 1 of 3 things, HCC, cholangiocarcinoma, or metastatic pancreatic neuroendocrine tumor.  Most likely is the pancreatic neuroendocrine tumor.  What is unusual is the patient is suffering from hyperglycemia that is difficult to control so I am recommending the patient get a glucagon level of 5 HIAA level.  She is presently on an insulin pump.  This occurred approximately 2 to 3 years ago which would coincide with possibly recurrence.  The patient is also going to be sent for a liver biopsy and that will guide her next steps.  I did stress to her that no matter what it is we will be able to address it surgically.    I, Dr. Jang have entered, reviewed and confirmed the above diagnoses related to this patient on this date of service, 10/6/2021  6:48 AM.    She agreed and verbalized her agreement and understanding with the current plan. I answered all questions and concerns she has at this time and advised her to call at any time in the interim with questions or concerns in regards to her care.    Thank you for allowing me to participate in her care, I will continue to follow closely.       Please note that this dictation was created using voice recognition software. I have made every  reasonable attempt to correct obvious errors, but I expect that there are errors of grammar and possibly content that I did not discover before finalizing the note.     Thank you for this consultation and allowing me to participate in your patient's care. If I can be of further service please contact my office.

## 2021-10-13 ENCOUNTER — APPOINTMENT (OUTPATIENT)
Dept: RADIOLOGY | Facility: MEDICAL CENTER | Age: 66
End: 2021-10-13
Attending: INTERNAL MEDICINE
Payer: MEDICARE

## 2021-10-13 ENCOUNTER — HOSPITAL ENCOUNTER (OUTPATIENT)
Facility: MEDICAL CENTER | Age: 66
End: 2021-10-13
Attending: INTERNAL MEDICINE | Admitting: RADIOLOGY
Payer: MEDICARE

## 2021-10-13 ENCOUNTER — HOSPITAL ENCOUNTER (OUTPATIENT)
Facility: MEDICAL CENTER | Age: 66
End: 2021-10-13
Attending: SURGERY
Payer: MEDICARE

## 2021-10-13 VITALS
WEIGHT: 151.46 LBS | HEART RATE: 76 BPM | RESPIRATION RATE: 18 BRPM | OXYGEN SATURATION: 95 % | TEMPERATURE: 97.4 F | DIASTOLIC BLOOD PRESSURE: 61 MMHG | HEIGHT: 61 IN | SYSTOLIC BLOOD PRESSURE: 113 MMHG | BODY MASS INDEX: 28.6 KG/M2

## 2021-10-13 DIAGNOSIS — R93.5 ABNORMAL MRI OF THE ABDOMEN: ICD-10-CM

## 2021-10-13 DIAGNOSIS — R79.89 ABNORMAL LIVER FUNCTION TESTS: ICD-10-CM

## 2021-10-13 LAB
ERYTHROCYTE [DISTWIDTH] IN BLOOD BY AUTOMATED COUNT: 47.6 FL (ref 35.9–50)
EXTERNAL QUALITY CONTROL: NORMAL
GLUCOSE BLD-MCNC: 107 MG/DL (ref 65–99)
GLUCOSE BLD-MCNC: 92 MG/DL (ref 65–99)
HCT VFR BLD AUTO: 40.9 % (ref 37–47)
HGB BLD-MCNC: 13.7 G/DL (ref 12–16)
INR PPP: 1.02 (ref 0.87–1.13)
MCH RBC QN AUTO: 30.4 PG (ref 27–33)
MCHC RBC AUTO-ENTMCNC: 33.5 G/DL (ref 33.6–35)
MCV RBC AUTO: 90.9 FL (ref 81.4–97.8)
PATHOLOGY CONSULT NOTE: NORMAL
PLATELET # BLD AUTO: 171 K/UL (ref 164–446)
PMV BLD AUTO: 10.1 FL (ref 9–12.9)
PROTHROMBIN TIME: 13.1 SEC (ref 12–14.6)
RBC # BLD AUTO: 4.5 M/UL (ref 4.2–5.4)
SARS-COV+SARS-COV-2 AG RESP QL IA.RAPID: NEGATIVE
WBC # BLD AUTO: 5.2 K/UL (ref 4.8–10.8)

## 2021-10-13 PROCEDURE — 82542 COL CHROMOTOGRAPHY QUAL/QUAN: CPT

## 2021-10-13 PROCEDURE — 82962 GLUCOSE BLOOD TEST: CPT

## 2021-10-13 PROCEDURE — 160002 HCHG RECOVERY MINUTES (STAT)

## 2021-10-13 PROCEDURE — 82943 ASSAY OF GLUCAGON: CPT

## 2021-10-13 PROCEDURE — 85027 COMPLETE CBC AUTOMATED: CPT

## 2021-10-13 PROCEDURE — 88341 IMHCHEM/IMCYTCHM EA ADD ANTB: CPT | Mod: 91

## 2021-10-13 PROCEDURE — 88307 TISSUE EXAM BY PATHOLOGIST: CPT

## 2021-10-13 PROCEDURE — 700111 HCHG RX REV CODE 636 W/ 250 OVERRIDE (IP): Performed by: RADIOLOGY

## 2021-10-13 PROCEDURE — 88342 IMHCHEM/IMCYTCHM 1ST ANTB: CPT

## 2021-10-13 PROCEDURE — 85610 PROTHROMBIN TIME: CPT

## 2021-10-13 PROCEDURE — 700111 HCHG RX REV CODE 636 W/ 250 OVERRIDE (IP)

## 2021-10-13 PROCEDURE — 99153 MOD SED SAME PHYS/QHP EA: CPT

## 2021-10-13 PROCEDURE — 87426 SARSCOV CORONAVIRUS AG IA: CPT | Performed by: RADIOLOGY

## 2021-10-13 RX ORDER — MIDAZOLAM HYDROCHLORIDE 1 MG/ML
INJECTION INTRAMUSCULAR; INTRAVENOUS
Status: COMPLETED
Start: 2021-10-13 | End: 2021-10-13

## 2021-10-13 RX ORDER — SODIUM CHLORIDE 9 MG/ML
1000 INJECTION, SOLUTION INTRAVENOUS CONTINUOUS
Status: DISCONTINUED | OUTPATIENT
Start: 2021-10-13 | End: 2021-10-13 | Stop reason: HOSPADM

## 2021-10-13 RX ORDER — ONDANSETRON 2 MG/ML
4 INJECTION INTRAMUSCULAR; INTRAVENOUS EVERY 8 HOURS PRN
Status: DISCONTINUED | OUTPATIENT
Start: 2021-10-13 | End: 2021-10-13 | Stop reason: HOSPADM

## 2021-10-13 RX ORDER — IBUPROFEN 200 MG
600-800 TABLET ORAL EVERY 8 HOURS PRN
Status: ON HOLD | COMMUNITY
End: 2022-03-04

## 2021-10-13 RX ORDER — NALOXONE HYDROCHLORIDE 0.4 MG/ML
INJECTION, SOLUTION INTRAMUSCULAR; INTRAVENOUS; SUBCUTANEOUS
Status: COMPLETED
Start: 2021-10-13 | End: 2021-10-13

## 2021-10-13 RX ORDER — FLUMAZENIL 0.1 MG/ML
INJECTION INTRAVENOUS
Status: COMPLETED
Start: 2021-10-13 | End: 2021-10-13

## 2021-10-13 RX ORDER — ONDANSETRON 2 MG/ML
4 INJECTION INTRAMUSCULAR; INTRAVENOUS PRN
Status: DISCONTINUED | OUTPATIENT
Start: 2021-10-13 | End: 2021-10-13 | Stop reason: HOSPADM

## 2021-10-13 RX ORDER — PROCHLORPERAZINE 25 MG/1
1 SUPPOSITORY RECTAL CONTINUOUS
Status: ON HOLD | COMMUNITY
End: 2021-11-22

## 2021-10-13 RX ORDER — ONDANSETRON 2 MG/ML
INJECTION INTRAMUSCULAR; INTRAVENOUS
Status: COMPLETED
Start: 2021-10-13 | End: 2021-10-13

## 2021-10-13 RX ORDER — SODIUM CHLORIDE 9 MG/ML
500 INJECTION, SOLUTION INTRAVENOUS
Status: DISCONTINUED | OUTPATIENT
Start: 2021-10-13 | End: 2021-10-13 | Stop reason: HOSPADM

## 2021-10-13 RX ORDER — MIDAZOLAM HYDROCHLORIDE 1 MG/ML
.5-2 INJECTION INTRAMUSCULAR; INTRAVENOUS PRN
Status: DISCONTINUED | OUTPATIENT
Start: 2021-10-13 | End: 2021-10-13 | Stop reason: HOSPADM

## 2021-10-13 RX ADMIN — ONDANSETRON 4 MG: 2 INJECTION INTRAMUSCULAR; INTRAVENOUS at 11:35

## 2021-10-13 RX ADMIN — FENTANYL CITRATE 50 MCG: 50 INJECTION INTRAMUSCULAR; INTRAVENOUS at 11:29

## 2021-10-13 RX ADMIN — MIDAZOLAM HYDROCHLORIDE 2 MG: 1 INJECTION, SOLUTION INTRAMUSCULAR; INTRAVENOUS at 11:29

## 2021-10-13 RX ADMIN — FENTANYL CITRATE 50 MCG: 50 INJECTION, SOLUTION INTRAMUSCULAR; INTRAVENOUS at 11:32

## 2021-10-13 RX ADMIN — FENTANYL CITRATE 25 MCG: 50 INJECTION, SOLUTION INTRAMUSCULAR; INTRAVENOUS at 11:55

## 2021-10-13 RX ADMIN — MIDAZOLAM HYDROCHLORIDE 2 MG: 1 INJECTION INTRAMUSCULAR; INTRAVENOUS at 11:29

## 2021-10-13 RX ADMIN — FENTANYL CITRATE 50 MCG: 50 INJECTION, SOLUTION INTRAMUSCULAR; INTRAVENOUS at 11:29

## 2021-10-13 ASSESSMENT — PAIN DESCRIPTION - PAIN TYPE
TYPE: SURGICAL PAIN
TYPE: CHRONIC PAIN

## 2021-10-13 ASSESSMENT — FIBROSIS 4 INDEX: FIB4 SCORE: 2.38

## 2021-10-13 NOTE — PROGRESS NOTES
Med Rec completed per patient   Allergies reviewed  No ORAL antibiotics in last 30 days    Patient has insulin pump with unknown info

## 2021-10-13 NOTE — DISCHARGE INSTRUCTIONS
ACTIVITY: Rest and take it easy for the first 24 hours.  A responsible adult is recommended to remain with you during that time.  It is normal to feel sleepy.  We encourage you to not do anything that requires balance, judgment or coordination.    MILD FLU-LIKE SYMPTOMS ARE NORMAL. YOU MAY EXPERIENCE GENERALIZED MUSCLE ACHES, THROAT IRRITATION, HEADACHE AND/OR SOME NAUSEA.    FOR 24 HOURS DO NOT:  Drive, operate machinery or run household appliances.  Drink beer or alcoholic beverages.   Make important decisions or sign legal documents.      SPECIAL INSTRUCTIONS: Liver Biopsy, Care After  These instructions give you information on caring for yourself after your procedure. Your doctor may also give you more specific instructions. Call your doctor if you have any problems or questions after your procedure.  What can I expect after the procedure?  After the procedure, it is common to have:  · Pain and soreness where the biopsy was done.  · Bruising around the area where the biopsy was done.  · Sleepiness and be tired for a few days.  Follow these instructions at home:  Medicines  · Take over-the-counter and prescription medicines only as told by your doctor.  · If you were prescribed an antibiotic medicine, take it as told by your doctor. Do not stop taking the antibiotic even if you start to feel better.  · Do not take medicines such as aspirin and ibuprofen. These medicines can thin your blood. Do not take these medicines unless your doctor tells you to take them.  · If you are taking prescription pain medicine, take actions to prevent or treat constipation. Your doctor may recommend that you:  ? Drink enough fluid to keep your pee (urine) clear or pale yellow.  ? Take over-the-counter or prescription medicines.  ? Eat foods that are high in fiber, such as fresh fruits and vegetables, whole grains, and beans.  ? Limit foods that are high in fat and processed sugars, such as fried and sweet foods.  Caring for your  cut  · Follow instructions from your doctor about how to take care of your cuts from surgery (incisions). Make sure you:  ? Wash your hands with soap and water before you change your bandage (dressing). If you cannot use soap and water, use hand .  ? Change your bandage as told by your doctor.  ? Leave stitches (sutures), skin glue, or skin tape (adhesive) strips in place. They may need to stay in place for 2 weeks or longer. If tape strips get loose and curl up, you may trim the loose edges. Do not remove tape strips completely unless your doctor says it is okay.  · Check your cuts every day for signs of infection. Check for:  ? Redness, swelling, or more pain.  ? Fluid or blood.  ? Pus or a bad smell.  ? Warmth.  · Do not take baths, swim, or use a hot tub until your doctor says it is okay to do so.  Activity    · Rest at home for 1-2 days or as told by your doctor.  ? Avoid sitting for a long time without moving. Get up to take short walks every 1-2 hours.  · Return to your normal activities as told by your doctor. Ask what activities are safe for you.  · Do not do these things in the first 24 hours:  ? Drive.  ? Use machinery.  ? Take a bath or shower.  · Do not lift more than 10 pounds (4.5 kg) or play contact sports for the first 2 weeks.  General instructions    · Do not drink alcohol in the first week after the procedure.  · Have someone stay with you for at least 24 hours after the procedure.  · Get your test results. Ask your doctor or the department that is doing the test:  ? When will my results be ready?  ? How will I get my results?  ? What are my treatment options?  ? What other tests do I need?  ? What are my next steps?  · Keep all follow-up visits as told by your doctor. This is important.  Contact a doctor if:  · A cut bleeds and leaves more than just a small spot of blood.  · A cut is red, puffs up (swells), or hurts more than before.  · Fluid or something else comes from a cut.  · A cut  smells bad.  · You have a fever or chills.  Get help right away if:  · You have swelling, bloating, or pain in your belly (abdomen).  · You get dizzy or faint.  · You have a rash.  · You feel sick to your stomach (nauseous) or throw up (vomit).  · You have trouble breathing, feel short of breath, or feel faint.  · Your chest hurts.  · You have problems talking or seeing.  · You have trouble with your balance or moving your arms or legs.  Summary  · After the procedure, it is common to have pain, soreness, bruising, and tiredness.  · Your doctor will tell you how to take care of yourself at home. Change your bandage, take your medicines, and limit your activities as told by your doctor.  · Call your doctor if you have symptoms of infection. Get help right away if your belly swells, your cut bleeds a lot, or you have trouble talking or breathing.    DIET: To avoid nausea, slowly advance diet as tolerated, avoiding spicy or greasy foods for the first day.  Add more substantial food to your diet according to your physician's instructions.   INCREASE FLUIDS AND FIBER TO AVOID CONSTIPATION.    SURGICAL DRESSING/BATHING:   May remove dressing in 24 hours.  May shower. No hot tubs or bathtubs or pools x1 week/    FOLLOW-UP APPOINTMENT:  A follow-up appointment should be arranged with your doctor in 1-2 weeks; call to schedule.    You should CALL YOUR PHYSICIAN if you develop:  Fever greater than 101 degrees F.  Pain not relieved by medication, or persistent nausea or vomiting.  Excessive bleeding (blood soaking through dressing) or unexpected drainage from the wound.  Extreme redness or swelling around the incision site, drainage of pus or foul smelling drainage.  Inability to urinate or empty your bladder within 8 hours.  Problems with breathing or chest pain.    You should call 911 if you develop problems with breathing or chest pain.  If you are unable to contact your doctor or surgical center, you should go to the  nearest emergency room or urgent care center.  Physician's telephone #: Dr. Garcia ([phone])    If any questions arise, call your doctor.  If your doctor is not available, please feel free to call the Surgical Center at [phone]. The Contact Center is open Monday through Friday 7AM to 5PM and may speak to a nurse at [phone], or toll free at [phone].     A registered nurse may call you a few days after your surgery to see how you are doing after your procedure.    MEDICATIONS: Resume taking daily medication.  Take prescribed pain medication with food.  If no medication is prescribed, you may take non-aspirin pain medication if needed.  PAIN MEDICATION CAN BE VERY CONSTIPATING.  Take a stool softener or laxative such as senokot, pericolace, or milk of magnesia if needed.      If your physician has prescribed pain medication that includes Acetaminophen (Tylenol), do not take additional Acetaminophen (Tylenol) while taking the prescribed medication.    Depression / Suicide Risk    As you are discharged from this [facility] facility, it is important to learn how to keep safe from harming yourself.    Recognize the warning signs:  · Abrupt changes in personality, positive or negative- including increase in energy   · Giving away possessions  · Change in eating patterns- significant weight changes-  positive or negative  · Change in sleeping patterns- unable to sleep or sleeping all the time   · Unwillingness or inability to communicate  · Depression  · Unusual sadness, discouragement and loneliness  · Talk of wanting to die  · Neglect of personal appearance   · Rebelliousness- reckless behavior  · Withdrawal from people/activities they love  · Confusion- inability to concentrate     If you or a loved one observes any of these behaviors or has concerns about self-harm, here's what you can do:  · Talk about it- your feelings and reasons for harming yourself  · Remove any means that you might use to  hurt yourself (examples: pills, rope, extension cords, firearm)  · Get professional help from the community (Mental Health, Substance Abuse, psychological counseling)  · Do not be alone:Call your Safe Contact- someone whom you trust who will be there for you.  · Call your local CRISIS HOTLINE 159-6279 or 870-954-4845  · Call your local Children's Mobile Crisis Response Team [place] (587) 763-3197 or www.[employer]  · Call the toll free National Suicide Prevention Hotlines   · National Suicide Prevention Lifeline 393-032-VFOE (3282)  · National Hope Line Network 800-SUICIDE (764-0037)

## 2021-10-13 NOTE — OR NURSING
1207-The biopsy puncture site, under right breast soft, no discoloration. Gauze dressing CDI.    1215-The biopsy puncture site, under right breast soft, no discoloration. Gauze dressing CDI.    1230-The biopsy puncture site, under right breast soft, no discoloration. Gauze dressing CDI.    1245-The biopsy puncture site, under right breast soft, no discoloration. Gauze dressing CDI.      1300-The biopsy puncture site, under right breast soft, no discoloration. Gauze dressing CDI.

## 2021-10-13 NOTE — OR NURSING
1330-The liver biopsy site under right breast is soft, no discoloration. Gauze dressing CDI.    Family updated by phone.    1350-The liver biopsy site under right breast is soft, no discoloration. Gauze dressing CDI.

## 2021-10-13 NOTE — OR SURGEON
Immediate Post- Operative Note    PostOp Diagnosis: LIVER MASS 2.8 seg 5/8      Procedure(s): CT GUIDED LIVER BIOPSY    20G CORES X 6 IN FORMALIN.     CT GUIDED, HOWEVER, LESION WAS BEST SEEN ON MRI    Estimated Blood Loss: <2 CC        Complications: NONE            10/13/2021     12:06 PM     Mart Garcia M.D.

## 2021-10-13 NOTE — OR NURSING
Pt arrived to PACU II at 1402. Pt aa/ox4, VSS. Discharge criteria met. Denies pain at this time. Rt lower chest with dressing, clean, dry, and intact. Pt changed into clothing with assistance. Discharge instructions given; pt and family verbalized understanding and questions answered.  IV removed, tip intact. Will follow-up with Dr. Garcia and Laine. Pt discharged home and escorted via w/c with CNA in stable condition.

## 2021-10-13 NOTE — H&P
History and Physical    Date: 10/13/2021    PCP: Brigitte Salmon D.O.      CC: LIVER NODULE     HPI: This is a 65 y.o. female who is presenting FOR CT GUIDED LIVER BX FOR 2.8 CM LIVER NODULE SEEN ON MRI    Past Medical History:   Diagnosis Date   • Adverse effect of anesthesia     Pt was very sick after surgery on 8/4/2021 under a local    • Anesthesia     PONV/ slow to emerge per pt   • Arthritis 4/7/17    Bilateral shoulders and both hands-osteo   • ASTHMA     allergy induced-uses inhalers prn/flonase   • Blood clotting disorder (HCC) dx 10/2016    Right eye retina-Treated with laser and injections   • Bowel habit changes     Diarrhea chronic/ fatty stools at times   • Bronchitis 12/2017   • Cancer (HCC) 12/2013-dx    pancreatic CA. Whipple procedure 1/2014   • Carotid artery plaque 2016    per ultrasound   • Cataract 10/11/2021    Bilateral-early stages   • Colitis since age 14   • Delayed emergence from general anesthesia    • Diabetes (HCC) 2014    post whipple procedure/ uses humalog, insuliln pump   • Heart burn     treated with nexium   • Heart murmur     since birth, slight   • High cholesterol    • Indigestion     takes creon digestive enzymes   • MRSA (methicillin resistant Staphylococcus aureus) 11/2016    Left hand-cleared    • MRSA (methicillin resistant Staphylococcus aureus) 1/30/2017    Back of left thigh-cleared.   • Neuropathy 07/2021    viky hands and feet   • Osteoporosis    • Pneumonia 2012    gets a pneumovax   • PONV (postoperative nausea and vomiting)     Pt was very sick after surgery on 8/4/2021   • Psychiatric problem     depression/ anxiety    • Snoring     sinus issues   • Unspecified urinary incontinence     uses pad   • Urinary bladder disorder     incontinence       Past Surgical History:   Procedure Laterality Date   • PB INCISE FINGER TENDON SHEATH Right 8/4/2021    Procedure: RELEASE, TRIGGER FINGER - THUMB, A1 UNRULY.;  Surgeon: Drew Duong M.D.;  Location: SURGERY  [facility];  Service: Orthopedics   • PB SHLDR ARTHROSCOP,PART ACROMIOPLAS Right 1/10/2020    Procedure: DECOMPRESSION, SHOULDER, ARTHROSCOPIC - SUBACROMIAL W/LABRAL DEBRIDEMENT, SUPERIOR CAPSULAR RECONSTRUCTION, VALLADARES;  Surgeon: Mei Palacios M.D.;  Location: [facility];  Service: Orthopedics   • BICEPS TENODESIS Right 1/10/2020    Procedure: TENODESIS, BICEPS;  Surgeon: Mei Palacios M.D.;  Location: [facility];  Service: Orthopedics   • ROTATOR CUFF REPAIR Right 1/10/2020    Procedure: REPAIR, ROTATOR CUFF;  Surgeon: Mei Palacios M.D.;  Location: [facility];  Service: Orthopedics   • GASTROSCOPY  3/15/2018    Procedure: GASTROSCOPY - POSS BIOPSY, DILATION, POLYPECTOMY, CONTROL OF HEMORRHAGE;  Surgeon: Edward Rubin M.D.;  Location: [facility];  Service: EUS   • EGD W/ENDOSCOPIC ULTRASOUND  3/15/2018    Procedure: EGD W/ENDOSCOPIC ULTRASOUND;  Surgeon: Edward Rubin M.D.;  Location: [facility];  Service: EUS   • EGD WITH ASP/BX  3/15/2018    Procedure: EGD WITH ASP/BX;  Surgeon: Edward Rubin M.D.;  Location: [facility];  Service: EUS   • GASTROSCOPY N/A 4/13/2017    Procedure: GASTROSCOPY - W/POSS BIOPSY, DILATION, POLYPECTOMY, CONTROL OF HEMORRHAGE;  Surgeon: Edward Rubin M.D.;  Location: [facility];  Service:    • EGD W/ENDOSCOPIC ULTRASOUND N/A 4/13/2017    Procedure: EGD W/ENDOSCOPIC ULTRASOUND;  Surgeon: Edward Rubin M.D.;  Location: [facility];  Service:    • COLONOSCOPY  2016    x 2   • ERCP-DIAGNOSTIC W/BIOPSY  8/2015    several to verify no return of pancreatic CA    • HERNIA REPAIR  7/2014    5 umbilical repaired at same time   • LAPAROSCOPY  1/27/2014    Performed by Shahbaz Jang M.D. at [facility]   • WHIPPLE PROCEDURE  1/27/2014    Performed by Shahbaz Jang M.D. at [facility]   • NODE DISSECTION  1/27/2014     Performed by Shahbaz Jang M.D. at SURGERY [facility]   • TONSILLECTOMY  2011   • OTHER ORTHOPEDIC SURGERY Left 2009    Nerve damage S/P lumbar surgery-foot broken and pinned.    • LUMBAR LAMINECTOMY DISKECTOMY  2008    L5   • SHOULDER ARTHROSCOPY W/ ROTATOR CUFF REPAIR Right 2006   • CARPAL TUNNEL RELEASE Right 2005   • BLADDER SUSPENSION  2003   • HYSTERECTOMY, TOTAL ABDOMINAL  1990       Current Facility-Administered Medications   Medication Dose Route Frequency Provider Last Rate Last Admin   • NS infusion 1,000 mL  1,000 mL Intravenous Continuous Prem Emanuel M.D.            Social History     Socioeconomic History   • Marital status: Single     Spouse name: Not on file   • Number of children: Not on file   • Years of education: Not on file   • Highest education level: Not on file   Occupational History   • Not on file   Tobacco Use   • Smoking status: Never Smoker   • Smokeless tobacco: Never Used   Vaping Use   • Vaping Use: Never used   Substance and Sexual Activity   • Alcohol use: Not Currently   • Drug use: Yes     Types: Marijuana     Comment: does use CBD oil topical/with THC occasional orally    • Sexual activity: Not on file   Other Topics Concern   • Not on file   Social History Narrative   • Not on file     Social Determinants of Health     Financial Resource Strain:    • Difficulty of Paying Living Expenses:    Food Insecurity:    • Worried About Running Out of Food in the Last Year:    • Ran Out of Food in the Last Year:    Transportation Needs:    • Lack of Transportation (Medical):    • Lack of Transportation (Non-Medical):    Physical Activity:    • Days of Exercise per Week:    • Minutes of Exercise per Session:    Stress:    • Feeling of Stress :    Social Connections:    • Frequency of Communication with Friends and Family:    • Frequency of Social Gatherings with Friends and Family:    • Attends [employer] Services:    • Active Member of Clubs or Organizations:    •  Attends Club or Organization Meetings:    • Marital Status:    Intimate Partner Violence:    • Fear of Current or Ex-Partner:    • Emotionally Abused:    • Physically Abused:    • Sexually Abused:        Family History   Problem Relation Age of Onset   • Lung Cancer Mother    • Dementia Father        Allergies:  Bee, Clindamycin, Penicillins, Sulfa drugs, Tape, Codeine, Percocet [oxycodone-acetaminophen], and Vicodin [hydrocodone-acetaminophen]    Review of Systems:  Negative except for PT STATES INSULIN PUMP IS DUE FOR NEW BATTERIES    Physical Exam  Constitutional:       Appearance: Normal appearance.   HENT:      Head: Normocephalic and atraumatic.   Cardiovascular:      Rate and Rhythm: Normal rate.   Pulmonary:      Effort: Pulmonary effort is normal.   Skin:     General: Skin is dry.   Neurological:      Mental Status: She is alert.   Psychiatric:         Mood and Affect: Mood normal.         Behavior: Behavior normal.         Vital Signs  Blood Pressure : 126/70   Temperature: 36.1 °C (96.9 °F)   Pulse: 74   Respiration: 18   Pulse Oximetry: 97 %        Labs:                    Radiology:  No orders to display             Assessment and Plan:This is a 65 y.o. FOR CT GUIDED LIVER BX FOR 2.8 CM LIVER NODULE SEEN ON MRI

## 2021-10-16 ENCOUNTER — APPOINTMENT (OUTPATIENT)
Dept: RADIOLOGY | Facility: MEDICAL CENTER | Age: 66
End: 2021-10-16
Attending: EMERGENCY MEDICINE
Payer: MEDICARE

## 2021-10-16 ENCOUNTER — HOSPITAL ENCOUNTER (EMERGENCY)
Facility: MEDICAL CENTER | Age: 66
End: 2021-10-16
Attending: EMERGENCY MEDICINE
Payer: MEDICARE

## 2021-10-16 VITALS
WEIGHT: 152.12 LBS | TEMPERATURE: 99.4 F | BODY MASS INDEX: 28.72 KG/M2 | HEART RATE: 94 BPM | DIASTOLIC BLOOD PRESSURE: 88 MMHG | SYSTOLIC BLOOD PRESSURE: 137 MMHG | OXYGEN SATURATION: 96 % | RESPIRATION RATE: 16 BRPM | HEIGHT: 61 IN

## 2021-10-16 DIAGNOSIS — N12 PYELONEPHRITIS: ICD-10-CM

## 2021-10-16 LAB
ALBUMIN SERPL BCP-MCNC: 3 G/DL (ref 3.2–4.9)
ALBUMIN/GLOB SERPL: 0.9 G/DL
ALP SERPL-CCNC: 411 U/L (ref 30–99)
ALT SERPL-CCNC: 89 U/L (ref 2–50)
ANION GAP SERPL CALC-SCNC: 10 MMOL/L (ref 7–16)
APPEARANCE UR: ABNORMAL
AST SERPL-CCNC: 71 U/L (ref 12–45)
BACTERIA #/AREA URNS HPF: ABNORMAL /HPF
BASOPHILS # BLD AUTO: 0.4 % (ref 0–1.8)
BASOPHILS # BLD: 0.04 K/UL (ref 0–0.12)
BILIRUB SERPL-MCNC: 1.2 MG/DL (ref 0.1–1.5)
BILIRUB UR QL STRIP.AUTO: ABNORMAL
BUN SERPL-MCNC: 10 MG/DL (ref 8–22)
CALCIUM SERPL-MCNC: 8.6 MG/DL (ref 8.4–10.2)
CHLORIDE SERPL-SCNC: 99 MMOL/L (ref 96–112)
CO2 SERPL-SCNC: 21 MMOL/L (ref 20–33)
COLOR UR: ABNORMAL
CREAT SERPL-MCNC: 0.47 MG/DL (ref 0.5–1.4)
EOSINOPHIL # BLD AUTO: 0.03 K/UL (ref 0–0.51)
EOSINOPHIL NFR BLD: 0.3 % (ref 0–6.9)
EPI CELLS #/AREA URNS HPF: ABNORMAL /HPF
ERYTHROCYTE [DISTWIDTH] IN BLOOD BY AUTOMATED COUNT: 47.8 FL (ref 35.9–50)
GLOBULIN SER CALC-MCNC: 3.3 G/DL (ref 1.9–3.5)
GLUCOSE SERPL-MCNC: 274 MG/DL (ref 65–99)
GLUCOSE UR STRIP.AUTO-MCNC: 500 MG/DL
HCT VFR BLD AUTO: 40.4 % (ref 37–47)
HGB BLD-MCNC: 13.3 G/DL (ref 12–16)
IMM GRANULOCYTES # BLD AUTO: 0.05 K/UL (ref 0–0.11)
IMM GRANULOCYTES NFR BLD AUTO: 0.5 % (ref 0–0.9)
KETONES UR STRIP.AUTO-MCNC: 15 MG/DL
LEUKOCYTE ESTERASE UR QL STRIP.AUTO: ABNORMAL
LIPASE SERPL-CCNC: 5 U/L (ref 7–58)
LYMPHOCYTES # BLD AUTO: 1.29 K/UL (ref 1–4.8)
LYMPHOCYTES NFR BLD: 12.8 % (ref 22–41)
MCH RBC QN AUTO: 30.7 PG (ref 27–33)
MCHC RBC AUTO-ENTMCNC: 32.9 G/DL (ref 33.6–35)
MCV RBC AUTO: 93.3 FL (ref 81.4–97.8)
MICRO URNS: ABNORMAL
MONOCYTES # BLD AUTO: 0.57 K/UL (ref 0–0.85)
MONOCYTES NFR BLD AUTO: 5.6 % (ref 0–13.4)
MUCOUS THREADS #/AREA URNS HPF: ABNORMAL /HPF
NEUTROPHILS # BLD AUTO: 8.11 K/UL (ref 2–7.15)
NEUTROPHILS NFR BLD: 80.4 % (ref 44–72)
NITRITE UR QL STRIP.AUTO: POSITIVE
NRBC # BLD AUTO: 0 K/UL
NRBC BLD-RTO: 0 /100 WBC
PH UR STRIP.AUTO: 5.5 [PH] (ref 5–8)
PLATELET # BLD AUTO: 148 K/UL (ref 164–446)
PMV BLD AUTO: 10.4 FL (ref 9–12.9)
POTASSIUM SERPL-SCNC: 3.4 MMOL/L (ref 3.6–5.5)
PROT SERPL-MCNC: 6.3 G/DL (ref 6–8.2)
PROT UR QL STRIP: NEGATIVE MG/DL
RBC # BLD AUTO: 4.33 M/UL (ref 4.2–5.4)
RBC # URNS HPF: ABNORMAL /HPF
RBC UR QL AUTO: NEGATIVE
SODIUM SERPL-SCNC: 130 MMOL/L (ref 135–145)
SP GR UR STRIP.AUTO: 1.02
UNIDENT CRYS URNS QL MICRO: ABNORMAL /HPF
WBC # BLD AUTO: 10.1 K/UL (ref 4.8–10.8)
WBC #/AREA URNS HPF: ABNORMAL /HPF

## 2021-10-16 PROCEDURE — 36415 COLL VENOUS BLD VENIPUNCTURE: CPT

## 2021-10-16 PROCEDURE — 87086 URINE CULTURE/COLONY COUNT: CPT

## 2021-10-16 PROCEDURE — 99285 EMERGENCY DEPT VISIT HI MDM: CPT

## 2021-10-16 PROCEDURE — 96374 THER/PROPH/DIAG INJ IV PUSH: CPT | Mod: XU

## 2021-10-16 PROCEDURE — 81001 URINALYSIS AUTO W/SCOPE: CPT

## 2021-10-16 PROCEDURE — 83690 ASSAY OF LIPASE: CPT

## 2021-10-16 PROCEDURE — 700111 HCHG RX REV CODE 636 W/ 250 OVERRIDE (IP): Performed by: EMERGENCY MEDICINE

## 2021-10-16 PROCEDURE — 87186 SC STD MICRODIL/AGAR DIL: CPT

## 2021-10-16 PROCEDURE — 700102 HCHG RX REV CODE 250 W/ 637 OVERRIDE(OP): Performed by: EMERGENCY MEDICINE

## 2021-10-16 PROCEDURE — A9270 NON-COVERED ITEM OR SERVICE: HCPCS | Performed by: EMERGENCY MEDICINE

## 2021-10-16 PROCEDURE — 85025 COMPLETE CBC W/AUTO DIFF WBC: CPT

## 2021-10-16 PROCEDURE — 74177 CT ABD & PELVIS W/CONTRAST: CPT | Mod: ME

## 2021-10-16 PROCEDURE — 700117 HCHG RX CONTRAST REV CODE 255: Performed by: EMERGENCY MEDICINE

## 2021-10-16 PROCEDURE — 87077 CULTURE AEROBIC IDENTIFY: CPT

## 2021-10-16 PROCEDURE — 80053 COMPREHEN METABOLIC PANEL: CPT

## 2021-10-16 RX ORDER — ACETAMINOPHEN 325 MG/1
650 TABLET ORAL ONCE
Status: COMPLETED | OUTPATIENT
Start: 2021-10-16 | End: 2021-10-16

## 2021-10-16 RX ORDER — POTASSIUM CHLORIDE 20 MEQ/1
40 TABLET, EXTENDED RELEASE ORAL ONCE
Status: COMPLETED | OUTPATIENT
Start: 2021-10-16 | End: 2021-10-16

## 2021-10-16 RX ORDER — CEFTRIAXONE 1 G/1
1 INJECTION, POWDER, FOR SOLUTION INTRAMUSCULAR; INTRAVENOUS ONCE
Status: COMPLETED | OUTPATIENT
Start: 2021-10-16 | End: 2021-10-16

## 2021-10-16 RX ORDER — CEFDINIR 300 MG/1
300 CAPSULE ORAL 2 TIMES DAILY
Qty: 20 CAPSULE | Refills: 0 | Status: SHIPPED | OUTPATIENT
Start: 2021-10-16 | End: 2022-01-11

## 2021-10-16 RX ADMIN — CEFTRIAXONE SODIUM 1 G: 1 INJECTION, POWDER, FOR SOLUTION INTRAMUSCULAR; INTRAVENOUS at 18:30

## 2021-10-16 RX ADMIN — IOHEXOL 100 ML: 350 INJECTION, SOLUTION INTRAVENOUS at 17:55

## 2021-10-16 RX ADMIN — ACETAMINOPHEN 650 MG: 325 TABLET ORAL at 20:01

## 2021-10-16 RX ADMIN — POTASSIUM CHLORIDE 40 MEQ: 1500 TABLET, EXTENDED RELEASE ORAL at 20:02

## 2021-10-16 ASSESSMENT — FIBROSIS 4 INDEX: FIB4 SCORE: 2.6

## 2021-10-16 ASSESSMENT — PAIN DESCRIPTION - PAIN TYPE: TYPE: ACUTE PAIN

## 2021-10-16 NOTE — ED PROVIDER NOTES
"ED Provider Note    Scribed for Zoila Pappas M.D. by Melva Mena. 10/16/2021, 4:24 PM.    Primary care provider: Brigitte Salmon D.O.  Means of arrival: Walk-In  History obtained from: patient  History limited by: None    CHIEF COMPLAINT  Chief Complaint   Patient presents with   • Abdominal Pain   • Fever     HPI  Denise Gaines is a 65 y.o. female who presents to the Emergency Department for gradually worsening abdominal pain over the last 3 days. The pain is sharp and localized in the right lower quadrant. There is occasional radiation of pain to the upper right quadrant and right back. She describes the feeling as having \"constant gas\". She notes that she additionally endured an associated shortness of breath, fever (101 °F), dehydration, increased urinary frequency, and \"flaquito\" appearing urine. She adds that she has experienced flaquito urine due to history of pancreatic cancer. The patient underwent a liver biopsy on 10/13/2021. Two masses were found. Since her discharge, she has felt increasingly tired, with the worsening abdominal pain. The abdominal pain is greatly exacerbated with movement and sneezing. Her pertinent medical history and daily medications are noted below. She denies any cough, chills, or flu-like symptoms.     REVIEW OF SYSTEMS  Pertinent positives include abdominal pain that radiates to the back, shortness of breath, fever (101 °F), dehydration, increased urinary frequency, and \"flaquito\" appearing urine. Pertinent negatives include no cough, chills, or flu-like symptoms.  All other systems reviewed and negative.    PAST MEDICAL HISTORY   has a past medical history of Adverse effect of anesthesia, Anesthesia, Arthritis (4/7/17), ASTHMA, Blood clotting disorder (HCC) (dx 10/2016), Bowel habit changes, Bronchitis (12/2017), Cancer (HCC) (12/2013-dx), Carotid artery plaque (2016), Cataract (10/11/2021), Colitis (since age 14), Delayed emergence from general anesthesia, Diabetes (HCC) " (2014), Heart burn, Heart murmur, High cholesterol, Indigestion, MRSA (methicillin resistant Staphylococcus aureus) (11/2016), MRSA (methicillin resistant Staphylococcus aureus) (1/30/2017), Neuropathy (07/2021), Osteoporosis, Pneumonia (2012), PONV (postoperative nausea and vomiting), Psychiatric problem, Snoring, Unspecified urinary incontinence, and Urinary bladder disorder.    SURGICAL HISTORY   has a past surgical history that includes hysterectomy, total abdominal (1990); laparoscopy (1/27/2014); whipple procedure (1/27/2014); node dissection (1/27/2014); bladder suspension (2003); tonsillectomy (2011); colonoscopy (2016); ercp-diagnostic w/biopsy (8/2015); gastroscopy (N/A, 4/13/2017); egd w/endoscopic ultrasound (N/A, 4/13/2017); gastroscopy (3/15/2018); egd w/endoscopic ultrasound (3/15/2018); egd with asp/bx (3/15/2018); hernia repair (7/2014); lumbar laminectomy diskectomy (2008); shldr arthroscop,part acromioplas (Right, 1/10/2020); biceps tenodesis (Right, 1/10/2020); pb incise finger tendon sheath (Right, 8/4/2021); other orthopedic surgery (Left, 2009); carpal tunnel release (Right, 2005); shoulder arthroscopy w/ rotator cuff repair (Right, 2006); and rotator cuff repair (Right, 1/10/2020).    SOCIAL HISTORY  Social History     Tobacco Use   • Smoking status: Never Smoker   • Smokeless tobacco: Never Used   Vaping Use   • Vaping Use: Never used   Substance Use Topics   • Alcohol use: Not Currently   • Drug use: Yes     Types: Marijuana     Comment: does use CBD oil topical/with THC occasional orally       Social History     Substance and Sexual Activity   Drug Use Yes   • Types: Marijuana    Comment: does use CBD oil topical/with THC occasional orally        FAMILY HISTORY  Family History   Problem Relation Age of Onset   • Lung Cancer Mother    • Dementia Father        CURRENT MEDICATIONS  Current Outpatient Medications   Medication Instructions   • albuterol (ACCUNEB) 1.25 mg, Inhalation, 1 TIME  DAILY PRN   • albuterol (VENTOLIN HFA) 90 mcg, Inhalation, ONCE PRN   • atorvastatin (LIPITOR) 10 mg, Oral, EVERY EVENING   • B Complex Vitamins (B COMPLEX 1 PO) 500 mcg, Oral, DAILY   • Continuous Blood Gluc Sensor (DEXCOM G6 SENSOR) Misc 1 Application, Does not apply, CONTINUOUS   • esomeprazole (NEXIUM) 40 mg, Oral, EVERY EVENING   • fluticasone (FLONASE) 50 MCG/ACT nasal spray 1 Spray, Nasal, 2 TIMES DAILY   • FREESTYLE LITE strip No dose, route, or frequency recorded.   • HumaLOG 50 Units, Subcutaneous, DAILY, 40-60 units-insulin pump   • ibuprofen (MOTRIN) 600-800 mg, Oral, EVERY 8 HOURS PRN   • insulin infusion pump Device Subcutaneous, CONTINUOUS, Patient's own SQ insulin pump with automatically adjusting sensor<BR>Tandum control pump<BR>Type of Insulin: Humalog<BR>Last change of tubing: 10/10/21 - Change tubing and site every 72 hours<BR> Control 32iU<BR><BR>Dosin day average<BR>Basal rate:19 units/day<BR>Bolus: 18 units/day<BR>Correction ratio:5 units/day<BR><BR>Disconnect pump if patient becomes hypoglycemic and altered.    • Ketotifen Fumarate (ALLERGY EYE DROPS OP) 1 Drop, Both Eyes, PRN   • Multiple Minerals-Vitamins (CALCIUM-MAGNESIUM-ZINC-D3) Tab 2 Tablets, Oral, DAILY   • Multiple Vitamins-Minerals (MULTIVITAMIN ADULT PO) 1 Tablet, Oral, DAILY   • Pancrelipase, Lip-Prot-Amyl, (CREON) 74153 UNITS Cap DR Particles 2 Tablets, Oral, 3 TIMES DAILY WITH MEALS   • Probiotic Product (PROBIOTIC ADVANCED PO) 1 Capsule, Oral, EVERY EVENING   • Pseudoephedrine-Guaifenesin (MUCINEX D PO) 1 Tablet, Oral, 1 TIME DAILY PRN   • sertraline (ZOLOFT) 150 mg, EVERY MORNING     ALLERGIES  Allergies   Allergen Reactions   • Bee Anaphylaxis     Wasps   • Clindamycin Hives and Rash     Head to toe rash like chicken pox for 4 months.   • Penicillins Anaphylaxis   • Sulfa Drugs Hives and Itching     Rash hives itching   • Tape Rash     Rash and welt at site  PAPER TAPE OK   • Hydrocodone-Acetaminophen Vomiting   •  "Oxycodone-Acetaminophen Nausea   • Codeine Vomiting   • Percocet [Oxycodone-Acetaminophen] Vomiting   • Vicodin [Hydrocodone-Acetaminophen] Vomiting       PHYSICAL EXAM  VITAL SIGNS: /74   Pulse 94   Temp 36.8 °C (98.3 °F) (Temporal)   Resp 18   Ht 1.549 m (5' 1\")   Wt 69 kg (152 lb 1.9 oz)   LMP  (LMP Unknown)   SpO2 94%   BMI 28.74 kg/m²   Constitutional: Alert in no apparent distress.  HENT: No signs of trauma, Bilateral external ears normal, Nose normal.   Eyes: Pupils are equal and reactive, Conjunctiva normal, Non-icteric.   Neck: Normal range of motion, No tenderness, Supple, No stridor.   Cardiovascular: Regular rate and rhythm, no murmurs.   Thorax & Lungs: Normal breath sounds, No respiratory distress, No wheezing, No chest tenderness.   Abdomen: Right upper and lower quadrant tenderness. Bowel sounds normal, Soft, No masses, No peritoneal signs.  Skin: Warm, Dry, No erythema, No rash.   Musculoskeletal:  No major deformities noted.   Neurologic: Alert, moving all extremities without difficulty, no focal deficits.    LABS  Labs Reviewed   CBC WITH DIFFERENTIAL - Abnormal; Notable for the following components:       Result Value    MCHC 32.9 (*)     Platelet Count 148 (*)     Neutrophils-Polys 80.40 (*)     Lymphocytes 12.80 (*)     Neutrophils (Absolute) 8.11 (*)     All other components within normal limits   COMP METABOLIC PANEL - Abnormal; Notable for the following components:    Sodium 130 (*)     Potassium 3.4 (*)     Glucose 274 (*)     Creatinine 0.47 (*)     AST(SGOT) 71 (*)     ALT(SGPT) 89 (*)     Alkaline Phosphatase 411 (*)     Albumin 3.0 (*)     All other components within normal limits   LIPASE - Abnormal; Notable for the following components:    Lipase 5 (*)     All other components within normal limits   URINALYSIS - Abnormal; Notable for the following components:    Character Hazy (*)     Glucose 500 (*)     Ketones 15 (*)     Bilirubin Small (*)     Nitrite Positive (*)     " Leukocyte Esterase Small (*)     All other components within normal limits   URINE MICROSCOPIC (W/UA) - Abnormal; Notable for the following components:    WBC 10-20 (*)     Bacteria Moderate (*)     All other components within normal limits   ESTIMATED GFR   URINE CULTURE(NEW)    Narrative:     Indication for culture:->Patient WITHOUT an indwelling Plunkett  catheter in place with new onset of Dysuria, Frequency,  Urgency, and/or Suprapubic pain     All labs reviewed by me.    RADIOLOGY  CT-ABDOMEN-PELVIS WITH   Final Result      1.  No acute abnormality in the abdomen or pelvis.   2.  Colonic diverticulosis.   3.  Postsurgical changes within the abdomen, including those of pancreaticoduodenectomy.   4.  No liver lesions are appreciated on today's study.        The radiologist's interpretation of all radiological studies have been reviewed by me.    COURSE & MEDICAL DECISION MAKING  Pertinent Labs & Imaging studies reviewed. (See chart for details)    Differential diagnoses include but are not limited to:   Intraabdominal abscess  Procedural complication  UTI/pylonephritis    4:24 PM - Patient seen and examined at bedside. I verified that the patient was wearing a mask and I was wearing appropriate PPE every time I entered the room. The patient's mask was on the patient at all times during my encounter except for a brief view of the oropharynx. Ordered CT-abdomen-pelvis, Urine culture, UA microscopic, estimated GFR, CBC with differential, CMP, Lipase, and UA to evaluate her symptoms.     6:18 PM - Patient reevaluated at bedside. Informed her that urine results show infection, but CT is negative. Labs showed low sodium, but liver enzymes are normal. Plan of care discussed, including IV antibiotics before discharge with antibiotic regimen.    7:52 PM- I discussed the patient's case and the above findings with Dr. Diop (GI).     8:16 PM - Discussed plan for discharge; I advised the patient to follow-up with PCP, and  "to return to the [facility] ED with any new or worsening symptoms. Patient was given the opportunity for questions. I addressed all questions or concerns at this time and they verbalize agreement to the plan of care. Review of vital signs at this visit show:     /74   Pulse 94   Temp 36.8 °C (98.3 °F) (Temporal)   Resp 18   Ht 1.549 m (5' 1\")   Wt 69 kg (152 lb 1.9 oz)   LMP  (LMP Unknown)   SpO2 94%   BMI 28.74 kg/m²       Decision Making:  This is a 65 y.o. year old female who presents with right-sided abdominal pain.  She recently had a IR guided biopsy for a liver mass.  She has a history of pancreatic neuroendocrine tumor.  Given her recent instrumentation labs and CT were performed.  Her labs show a slightly low sodium at 130 she is hyperglycemic as well corrected sodium is 134.  Her LFTs are slightly abnormal but similar when compared to previous.  Her urinalysis does show evidence of nitrites as well as 10-20 white cells.  With moderate bacteria I do think this is indicative of UTI.  Patient is afebrile here.  Her white count is normal.  She was given antibiotics for UTI.  She tolerated ceftriaxone. CT was performed did not show any abnormality Specifically no fluid collection or complication from the procedure.    I did look through her chart and she has preliminary pathology report from the biopsy.  I did update the patient with the finding that this is likely recurrent neuroendocrine tumor.  She was expecting that this may be the case and has follow-up with Dr. MCKINNON early next week.  I also spoke with Dr. Diop who will relay the message to Dr. Rubin.  Patient will be discharged with Omnicef as she has multiple drug allergies and tolerated the Rocephin without difficulty.  I do not see any evidence of sepsis at this time. patient has appropriate follow-up this next week she will be discharged with return precautions.    The patient will return for new or worsening symptoms and is stable at " the time of discharge. Patient was given return precautions. Patient and/or family member verbalizes understanding and will comply.    DISPOSITION:  Patient will be discharged home in stable condition.    FOLLOW UP:  Shahbaz Jang M.D.  [address]  [address]  [place] [address]  [phone]    Schedule an appointment as soon as possible for a visit       Edward Rubin M.D.  [address]  [place] [address]  [phone]          [facility], Emergency Dept  [address]  [place] [id number] [phone]    Return to the emergency department for worsening pain, fevers, vomiting or other concerns.      OUTPATIENT MEDICATIONS:  Discharge Medication List as of 10/16/2021  8:06 PM      START taking these medications    Details   cefdinir (OMNICEF) 300 MG Cap Take 1 Capsule by mouth 2 times a day., Disp-20 Capsule, R-0, Normal           FINAL IMPRESSION  1. Pyelonephritis           This dictation has been created using voice recognition software and/or scribes. The accuracy of the dictation is limited by the abilities of the software and the expertise of the scribes. I expect there may be some errors of grammar and possibly content. I made every attempt to manually correct the errors within my dictation. However, errors related to voice recognition software and/or scribes may still exist and should be interpreted within the appropriate context.     IMelva ([place]), am scribing for, and in the presence of, Zoila Pappas M.D..    Electronically signed by: Melva Mena ([place]), 10/16/2021    Zoila DEL A ROSA M.D. personally performed the services described in this documentation, as scribed by Melva Mena in my presence, and it is both accurate and complete.    The note accurately reflects work and decisions made by me.  Zoila Pappas M.D.  10/16/2021  10:17 PM

## 2021-10-16 NOTE — ED TRIAGE NOTES
"Pt is referred to ED from [facility] for further evaluation and management of diffuse abdominal pain with fever recurring since yesterday.  She reports completion of a liver biopsy this past Wednesday.   Chief Complaint   Patient presents with   • Abdominal Pain   • Fever     /74   Pulse 94   Temp 36.8 °C (98.3 °F) (Temporal)   Resp 18   Ht 1.549 m (5' 1\")   Wt 69 kg (152 lb 1.9 oz)   LMP  (LMP Unknown)   SpO2 94%   BMI 28.74 kg/m²   Has this patient been vaccinated for COVID YES    "

## 2021-10-18 LAB
BACTERIA UR CULT: ABNORMAL
BACTERIA UR CULT: ABNORMAL
SIGNIFICANT IND 70042: ABNORMAL
SITE SITE: ABNORMAL
SOURCE SOURCE: ABNORMAL

## 2021-10-18 NOTE — ED NOTES
"ED Positive Culture Follow-up/Notification Note:    Date: 10/18/21     Patient seen in the ED on 10/16/2021 for RUQ and RLQ abdominal pain and fever. Also reports increased urinary frequency and \"flaquito\" colored urine. CT abd/pelvis with no abnormality, only with postsurgical changes 2/2 pancreaticoduodenectomy. Patient received ceftriaxone 1 g IV x1 in the ED.  1. Pyelonephritis       Discharge Medication List as of 10/16/2021  8:06 PM      START taking these medications    Details   cefdinir (OMNICEF) 300 MG Cap Take 1 Capsule by mouth 2 times a day., Disp-20 Capsule, R-0, Normal             Allergies: Bee, Clindamycin, Penicillins, Sulfa drugs, Tape, Hydrocodone-acetaminophen, Oxycodone-acetaminophen, Codeine, Percocet [oxycodone-acetaminophen], and Vicodin [hydrocodone-acetaminophen]     Vitals:    10/16/21 1357 10/16/21 1401 10/16/21 1847 10/16/21 2010   BP:  122/74 128/83 137/88   Pulse:  94 83 94   Resp:  18 16 16   Temp:  36.8 °C (98.3 °F) 37.4 °C (99.4 °F)    TempSrc:  Temporal Temporal    SpO2:  94% 96% 96%   Weight: 69 kg (152 lb 1.9 oz)      Height: 1.549 m (5' 1\")          Final cultures:   Results     Procedure Component Value Units Date/Time    URINE CULTURE(NEW) [[id number]]  (Abnormal)  (Susceptibility) Collected: 10/16/21 1413    Order Status: Completed Specimen: Urine Updated: 10/18/21 1132     Significant Indicator POS     Source UR     Site -     Culture Result -      Enterobacter cloacae complex  >100,000 cfu/mL      Narrative:      Indication for culture:->Patient WITHOUT an indwelling Plunkett  catheter in place with new onset of Dysuria, Frequency,  Urgency, and/or Suprapubic pain  Indication for culture:->Patient WITHOUT an indwelling Plunkett    Susceptibility     Enterobacter cloacae complex (1)     Antibiotic Interpretation Microscan Method Status    Amikacin  [*]  Sensitive <=16 mcg/mL GOYO Final    Ampicillin Resistant <=8 mcg/mL GOYO Final    Amoxicillin/CA  [*]  Resistant >16/8 mcg/mL GOYO " Final    Aztreonam  [*]  Sensitive <=4 mcg/mL GOYO Final    Ceftriaxone Sensitive <=1 mcg/mL GOYO Final    Ceftazidime  [*]  Sensitive <=1 mcg/mL GOYO Final    Cefazolin Resistant >16 mcg/mL GOYO Final    Ciprofloxacin Sensitive <=0.25 mcg/mL GOYO Final    Cefepime Sensitive <=2 mcg/mL GOYO Final    Cefuroxime Sensitive <=4 mcg/mL GOYO Final    Ampicillin/sulbactam  [*]  Resistant <=4/2 mcg/mL GOYO Final    Ertapenem  [*]  Sensitive <=0.5 mcg/mL GOYO Final    Tobramycin Sensitive <=2 mcg/mL GOYO Final    Nitrofurantoin Intermediate 64 mcg/mL GOYO Final    Gentamicin Sensitive <=2 mcg/mL GOYO Final    Imipenem  [*]  Sensitive <=1 mcg/mL GOYO Final    Levofloxacin Sensitive <=0.5 mcg/mL GOYO Final    Meropenem  [*]  Sensitive <=1 mcg/mL GOYO Final    Meropenem/Vaborbactam  [*]  Sensitive <=2 mcg/mL GOYO Final    Pip/Tazobactam Sensitive <=8 mcg/mL GOYO Final    Trimeth/Sulfa Sensitive <=0.5/9.5 mcg/mL GOYO Final    Tetracycline  [*]  Sensitive <=4 mcg/mL GOYO Final    Tigecycline Sensitive <=2 mcg/mL GOYO Final           [*]  Suppressed Antibiotic                 URINALYSIS [[id number]]  (Abnormal) Collected: 10/16/21 1413    Order Status: Completed Specimen: Urine Updated: 10/16/21 1451     Color Dark Yellow     Character Hazy     Specific Gravity 1.025     Ph 5.5     Glucose 500 mg/dL      Ketones 15 mg/dL      Protein Negative mg/dL      Bilirubin Small     Nitrite Positive     Leukocyte Esterase Small     Occult Blood Negative     Micro Urine Req Microscopic    URINE CULTURE(NEW) [[id number]] Collected: 10/16/21 0000    Order Status: Canceled Specimen: Urine           Plan:   Cefdinir should cover the Enterobacter in the urine based on susceptibility data (have lower suspicion for AmpC production given the isolate is aztreonam sensitive). Called patient to see how she is doing. Patient stated she does not feel like she has a UTI because she has not had any of the traditional UTI symptoms - she says she voiced this to the ED physician,  but that the ED physician told her she has a UTI. States that she continues to have abdominal pain and some fevers. She says she will be following up with Dr. Jang sometime today or tomorrow over the phone to discuss. At this point, recommended that she continue antibiotic therapy for now, and that she she discuss with Dr. Jang to see if further work-up is required, if he believes she has a UTI, and whether or not she should continue antibiotic therapy. Patient verbalized understanding and did not have any further questions.    Yolanda Bryant, PharmD  PGY2 Infectious Diseases Pharmacy Resident

## 2021-10-19 DIAGNOSIS — C7B.8 METASTATIC MALIGNANT NEUROENDOCRINE TUMOR TO LIVER (HCC): ICD-10-CM

## 2021-10-19 DIAGNOSIS — D3A.8 PRIMARY PANCREATIC NEUROENDOCRINE TUMOR: ICD-10-CM

## 2021-10-19 DIAGNOSIS — R10.9 ABDOMINAL PAIN, UNSPECIFIED ABDOMINAL LOCATION: ICD-10-CM

## 2021-10-19 DIAGNOSIS — C25.0 MALIGNANT NEOPLASM OF HEAD OF PANCREAS (HCC): ICD-10-CM

## 2021-10-26 ENCOUNTER — HOSPITAL ENCOUNTER (OUTPATIENT)
Dept: RADIOLOGY | Facility: MEDICAL CENTER | Age: 66
End: 2021-10-26
Attending: SURGERY
Payer: MEDICARE

## 2021-10-26 DIAGNOSIS — D3A.8 PRIMARY PANCREATIC NEUROENDOCRINE TUMOR: ICD-10-CM

## 2021-10-26 DIAGNOSIS — C25.0 MALIGNANT NEOPLASM OF HEAD OF PANCREAS (HCC): ICD-10-CM

## 2021-10-26 DIAGNOSIS — C7B.8 METASTATIC MALIGNANT NEUROENDOCRINE TUMOR TO LIVER (HCC): ICD-10-CM

## 2021-10-26 DIAGNOSIS — R10.9 ABDOMINAL PAIN, UNSPECIFIED ABDOMINAL LOCATION: ICD-10-CM

## 2021-10-26 PROCEDURE — A9592 CT-PETCT-NEUROENDOCRINE SKULL BASE TO MID-THIGH: HCPCS

## 2021-10-28 LAB — GLUCAGON SERPL-MCNC: 725 NG/L

## 2021-11-05 LAB — MISCELLANEOUS LAB RESULT MISCLAB: NORMAL

## 2021-11-08 NOTE — PROGRESS NOTES
Subjective:      Primary care physician:Brigitte Salmon D.O.  Referring Provider: Edward Rubin MD   Medical Oncologist: ***    Chief Complaint: No chief complaint on file.    Diagnosis:   1. Metastatic malignant neuroendocrine tumor to liver (HCC)     2. Primary pancreatic neuroendocrine tumor     3. Malignant neoplasm of head of pancreas (HCC)     4. Metastasis to liver (HCC)         History of presenting illness:  Denise Gaines  is a pleasant 65 y.o. year old female who presented with ***      Past Medical History:   Diagnosis Date   • Adverse effect of anesthesia     Pt was very sick after surgery on 8/4/2021 under a local    • Anesthesia     PONV/ slow to emerge per pt   • Arthritis 4/7/17    Bilateral shoulders and both hands-osteo   • ASTHMA     allergy induced-uses inhalers prn/flonase   • Blood clotting disorder (HCC) dx 10/2016    Right eye retina-Treated with laser and injections   • Bowel habit changes     Diarrhea chronic/ fatty stools at times   • Bronchitis 12/2017   • Cancer (HCC) 12/2013-dx    pancreatic CA. Whipple procedure 1/2014   • Carotid artery plaque 2016    per ultrasound   • Cataract 10/11/2021    Bilateral-early stages   • Colitis since age 14   • Delayed emergence from general anesthesia    • Diabetes (HCC) 2014    post whipple procedure/ uses humalog, insuliln pump   • Heart burn     treated with nexium   • Heart murmur     since birth, slight   • High cholesterol    • Indigestion     takes creon digestive enzymes   • MRSA (methicillin resistant Staphylococcus aureus) 11/2016    Left hand-cleared    • MRSA (methicillin resistant Staphylococcus aureus) 1/30/2017    Back of left thigh-cleared.   • Neuropathy 07/2021    viky hands and feet   • Osteoporosis    • Pneumonia 2012    gets a pneumovax   • PONV (postoperative nausea and vomiting)     Pt was very sick after surgery on 8/4/2021   • Psychiatric problem     depression/ anxiety    • Snoring     sinus issues   • Unspecified urinary  incontinence     uses pad   • Urinary bladder disorder     incontinence     Past Surgical History:   Procedure Laterality Date   • PB INCISE FINGER TENDON SHEATH Right 8/4/2021    Procedure: RELEASE, TRIGGER FINGER - THUMB, A1 UNRULY.;  Surgeon: Drew Duong M.D.;  Location: [facility];  Service: Orthopedics   • PB SHLDR ARTHROSCOP,PART ACROMIOPLAS Right 1/10/2020    Procedure: DECOMPRESSION, SHOULDER, ARTHROSCOPIC - SUBACROMIAL W/LABRAL DEBRIDEMENT, SUPERIOR CAPSULAR RECONSTRUCTION, VALLADARES;  Surgeon: Mei Palacios M.D.;  Location: [facility];  Service: Orthopedics   • BICEPS TENODESIS Right 1/10/2020    Procedure: TENODESIS, BICEPS;  Surgeon: Mei Palacios M.D.;  Location: [facility];  Service: Orthopedics   • ROTATOR CUFF REPAIR Right 1/10/2020    Procedure: REPAIR, ROTATOR CUFF;  Surgeon: Mei Palacios M.D.;  Location: [facility];  Service: Orthopedics   • GASTROSCOPY  3/15/2018    Procedure: GASTROSCOPY - POSS BIOPSY, DILATION, POLYPECTOMY, CONTROL OF HEMORRHAGE;  Surgeon: Edward Rubin M.D.;  Location: [facility];  Service: EUS   • EGD W/ENDOSCOPIC ULTRASOUND  3/15/2018    Procedure: EGD W/ENDOSCOPIC ULTRASOUND;  Surgeon: Edward Rubin M.D.;  Location: [facility];  Service: EUS   • EGD WITH ASP/BX  3/15/2018    Procedure: EGD WITH ASP/BX;  Surgeon: Edward Rubin M.D.;  Location: [facility];  Service: EUS   • GASTROSCOPY N/A 4/13/2017    Procedure: GASTROSCOPY - W/POSS BIOPSY, DILATION, POLYPECTOMY, CONTROL OF HEMORRHAGE;  Surgeon: Edward Rubin M.D.;  Location: [facility];  Service:    • EGD W/ENDOSCOPIC ULTRASOUND N/A 4/13/2017    Procedure: EGD W/ENDOSCOPIC ULTRASOUND;  Surgeon: Edward Rubin M.D.;  Location: [facility];  Service:    • COLONOSCOPY  2016    x 2   • ERCP-DIAGNOSTIC W/BIOPSY  8/2015    several to verify no return of pancreatic CA    • HERNIA  REPAIR  2014    5 umbilical repaired at same time   • LAPAROSCOPY  2014    Performed by Shahbaz Jang M.D. at SURGERY [facility]   • WHIPPLE PROCEDURE  2014    Performed by Shahbaz Jang M.D. at SURGERY [facility]   • NODE DISSECTION  2014    Performed by Shahbaz Jang M.D. at SURGERY [facility]   • TONSILLECTOMY     • OTHER ORTHOPEDIC SURGERY Left 2009    Nerve damage S/P lumbar surgery-foot broken and pinned.    • LUMBAR LAMINECTOMY DISKECTOMY  2008    L5   • SHOULDER ARTHROSCOPY W/ ROTATOR CUFF REPAIR Right 2006   • CARPAL TUNNEL RELEASE Right    • BLADDER SUSPENSION     • HYSTERECTOMY, TOTAL ABDOMINAL  1990     Allergies   Allergen Reactions   • Bee Anaphylaxis     Wasps   • Clindamycin Hives and Rash     Head to toe rash like chicken pox for 4 months.   • Penicillins Anaphylaxis   • Sulfa Drugs Hives and Itching     Rash hives itching   • Tape Rash     Rash and welt at site  PAPER TAPE OK   • Hydrocodone-Acetaminophen Vomiting   • Oxycodone-Acetaminophen Nausea   • Codeine Vomiting   • Percocet [Oxycodone-Acetaminophen] Vomiting   • Vicodin [Hydrocodone-Acetaminophen] Vomiting     Outpatient Encounter Medications as of 2021   Medication Sig Dispense Refill   • cefdinir (OMNICEF) 300 MG Cap Take 1 Capsule by mouth 2 times a day. 20 Capsule 0   • ibuprofen (MOTRIN) 200 MG Tab Take 600-800 mg by mouth every 8 hours as needed for Mild Pain.     • Ketotifen Fumarate (ALLERGY EYE DROPS OP) Administer 1 Drop into both eyes as needed (Itchy eyes).     • insulin infusion pump Device Inject  under the skin continuous. Patient's own SQ insulin pump with automatically adjusting sensor  Tandum control pump  Type of Insulin: Humalog  Last change of tubing: 10/10/21 - Change tubing and site every 72 hours   Control 32iU    Dosin day average  Basal rate:19 units/day  Bolus: 18 units/day  Correction ratio:5 units/day    Disconnect pump if  patient becomes hypoglycemic and altered.     • Continuous Blood Gluc Sensor (DEXCOM G6 SENSOR) Misc 1 Application continuous.     • Multiple Minerals-Vitamins (CALCIUM-MAGNESIUM-ZINC-D3) Tab Take 2 Tablets by mouth every day.     • FREESTYLE LITE strip      • albuterol (ACCUNEB) 1.25 MG/3ML nebulizer solution Inhale 1.25 mg by mouth 1 time daily as needed.     • albuterol (VENTOLIN HFA) 108 (90 Base) MCG/ACT Aero Soln inhalation aerosol Inhale 90 mcg by mouth Once PRN.     • fluticasone (FLONASE) 50 MCG/ACT nasal spray Spray 1 Spray in nose 2 times a day.     • HUMALOG 100 UNIT/ML Inject 50 Units under the skin every day. 40-60 units-insulin pump     • atorvastatin (LIPITOR) 10 MG Tab Take 10 mg by mouth every evening.     • esomeprazole (NEXIUM) 40 MG delayed-release capsule Take 40 mg by mouth every evening.     • B Complex Vitamins (B COMPLEX 1 PO) Take 500 mcg by mouth every day.     • Probiotic Product (PROBIOTIC ADVANCED PO) Take 1 Cap by mouth every evening.     • Multiple Vitamins-Minerals (MULTIVITAMIN ADULT PO) Take 1 Tab by mouth every day.     • Pseudoephedrine-Guaifenesin (MUCINEX D PO) Take 1 Tablet by mouth 1 time a day as needed.     • Pancrelipase, Lip-Prot-Amyl, (CREON) 75985 UNITS Cap DR Particles Take 2 Tabs by mouth 3 times a day, with meals.     • sertraline (ZOLOFT) 100 MG TABS Take 150 mg by mouth every morning.       No facility-administered encounter medications on file as of 11/9/2021.     Social History     Socioeconomic History   • Marital status: Single     Spouse name: Not on file   • Number of children: Not on file   • Years of education: Not on file   • Highest education level: Not on file   Occupational History   • Not on file   Tobacco Use   • Smoking status: Never Smoker   • Smokeless tobacco: Never Used   Vaping Use   • Vaping Use: Never used   Substance and Sexual Activity   • Alcohol use: Not Currently   • Drug use: Yes     Types: Marijuana     Comment: does use CBD oil  topical/with THC occasional orally    • Sexual activity: Not on file   Other Topics Concern   • Not on file   Social History Narrative   • Not on file     Social Determinants of Health     Financial Resource Strain:    • Difficulty of Paying Living Expenses: Not on file   Food Insecurity:    • Worried About Running Out of Food in the Last Year: Not on file   • Ran Out of Food in the Last Year: Not on file   Transportation Needs:    • Lack of Transportation (Medical): Not on file   • Lack of Transportation (Non-Medical): Not on file   Physical Activity:    • Days of Exercise per Week: Not on file   • Minutes of Exercise per Session: Not on file   Stress:    • Feeling of Stress : Not on file   Social Connections:    • Frequency of Communication with Friends and Family: Not on file   • Frequency of Social Gatherings with Friends and Family: Not on file   • Attends Temple Services: Not on file   • Active Member of Clubs or Organizations: Not on file   • Attends Club or Organization Meetings: Not on file   • Marital Status: Not on file   Intimate Partner Violence:    • Fear of Current or Ex-Partner: Not on file   • Emotionally Abused: Not on file   • Physically Abused: Not on file   • Sexually Abused: Not on file   Housing Stability:    • Unable to Pay for Housing in the Last Year: Not on file   • Number of Places Lived in the Last Year: Not on file   • Unstable Housing in the Last Year: Not on file      Social History     Tobacco Use   Smoking Status Never Smoker   Smokeless Tobacco Never Used     Social History     Substance and Sexual Activity   Alcohol Use Not Currently     Social History     Substance and Sexual Activity   Drug Use Yes   • Types: Marijuana    Comment: does use CBD oil topical/with THC occasional orally         Family History   Problem Relation Age of Onset   • Lung Cancer Mother    • Dementia Father        ROS     Objective:   LMP  (LMP Unknown)     Physical Exam    Labs:  Results for ZARA  TOHNY KENNEDY (MRN [id number]) as of 11/8/2021 13:48   Ref. Range 10/16/2021 14:13   WBC Latest Ref Range: 4.8 - 10.8 K/uL 10.1   RBC Latest Ref Range: 4.20 - 5.40 M/uL 4.33   Hemoglobin Latest Ref Range: 12.0 - 16.0 g/dL 13.3   Hematocrit Latest Ref Range: 37.0 - 47.0 % 40.4   MCV Latest Ref Range: 81.4 - 97.8 fL 93.3   MCH Latest Ref Range: 27.0 - 33.0 pg 30.7   MCHC Latest Ref Range: 33.6 - 35.0 g/dL 32.9 (L)   RDW Latest Ref Range: 35.9 - 50.0 fL 47.8   Platelet Count Latest Ref Range: 164 - 446 K/uL 148 (L)   MPV Latest Ref Range: 9.0 - 12.9 fL 10.4   Neutrophils-Polys Latest Ref Range: 44.00 - 72.00 % 80.40 (H)   Neutrophils (Absolute) Latest Ref Range: 2.00 - 7.15 K/uL 8.11 (H)   Lymphocytes Latest Ref Range: 22.00 - 41.00 % 12.80 (L)   Lymphs (Absolute) Latest Ref Range: 1.00 - 4.80 K/uL 1.29   Monocytes Latest Ref Range: 0.00 - 13.40 % 5.60   Monos (Absolute) Latest Ref Range: 0.00 - 0.85 K/uL 0.57   Eosinophils Latest Ref Range: 0.00 - 6.90 % 0.30   Eos (Absolute) Latest Ref Range: 0.00 - 0.51 K/uL 0.03   Basophils Latest Ref Range: 0.00 - 1.80 % 0.40   Baso (Absolute) Latest Ref Range: 0.00 - 0.12 K/uL 0.04   Immature Granulocytes Latest Ref Range: 0.00 - 0.90 % 0.50   Immature Granulocytes (abs) Latest Ref Range: 0.00 - 0.11 K/uL 0.05   Nucleated RBC Latest Units: /100 WBC 0.00   NRBC (Absolute) Latest Units: K/uL 0.00   Sodium Latest Ref Range: 135 - 145 mmol/L 130 (L)   Potassium Latest Ref Range: 3.6 - 5.5 mmol/L 3.4 (L)   Chloride Latest Ref Range: 96 - 112 mmol/L 99   Co2 Latest Ref Range: 20 - 33 mmol/L 21   Anion Gap Latest Ref Range: 7.0 - 16.0  10.0   Glucose Latest Ref Range: 65 - 99 mg/dL 274 (H)   Bun Latest Ref Range: 8 - 22 mg/dL 10   Creatinine Latest Ref Range: 0.50 - 1.40 mg/dL 0.47 (L)   GFR If  Latest Ref Range: >60 mL/min/1.73 m 2 >60   GFR If Non  Latest Ref Range: >60 mL/min/1.73 m 2 >60   Calcium Latest Ref Range: 8.4 - 10.2 mg/dL 8.6   AST(SGOT) Latest  Ref Range: 12 - 45 U/L 71 (H)   ALT(SGPT) Latest Ref Range: 2 - 50 U/L 89 (H)   Alkaline Phosphatase Latest Ref Range: 30 - 99 U/L 411 (H)   Total Bilirubin Latest Ref Range: 0.1 - 1.5 mg/dL 1.2   Albumin Latest Ref Range: 3.2 - 4.9 g/dL 3.0 (L)   Total Protein Latest Ref Range: 6.0 - 8.2 g/dL 6.3   Globulin Latest Ref Range: 1.9 - 3.5 g/dL 3.3   A-G Ratio Latest Units: g/dL 0.9   Lipase Latest Ref Range: 7 - 58 U/L 5 (L)   Color Unknown Dark Yellow   Character Unknown Hazy (A)   Specific Gravity Latest Ref Range: <1.035  1.025   Ph Latest Ref Range: 5.0 - 8.0  5.5   Glucose Latest Ref Range: Negative mg/dL 500 (A)   Ketones Latest Ref Range: Negative mg/dL 15 (A)   Bilirubin Latest Ref Range: Negative  Small (A)   Occult Blood Latest Ref Range: Negative  Negative   Protein Latest Ref Range: Negative mg/dL Negative   Nitrite Latest Ref Range: Negative  Positive (A)   Leukocyte Esterase Latest Ref Range: Negative  Small (A)   Micro Urine Req Unknown Microscopic   WBC Latest Units: /hpf 10-20 (A)   RBC Latest Units: /hpf 0-2   Epithelial Cells Latest Ref Range: Few /hpf Few   Bacteria Latest Ref Range: None /hpf Moderate (A)   Mucous Threads Latest Units: /hpf Moderate   Urine Crystals Latest Units: /hpf Few Amorphous   Significant Indicator Unknown POS (POS)   Site Unknown -   Source Unknown UR       Imaging: 10/26/21 PET/CT     Per my read,   IMPRESSION:        1. Solitary hypermetabolic hepatic metastasis, corresponding to the lesion seen on the recent MRI.  2. No other hypermetabolic liver lesions.  3. Small hypermetabolic retroperitoneal elizabeth metastases, measuring up to 0.7 cm short axis.  4. No metastatic disease in the chest.  5. Sequela of pancreaticoduodenectomy, with a stent in the pancreatic body.     Pathology: 10/13/21    FINAL DIAGNOSIS:     A. Liver mass biopsy cores:          Metastatic well-differentiated neuroendocrine tumor consistent           with the patient's known pancreatic primary.      Procedures: 10/13/21    CT guided 20 gauge core biopsy of the right lobe of the liver targeting a 28 mm lesion which was best seen on MRI.    Diagnosis:     1. Metastatic malignant neuroendocrine tumor to liver (HCC)     2. Primary pancreatic neuroendocrine tumor     3. Malignant neoplasm of head of pancreas (HCC)     4. Metastasis to liver (HCC)             Medical Decision Making:  Today's Assessment / Status / Plan:     ***

## 2021-11-09 ENCOUNTER — APPOINTMENT (OUTPATIENT)
Dept: SURGICAL ONCOLOGY | Facility: MEDICAL CENTER | Age: 66
End: 2021-11-09
Payer: MEDICARE

## 2021-11-09 ENCOUNTER — TELEPHONE (OUTPATIENT)
Dept: SURGICAL ONCOLOGY | Facility: MEDICAL CENTER | Age: 66
End: 2021-11-09

## 2021-11-09 NOTE — TELEPHONE ENCOUNTER
Discussion regarding the patient's PET dotatate due to the fact that I was canceling clinic because I was not feeling well.  I personally spoke to the patient described that the only uptake was in the right lobe of the liver consistent with the tumor that was seen preoperatively.  I did discuss with the patient that we could schedule her for a laparoscopic or open ablation of the tumor.  We discussed the risks and benefits of the procedure including bleeding, infection, alternative options including yttrium-90 and TACE.  I did not feel the patient would benefit from a total right hepatectomy for small tumor in the center of the liver.  She has agreed above plan patient be scheduled soon as possible.

## 2021-11-16 ENCOUNTER — PRE-ADMISSION TESTING (OUTPATIENT)
Dept: ADMISSIONS | Facility: MEDICAL CENTER | Age: 66
End: 2021-11-16
Attending: SURGERY
Payer: MEDICARE

## 2021-11-16 RX ORDER — FLUCONAZOLE 150 MG/1
150 TABLET ORAL DAILY
Status: ON HOLD | COMMUNITY
Start: 2021-10-27 | End: 2021-11-22

## 2021-11-17 ENCOUNTER — PRE-ADMISSION TESTING (OUTPATIENT)
Dept: ADMISSIONS | Facility: MEDICAL CENTER | Age: 66
End: 2021-11-17
Attending: SURGERY
Payer: MEDICARE

## 2021-11-17 DIAGNOSIS — Z01.810 PRE-OPERATIVE CARDIOVASCULAR EXAMINATION: ICD-10-CM

## 2021-11-17 DIAGNOSIS — Z01.812 PRE-OPERATIVE LABORATORY EXAMINATION: ICD-10-CM

## 2021-11-17 LAB
ALBUMIN SERPL BCP-MCNC: 3.4 G/DL (ref 3.2–4.9)
ALBUMIN/GLOB SERPL: 1.1 G/DL
ALP SERPL-CCNC: 455 U/L (ref 30–99)
ALT SERPL-CCNC: 88 U/L (ref 2–50)
ANION GAP SERPL CALC-SCNC: 9 MMOL/L (ref 7–16)
AST SERPL-CCNC: 110 U/L (ref 12–45)
BASOPHILS # BLD AUTO: 1 % (ref 0–1.8)
BASOPHILS # BLD: 0.05 K/UL (ref 0–0.12)
BILIRUB SERPL-MCNC: 0.4 MG/DL (ref 0.1–1.5)
BUN SERPL-MCNC: 14 MG/DL (ref 8–22)
CALCIUM SERPL-MCNC: 8.6 MG/DL (ref 8.5–10.5)
CHLORIDE SERPL-SCNC: 105 MMOL/L (ref 96–112)
CO2 SERPL-SCNC: 23 MMOL/L (ref 20–33)
CREAT SERPL-MCNC: 0.41 MG/DL (ref 0.5–1.4)
EKG IMPRESSION: NORMAL
EOSINOPHIL # BLD AUTO: 0.24 K/UL (ref 0–0.51)
EOSINOPHIL NFR BLD: 4.8 % (ref 0–6.9)
ERYTHROCYTE [DISTWIDTH] IN BLOOD BY AUTOMATED COUNT: 48.7 FL (ref 35.9–50)
GLOBULIN SER CALC-MCNC: 3.1 G/DL (ref 1.9–3.5)
GLUCOSE SERPL-MCNC: 206 MG/DL (ref 65–99)
HCT VFR BLD AUTO: 41.1 % (ref 37–47)
HGB BLD-MCNC: 13.5 G/DL (ref 12–16)
IMM GRANULOCYTES # BLD AUTO: 0.01 K/UL (ref 0–0.11)
IMM GRANULOCYTES NFR BLD AUTO: 0.2 % (ref 0–0.9)
INR PPP: 1.04 (ref 0.87–1.13)
LYMPHOCYTES # BLD AUTO: 1.83 K/UL (ref 1–4.8)
LYMPHOCYTES NFR BLD: 36.6 % (ref 22–41)
MCH RBC QN AUTO: 29.9 PG (ref 27–33)
MCHC RBC AUTO-ENTMCNC: 32.8 G/DL (ref 33.6–35)
MCV RBC AUTO: 91.1 FL (ref 81.4–97.8)
MONOCYTES # BLD AUTO: 0.33 K/UL (ref 0–0.85)
MONOCYTES NFR BLD AUTO: 6.6 % (ref 0–13.4)
NEUTROPHILS # BLD AUTO: 2.54 K/UL (ref 2–7.15)
NEUTROPHILS NFR BLD: 50.8 % (ref 44–72)
NRBC # BLD AUTO: 0 K/UL
NRBC BLD-RTO: 0 /100 WBC
PLATELET # BLD AUTO: 170 K/UL (ref 164–446)
PMV BLD AUTO: 10.4 FL (ref 9–12.9)
POTASSIUM SERPL-SCNC: 3.9 MMOL/L (ref 3.6–5.5)
PROT SERPL-MCNC: 6.5 G/DL (ref 6–8.2)
PROTHROMBIN TIME: 13.3 SEC (ref 12–14.6)
RBC # BLD AUTO: 4.51 M/UL (ref 4.2–5.4)
SARS-COV-2 RNA RESP QL NAA+PROBE: NOTDETECTED
SODIUM SERPL-SCNC: 137 MMOL/L (ref 135–145)
SPECIMEN SOURCE: NORMAL
WBC # BLD AUTO: 5 K/UL (ref 4.8–10.8)

## 2021-11-17 PROCEDURE — 93005 ELECTROCARDIOGRAM TRACING: CPT

## 2021-11-17 PROCEDURE — C9803 HOPD COVID-19 SPEC COLLECT: HCPCS

## 2021-11-17 PROCEDURE — U0005 INFEC AGEN DETEC AMPLI PROBE: HCPCS

## 2021-11-17 PROCEDURE — U0003 INFECTIOUS AGENT DETECTION BY NUCLEIC ACID (DNA OR RNA); SEVERE ACUTE RESPIRATORY SYNDROME CORONAVIRUS 2 (SARS-COV-2) (CORONAVIRUS DISEASE [COVID-19]), AMPLIFIED PROBE TECHNIQUE, MAKING USE OF HIGH THROUGHPUT TECHNOLOGIES AS DESCRIBED BY CMS-2020-01-R: HCPCS

## 2021-11-17 PROCEDURE — 85025 COMPLETE CBC W/AUTO DIFF WBC: CPT

## 2021-11-17 PROCEDURE — 36415 COLL VENOUS BLD VENIPUNCTURE: CPT

## 2021-11-17 PROCEDURE — 85610 PROTHROMBIN TIME: CPT

## 2021-11-17 PROCEDURE — 80053 COMPREHEN METABOLIC PANEL: CPT

## 2021-11-17 PROCEDURE — 93010 ELECTROCARDIOGRAM REPORT: CPT | Performed by: INTERNAL MEDICINE

## 2021-11-22 ENCOUNTER — ANESTHESIA (OUTPATIENT)
Dept: SURGERY | Facility: MEDICAL CENTER | Age: 66
End: 2021-11-22
Payer: MEDICARE

## 2021-11-22 ENCOUNTER — HOSPITAL ENCOUNTER (OUTPATIENT)
Facility: MEDICAL CENTER | Age: 66
End: 2021-11-22
Attending: SURGERY | Admitting: SURGERY
Payer: MEDICARE

## 2021-11-22 ENCOUNTER — ANESTHESIA EVENT (OUTPATIENT)
Dept: SURGERY | Facility: MEDICAL CENTER | Age: 66
End: 2021-11-22
Payer: MEDICARE

## 2021-11-22 VITALS
TEMPERATURE: 97.5 F | WEIGHT: 151.24 LBS | OXYGEN SATURATION: 97 % | HEIGHT: 61 IN | RESPIRATION RATE: 16 BRPM | BODY MASS INDEX: 28.55 KG/M2 | HEART RATE: 81 BPM | DIASTOLIC BLOOD PRESSURE: 74 MMHG | SYSTOLIC BLOOD PRESSURE: 114 MMHG

## 2021-11-22 DIAGNOSIS — C25.0 MALIGNANT NEOPLASM OF HEAD OF PANCREAS (HCC): ICD-10-CM

## 2021-11-22 DIAGNOSIS — E11.9 TYPE 2 DIABETES MELLITUS WITHOUT COMPLICATION, WITH LONG-TERM CURRENT USE OF INSULIN (HCC): ICD-10-CM

## 2021-11-22 DIAGNOSIS — Z79.4 TYPE 2 DIABETES MELLITUS WITHOUT COMPLICATION, WITH LONG-TERM CURRENT USE OF INSULIN (HCC): ICD-10-CM

## 2021-11-22 DIAGNOSIS — K21.9 GASTROESOPHAGEAL REFLUX DISEASE, UNSPECIFIED WHETHER ESOPHAGITIS PRESENT: ICD-10-CM

## 2021-11-22 DIAGNOSIS — K83.1 OBSTRUCTION OF BILE DUCT: ICD-10-CM

## 2021-11-22 DIAGNOSIS — C7B.8 METASTATIC MALIGNANT NEUROENDOCRINE TUMOR TO LIVER (HCC): ICD-10-CM

## 2021-11-22 LAB
GLUCOSE BLD-MCNC: 110 MG/DL (ref 65–99)
GLUCOSE BLD-MCNC: 151 MG/DL (ref 65–99)

## 2021-11-22 PROCEDURE — 160046 HCHG PACU - 1ST 60 MINS PHASE II: Performed by: SURGERY

## 2021-11-22 PROCEDURE — 700111 HCHG RX REV CODE 636 W/ 250 OVERRIDE (IP): Performed by: ANESTHESIOLOGY

## 2021-11-22 PROCEDURE — 700101 HCHG RX REV CODE 250: Performed by: SURGERY

## 2021-11-22 PROCEDURE — 700101 HCHG RX REV CODE 250: Performed by: ANESTHESIOLOGY

## 2021-11-22 PROCEDURE — 160041 HCHG SURGERY MINUTES - EA ADDL 1 MIN LEVEL 4: Performed by: SURGERY

## 2021-11-22 PROCEDURE — 160025 RECOVERY II MINUTES (STATS): Performed by: SURGERY

## 2021-11-22 PROCEDURE — 160036 HCHG PACU - EA ADDL 30 MINS PHASE I: Performed by: SURGERY

## 2021-11-22 PROCEDURE — 501583 HCHG TROCAR, THRD CAN&SEAL 5X100: Performed by: SURGERY

## 2021-11-22 PROCEDURE — 44180 LAP ENTEROLYSIS: CPT | Performed by: SURGERY

## 2021-11-22 PROCEDURE — 501570 HCHG TROCAR, SEPARATOR: Performed by: SURGERY

## 2021-11-22 PROCEDURE — 501838 HCHG SUTURE GENERAL: Performed by: SURGERY

## 2021-11-22 PROCEDURE — 700102 HCHG RX REV CODE 250 W/ 637 OVERRIDE(OP): Performed by: ANESTHESIOLOGY

## 2021-11-22 PROCEDURE — 160035 HCHG PACU - 1ST 60 MINS PHASE I: Performed by: SURGERY

## 2021-11-22 PROCEDURE — 76998 US GUIDE INTRAOP: CPT | Mod: 26 | Performed by: SURGERY

## 2021-11-22 PROCEDURE — 160029 HCHG SURGERY MINUTES - 1ST 30 MINS LEVEL 4: Performed by: SURGERY

## 2021-11-22 PROCEDURE — 160009 HCHG ANES TIME/MIN: Performed by: SURGERY

## 2021-11-22 PROCEDURE — 700105 HCHG RX REV CODE 258: Performed by: SURGERY

## 2021-11-22 PROCEDURE — 502571 HCHG PACK, LAP CHOLE: Performed by: SURGERY

## 2021-11-22 PROCEDURE — 501571 HCHG TROCAR, SEPARATOR 12X100: Performed by: SURGERY

## 2021-11-22 PROCEDURE — 160048 HCHG OR STATISTICAL LEVEL 1-5: Performed by: SURGERY

## 2021-11-22 PROCEDURE — 82962 GLUCOSE BLOOD TEST: CPT

## 2021-11-22 PROCEDURE — A9270 NON-COVERED ITEM OR SERVICE: HCPCS | Performed by: ANESTHESIOLOGY

## 2021-11-22 PROCEDURE — 160002 HCHG RECOVERY MINUTES (STAT): Performed by: SURGERY

## 2021-11-22 RX ORDER — LIDOCAINE HYDROCHLORIDE 20 MG/ML
INJECTION, SOLUTION EPIDURAL; INFILTRATION; INTRACAUDAL; PERINEURAL PRN
Status: DISCONTINUED | OUTPATIENT
Start: 2021-11-22 | End: 2021-11-22 | Stop reason: SURG

## 2021-11-22 RX ORDER — ONDANSETRON 2 MG/ML
INJECTION INTRAMUSCULAR; INTRAVENOUS PRN
Status: DISCONTINUED | OUTPATIENT
Start: 2021-11-22 | End: 2021-11-22 | Stop reason: SURG

## 2021-11-22 RX ORDER — TRAMADOL HYDROCHLORIDE 50 MG/1
50-100 TABLET ORAL EVERY 4 HOURS PRN
Qty: 30 TABLET | Refills: 0 | Status: SHIPPED | OUTPATIENT
Start: 2021-11-22 | End: 2022-01-01

## 2021-11-22 RX ORDER — LIDOCAINE HYDROCHLORIDE 40 MG/ML
SOLUTION TOPICAL PRN
Status: DISCONTINUED | OUTPATIENT
Start: 2021-11-22 | End: 2021-11-22 | Stop reason: SURG

## 2021-11-22 RX ORDER — BUPIVACAINE HYDROCHLORIDE AND EPINEPHRINE 5; 5 MG/ML; UG/ML
INJECTION, SOLUTION PERINEURAL
Status: DISCONTINUED | OUTPATIENT
Start: 2021-11-22 | End: 2021-11-22 | Stop reason: HOSPADM

## 2021-11-22 RX ORDER — DIPHENHYDRAMINE HYDROCHLORIDE 50 MG/ML
6.25 INJECTION INTRAMUSCULAR; INTRAVENOUS
Status: DISCONTINUED | OUTPATIENT
Start: 2021-11-22 | End: 2021-11-22 | Stop reason: HOSPADM

## 2021-11-22 RX ORDER — SODIUM CHLORIDE, SODIUM LACTATE, POTASSIUM CHLORIDE, CALCIUM CHLORIDE 600; 310; 30; 20 MG/100ML; MG/100ML; MG/100ML; MG/100ML
INJECTION, SOLUTION INTRAVENOUS CONTINUOUS
Status: DISCONTINUED | OUTPATIENT
Start: 2021-11-22 | End: 2021-11-22 | Stop reason: HOSPADM

## 2021-11-22 RX ORDER — HYDRALAZINE HYDROCHLORIDE 20 MG/ML
5 INJECTION INTRAMUSCULAR; INTRAVENOUS
Status: DISCONTINUED | OUTPATIENT
Start: 2021-11-22 | End: 2021-11-22 | Stop reason: HOSPADM

## 2021-11-22 RX ORDER — CELECOXIB 200 MG/1
200 CAPSULE ORAL 2 TIMES DAILY
Qty: 60 CAPSULE | Refills: 1 | Status: SHIPPED | OUTPATIENT
Start: 2021-11-22 | End: 2022-01-11

## 2021-11-22 RX ORDER — MAGNESIUM HYDROXIDE 1200 MG/15ML
LIQUID ORAL
Status: COMPLETED | OUTPATIENT
Start: 2021-11-22 | End: 2021-11-22

## 2021-11-22 RX ORDER — ONDANSETRON 2 MG/ML
4 INJECTION INTRAMUSCULAR; INTRAVENOUS
Status: DISCONTINUED | OUTPATIENT
Start: 2021-11-22 | End: 2021-11-22 | Stop reason: HOSPADM

## 2021-11-22 RX ORDER — CEFAZOLIN SODIUM 1 G/3ML
INJECTION, POWDER, FOR SOLUTION INTRAMUSCULAR; INTRAVENOUS PRN
Status: DISCONTINUED | OUTPATIENT
Start: 2021-11-22 | End: 2021-11-22 | Stop reason: SURG

## 2021-11-22 RX ORDER — DEXAMETHASONE SODIUM PHOSPHATE 4 MG/ML
INJECTION, SOLUTION INTRA-ARTICULAR; INTRALESIONAL; INTRAMUSCULAR; INTRAVENOUS; SOFT TISSUE PRN
Status: DISCONTINUED | OUTPATIENT
Start: 2021-11-22 | End: 2021-11-22 | Stop reason: SURG

## 2021-11-22 RX ORDER — MORPHINE SULFATE 4 MG/ML
1 INJECTION, SOLUTION INTRAMUSCULAR; INTRAVENOUS
Status: DISCONTINUED | OUTPATIENT
Start: 2021-11-22 | End: 2021-11-22 | Stop reason: HOSPADM

## 2021-11-22 RX ORDER — MORPHINE SULFATE 4 MG/ML
2 INJECTION, SOLUTION INTRAMUSCULAR; INTRAVENOUS
Status: DISCONTINUED | OUTPATIENT
Start: 2021-11-22 | End: 2021-11-22 | Stop reason: HOSPADM

## 2021-11-22 RX ORDER — MAGNESIUM SULFATE HEPTAHYDRATE 40 MG/ML
INJECTION, SOLUTION INTRAVENOUS PRN
Status: DISCONTINUED | OUTPATIENT
Start: 2021-11-22 | End: 2021-11-22 | Stop reason: SURG

## 2021-11-22 RX ORDER — MEPERIDINE HYDROCHLORIDE 25 MG/ML
12.5 INJECTION INTRAMUSCULAR; INTRAVENOUS; SUBCUTANEOUS
Status: DISCONTINUED | OUTPATIENT
Start: 2021-11-22 | End: 2021-11-22 | Stop reason: HOSPADM

## 2021-11-22 RX ORDER — PHENYLEPHRINE HYDROCHLORIDE 10 MG/ML
INJECTION, SOLUTION INTRAMUSCULAR; INTRAVENOUS; SUBCUTANEOUS PRN
Status: DISCONTINUED | OUTPATIENT
Start: 2021-11-22 | End: 2021-11-22 | Stop reason: SURG

## 2021-11-22 RX ORDER — GUAIFENESIN 600 MG/1
600 TABLET, EXTENDED RELEASE ORAL DAILY
COMMUNITY

## 2021-11-22 RX ORDER — HALOPERIDOL 5 MG/ML
1 INJECTION INTRAMUSCULAR
Status: DISCONTINUED | OUTPATIENT
Start: 2021-11-22 | End: 2021-11-22 | Stop reason: HOSPADM

## 2021-11-22 RX ORDER — SODIUM CHLORIDE, SODIUM LACTATE, POTASSIUM CHLORIDE, CALCIUM CHLORIDE 600; 310; 30; 20 MG/100ML; MG/100ML; MG/100ML; MG/100ML
INJECTION, SOLUTION INTRAVENOUS CONTINUOUS
Status: ACTIVE | OUTPATIENT
Start: 2021-11-22 | End: 2021-11-22

## 2021-11-22 RX ORDER — TRAMADOL HYDROCHLORIDE 50 MG/1
50 TABLET ORAL EVERY 6 HOURS PRN
Status: DISCONTINUED | OUTPATIENT
Start: 2021-11-22 | End: 2021-11-22 | Stop reason: HOSPADM

## 2021-11-22 RX ORDER — MORPHINE SULFATE 10 MG/ML
5 INJECTION, SOLUTION INTRAMUSCULAR; INTRAVENOUS
Status: DISCONTINUED | OUTPATIENT
Start: 2021-11-22 | End: 2021-11-22 | Stop reason: HOSPADM

## 2021-11-22 RX ADMIN — ONDANSETRON 4 MG: 2 INJECTION INTRAMUSCULAR; INTRAVENOUS at 09:36

## 2021-11-22 RX ADMIN — EPHEDRINE SULFATE 10 MG: 50 INJECTION INTRAMUSCULAR; INTRAVENOUS; SUBCUTANEOUS at 09:08

## 2021-11-22 RX ADMIN — LIDOCAINE HYDROCHLORIDE 50 MG: 20 INJECTION, SOLUTION EPIDURAL; INFILTRATION; INTRACAUDAL at 08:43

## 2021-11-22 RX ADMIN — LIDOCAINE HYDROCHLORIDE 0.5 ML: 10 INJECTION, SOLUTION EPIDURAL; INFILTRATION; INTRACAUDAL; PERINEURAL at 07:22

## 2021-11-22 RX ADMIN — PROPOFOL 150 MG: 10 INJECTION, EMULSION INTRAVENOUS at 08:43

## 2021-11-22 RX ADMIN — EPHEDRINE SULFATE 10 MG: 50 INJECTION INTRAMUSCULAR; INTRAVENOUS; SUBCUTANEOUS at 08:47

## 2021-11-22 RX ADMIN — EPHEDRINE SULFATE 10 MG: 50 INJECTION INTRAMUSCULAR; INTRAVENOUS; SUBCUTANEOUS at 09:17

## 2021-11-22 RX ADMIN — MAGNESIUM SULFATE HEPTAHYDRATE 2 G: 40 INJECTION, SOLUTION INTRAVENOUS at 08:48

## 2021-11-22 RX ADMIN — SODIUM CHLORIDE, POTASSIUM CHLORIDE, SODIUM LACTATE AND CALCIUM CHLORIDE: 600; 310; 30; 20 INJECTION, SOLUTION INTRAVENOUS at 07:22

## 2021-11-22 RX ADMIN — LIDOCAINE HYDROCHLORIDE 4 ML: 40 SOLUTION TOPICAL at 08:43

## 2021-11-22 RX ADMIN — EPHEDRINE SULFATE 10 MG: 50 INJECTION INTRAMUSCULAR; INTRAVENOUS; SUBCUTANEOUS at 08:57

## 2021-11-22 RX ADMIN — FENTANYL CITRATE 100 MCG: 50 INJECTION, SOLUTION INTRAMUSCULAR; INTRAVENOUS at 08:43

## 2021-11-22 RX ADMIN — DEXAMETHASONE SODIUM PHOSPHATE 8 MG: 4 INJECTION, SOLUTION INTRA-ARTICULAR; INTRALESIONAL; INTRAMUSCULAR; INTRAVENOUS; SOFT TISSUE at 08:48

## 2021-11-22 RX ADMIN — TRAMADOL HYDROCHLORIDE 50 MG: 50 TABLET, FILM COATED ORAL at 10:24

## 2021-11-22 RX ADMIN — PHENYLEPHRINE HYDROCHLORIDE 100 MCG: 10 INJECTION INTRAVENOUS at 09:17

## 2021-11-22 RX ADMIN — ROCURONIUM BROMIDE 50 MG: 10 INJECTION, SOLUTION INTRAVENOUS at 08:43

## 2021-11-22 RX ADMIN — CEFAZOLIN 2 G: 330 INJECTION, POWDER, FOR SOLUTION INTRAMUSCULAR; INTRAVENOUS at 08:43

## 2021-11-22 RX ADMIN — SUGAMMADEX 200 MG: 100 INJECTION, SOLUTION INTRAVENOUS at 09:40

## 2021-11-22 ASSESSMENT — FIBROSIS 4 INDEX: FIB4 SCORE: 4.48

## 2021-11-22 ASSESSMENT — PAIN DESCRIPTION - PAIN TYPE
TYPE: ACUTE PAIN;SURGICAL PAIN
TYPE: ACUTE PAIN;SURGICAL PAIN
TYPE: SURGICAL PAIN

## 2021-11-22 NOTE — ANESTHESIA POSTPROCEDURE EVALUATION
Patient: Denise Gaines    Procedure Summary     Date: 11/22/21 Room / Location: [place] [id number] / SURGERY [facility]    Anesthesia Start: 0839 Anesthesia Stop: 0949    Procedure: LESION, LIVER, DIAGNOSTIC LAPAROSCOPIC - LOBE intra operative ultrasound (Bilateral Abdomen) Diagnosis: (PANCREATIC NEUROENDOCRINE TUMOR WITH LIVER METS)    Surgeons: Shahbaz Jang M.D. Responsible Provider: Stu Sanchez M.D.    Anesthesia Type: general ASA Status: 3          Final Anesthesia Type: general  Last vitals  BP   Blood Pressure : 104/68    Temp   36.2 °C (97.1 °F)    Pulse   99   Resp   (!) 27    SpO2   92 %      Anesthesia Post Evaluation    Patient location during evaluation: PACU  Patient participation: complete - patient participated  Level of consciousness: awake and alert    Airway patency: patent  Anesthetic complications: no  Cardiovascular status: hemodynamically stable  Respiratory status: acceptable  Hydration status: euvolemic    PONV: none          No complications documented.     Nurse Pain Score: 4 (NPRS)

## 2021-11-22 NOTE — ANESTHESIA PROCEDURE NOTES
Airway    Date/Time: 11/22/2021 8:43 AM  Performed by: Stu Sanchez M.D.  Authorized by: Stu Sanchez M.D.     Location:  OR  Urgency:  Elective  Difficult Airway: No    Indications for Airway Management:  Anesthesia      Spontaneous Ventilation: absent    Sedation Level:  Deep  Preoxygenated: Yes    Patient Position:  Sniffing  Mask Difficulty Assessment:  1 - vent by mask  Final Airway Type:  Endotracheal airway  Final Endotracheal Airway:  ETT  Cuffed: Yes    Technique Used for Successful ETT Placement:  Direct laryngoscopy    Insertion Site:  Oral  Blade Type:  Rich  Laryngoscope Blade/Videolaryngoscope Blade Size:  3  ETT Size (mm):  7.0  Measured from:  Teeth  ETT to Teeth (cm):  21  Placement Verified by: auscultation and capnometry    Cormack-Lehane Classification:  Grade I - full view of glottis  Number of Attempts at Approach:  1

## 2021-11-22 NOTE — OR NURSING
0949: pt arrived to PACU in stable condition. VSS. Dressing to abd is CDI. Pt has been updated on plan of care and all questions have been addressed.     1000: pt sister updated on plan of care.     1020: Report called to Orquidea HOUGH. Pt taken to stage 2 in stable condition.

## 2021-11-22 NOTE — PROGRESS NOTES
Med rec completed per pt   Allergies reviewed    Pt completed a 10 day course of Cefdinir about 1 month ago    Patient has insulin pump with unknown info

## 2021-11-22 NOTE — OR NURSING
Patient arrived to unit at 1125. Patient is AOx4. Transferred to chair appropriately. Patient's pain is 4/10. Declines pain medication at this time. Patient's dressings to abdomen are CDI. Patient voiding appropriately. Tolerating liquids at this time. Discharge instructions discussed with the patient. Patient denies any further questions at this time. Patient discharged home to responsible adult at 1210.

## 2021-11-22 NOTE — DISCHARGE INSTRUCTIONS
ACTIVITY: Rest and take it easy for the first 24 hours.  A responsible adult is recommended to remain with you during that time.  It is normal to feel sleepy.  We encourage you to not do anything that requires balance, judgment or coordination.    MILD FLU-LIKE SYMPTOMS ARE NORMAL. YOU MAY EXPERIENCE GENERALIZED MUSCLE ACHES, THROAT IRRITATION, HEADACHE AND/OR SOME NAUSEA.    FOR 24 HOURS DO NOT:  Drive, operate machinery or run household appliances.  Drink beer or alcoholic beverages.   Make important decisions or sign legal documents.    SPECIAL INSTRUCTIONS:     OK to shower in AM with dressings on DC dressing in 6 days.  Follow-up with office MA for staple removal after 10-14 days    DIET: To avoid nausea, slowly advance diet as tolerated, avoiding spicy or greasy foods for the first day.  Add more substantial food to your diet according to your physician's instructions.  Babies can be fed formula or breast milk as soon as they are hungry.  INCREASE FLUIDS AND FIBER TO AVOID CONSTIPATION.    FOLLOW-UP APPOINTMENT:  A follow-up appointment should be arranged with your doctor in 10-14 days; call to schedule.    You should CALL YOUR PHYSICIAN if you develop:  Fever greater than 101 degrees F.  Pain not relieved by medication, or persistent nausea or vomiting.  Excessive bleeding (blood soaking through dressing) or unexpected drainage from the wound.  Extreme redness or swelling around the incision site, drainage of pus or foul smelling drainage.  Inability to urinate or empty your bladder within 8 hours.  Problems with breathing or chest pain.    You should call 911 if you develop problems with breathing or chest pain.  If you are unable to contact your doctor or surgical center, you should go to the nearest emergency room or urgent care center.  Physician's telephone #: Dr. Jang [phone].    If any questions arise, call your doctor.  If your doctor is not available, please feel free to call the  Surgical Center at [phone]. The Contact Center is open Monday through Friday 7AM to 5PM and may speak to a nurse at [phone], or toll free at [phone].     A registered nurse may call you a few days after your surgery to see how you are doing after your procedure.    MEDICATIONS: Resume taking daily medication.  Take prescribed pain medication with food.  If no medication is prescribed, you may take non-aspirin pain medication if needed.  PAIN MEDICATION CAN BE VERY CONSTIPATING.  Take a stool softener or laxative such as senokot, pericolace, or milk of magnesia if needed.    Prescription given for celecoxib and tramadol.  Last pain medication given at tramadol 50mg at 10:24am.    If your physician has prescribed pain medication that includes Acetaminophen (Tylenol), do not take additional Acetaminophen (Tylenol) while taking the prescribed medication.    Depression / Suicide Risk    As you are discharged from this [facility] facility, it is important to learn how to keep safe from harming yourself.    Recognize the warning signs:  · Abrupt changes in personality, positive or negative- including increase in energy   · Giving away possessions  · Change in eating patterns- significant weight changes-  positive or negative  · Change in sleeping patterns- unable to sleep or sleeping all the time   · Unwillingness or inability to communicate  · Depression  · Unusual sadness, discouragement and loneliness  · Talk of wanting to die  · Neglect of personal appearance   · Rebelliousness- reckless behavior  · Withdrawal from people/activities they love  · Confusion- inability to concentrate     If you or a loved one observes any of these behaviors or has concerns about self-harm, here's what you can do:  · Talk about it- your feelings and reasons for harming yourself  · Remove any means that you might use to hurt yourself (examples: pills, rope, extension cords, firearm)  · Get professional help from the  community (Mental Health, Substance Abuse, psychological counseling)  · Do not be alone:Call your Safe Contact- someone whom you trust who will be there for you.  · Call your local CRISIS HOTLINE 886-9618 or 153-532-2797  · Call your local Children's Mobile Crisis Response Team [place] (019) 314-9494 or www.Routezilla  · Call the toll free National Suicide Prevention Hotlines   · National Suicide Prevention Lifeline 352-970-RQMW (7248)  · National Hope Line Network 800-SUICIDE (884-4827)

## 2021-11-22 NOTE — ANESTHESIA TIME REPORT
Anesthesia Start and Stop Event Times     Date Time Event    11/22/2021 0822 Ready for Procedure     0839 Anesthesia Start     0949 Anesthesia Stop        Responsible Staff  11/22/21    Name Role Begin End    Stu Sanchez M.D. Anesth 0839 0949        Preop Diagnosis (Free Text):  Pre-op Diagnosis     PANCREATIC NEUROENDOCRINE TUMOR WITH LIVER METS        Preop Diagnosis (Codes):    Premium Reason  Non-Premium    Comments:

## 2021-11-22 NOTE — ANESTHESIA PREPROCEDURE EVALUATION
Case: [id number] Date/Time: 11/22/21 0815    Procedure: ABLATION, LESION, LIVER, LAPAROSCOPIC - LOBE (Right )    Pre-op diagnosis: PANCREATIC NEUROENDOCRINE TUMOR WITH LIVER METS    Location: [facility] 10 / SURGERY [facility]    Surgeons: Shahbaz Jang M.D.          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)   (positive) Neoplasm of uncertain behavior of liver      ENDO   (positive) Type II diabetes mellitus (HCC)      Other   (positive) Abnormal MRI of abdomen   (positive) Diabetes mellitus (HCC)   (positive) Retinal disease, right       Physical Exam    Airway   Mallampati: II  TM distance: >3 FB  Neck ROM: full       Cardiovascular - normal exam  Rhythm: regular  Rate: normal  (-) murmur     Dental - normal exam           Pulmonary - normal exam  Breath sounds clear to auscultation     Abdominal    Neurological - normal exam                 Anesthesia Plan    ASA 3   ASA physical status 3 criteria: diabetes - poorly controlled    Plan - general       Airway plan will be ETT    (TAP Block if open procedure)      Induction: intravenous    Postoperative Plan: Postoperative administration of opioids is intended.    Pertinent diagnostic labs and testing reviewed    Informed Consent:    Anesthetic plan and risks discussed with patient.    Use of blood products discussed with: patient whom consented to blood products.

## 2021-11-22 NOTE — OP REPORT
DATE OF SERVICE:  11/22/2021     INDICATIONS:  The patient is a 65-year-old female patient, known to me, who is   status post Whipple procedure 8 years ago for pancreatic neuroendocrine, was   well-differentiated 1.9 cm with 2 of 11 nodes positive.  In any event, she has   been followed closely with surveillance and, at 5 years, her surveillance   went annually and unfortunately 8 years postop, she was found to have a mass   in the liver.  She underwent a biopsy attempt that was done on 10/13, which   came back as metastatic well-differentiated neuroendocrine tumor.  The patient   did undergo a PET DOTATATE and did have uptake in the area of the tumor and   questionable in the area of the pancreas, the remnant portion of the pancreas,   which will be consistent ___ and again a small notable met near the   pancreatic body.  In any event, there is no other metastatic disease.  The   patient was scheduled for possible ablation.     SURGEON:  Shahbaz Jang MD     ASSISTANT SURGEON:  Opal Ayala MD     ANESTHESIOLOGIST:  Stu Sanchez MD     ANESTHESIA:  General and local anesthetic.     ESTIMATED BLOOD LOSS:  Less than 5 mL.     COMPLICATIONS:  No complications.     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     PROCEDURES:  1.  Exploratory laparoscopy/diagnostic laparoscopy showing no sign of   metastatic disease.  2.  Intraoperative ultrasound survey of the right lobe of the liver showing a   2-3 cm tumor obstructing the initial branching to the anterior and posterior   pedicle right lobe involving the bile duct causing biliary ductal dilatation.  3.  Laparoscopic enterolysis due to multiple dense adhesions post-Whipple   procedure.     CASE CLASS:  This is a clean case.     DESCRIPTION OF PROCEDURE:  The patient was brought to OR,  placed under general   anesthetic with both arms out, shaved, prepped with chlorhexidine prep and   sterile drapes, given preoperative antibiotics.  Timeout was done, which was   adequate.  At that point, she was given 5 mL of 0.5% Marcaine with epinephrine   in the lower midline incision just below the umbilicus.  Incision made with   11 blade and Bovie electrocautery.  Fortunately, we were able to enter the   abdomen atraumatically.  Placed a 0 Vicryl suture around the fascia and ___   Conchis trocar.  We insufflated the abdomen to 15 mm of pressure.  She was   placed in reverse Trendelenburg and then under direct vision, we placed the   right lower quadrant, a 5 mm port.  We then moved the camera to the 5 mm port   and inspected out pervious Conchis and showed no trauma or bleeding and no   injury to bowel.  The patient was noted to have significant amount of   adhesions of the omentum anteriorly.  We then used the Thunderbeat to take   down these adhesions starting laterally and worked our way medially.  We   continued to do this until we could see the complete right abdomen, right lobe   of the liver.  We then placed a 5/12 MHz articulating laparoscopic probe into   the abdomen and surveyed the liver and noted on the liver was what appeared   to be dilation of the biliary system, both in the anterior and posterior   sectors on the right.  The mass appeared to be right at the junction of the   right anterior and posterior along the pedicle and it would have direct   invasion or intimacy with the portal vein and the right lobe.  Because of this   ablation was not an option.  Otherwise, we would most likely injure the main   right portal pedicle due to the heat thermal injury.  We did a further   ultrasound of the rest of the liver.  No other tumor or masses were   identified.  No peritoneal tumors were identified either.  At that point,   because of the findings and after having taken down these adhesions, we then    proceeded with aborting the procedure and desufflating the abdomen.  We tied   the preplaced 0 Vicryl suture in the umbilicus, gave a total of 30 mL of 0.5%   Marcaine with epinephrine throughout the area of the soft tissue, skin and   fascia and the skin was closed with staples, 2x2 Tegaderms, extubated and   transferred to recovery room.        ______________________________  MD MATT Mccloud/RORY/JESÚS    DD:  11/22/2021 10:11  DT:  11/22/2021 10:42    Job#:  [id number]    CC:Brigitte Salmon DO(User)

## 2021-11-30 DIAGNOSIS — C7B.8 METASTATIC MALIGNANT NEUROENDOCRINE TUMOR TO LIVER (HCC): ICD-10-CM

## 2021-11-30 DIAGNOSIS — R10.9 ABDOMINAL PAIN, UNSPECIFIED ABDOMINAL LOCATION: ICD-10-CM

## 2021-11-30 DIAGNOSIS — C78.7 METASTASIS TO LIVER (HCC): ICD-10-CM

## 2021-11-30 DIAGNOSIS — D37.6 NEOPLASM OF UNCERTAIN BEHAVIOR OF LIVER: ICD-10-CM

## 2021-12-06 ENCOUNTER — NON-PROVIDER VISIT (OUTPATIENT)
Dept: SURGERY | Facility: MEDICAL CENTER | Age: 66
End: 2021-12-06
Payer: MEDICARE

## 2021-12-06 NOTE — NON-PROVIDER
Denise Gaines is here for a Non-Provider Visit for Staple Removal.     Staple were placed by Dr. Jang on date: 11/22/21.  Skin is healed: Yes  Provider notified if skin is not healed, or if there is redness, heat, pain, or drainage from incision: Yes  Staple removed.   Steristips are placed: Yes     Advised to keep clean and dry, wash with anti-bacterial soap and water, avoid any creams or lotions.

## 2021-12-14 ENCOUNTER — APPOINTMENT (OUTPATIENT)
Dept: RADIOLOGY | Facility: MEDICAL CENTER | Age: 66
End: 2021-12-14
Attending: SURGERY
Payer: MEDICARE

## 2021-12-14 DIAGNOSIS — R10.9 ABDOMINAL PAIN, UNSPECIFIED ABDOMINAL LOCATION: ICD-10-CM

## 2021-12-14 DIAGNOSIS — C7B.8 METASTATIC MALIGNANT NEUROENDOCRINE TUMOR TO LIVER (HCC): ICD-10-CM

## 2021-12-14 DIAGNOSIS — C78.7 METASTASIS TO LIVER (HCC): ICD-10-CM

## 2021-12-19 ENCOUNTER — HOSPITAL ENCOUNTER (OUTPATIENT)
Dept: RADIOLOGY | Facility: MEDICAL CENTER | Age: 66
End: 2021-12-19
Attending: SURGERY
Payer: MEDICARE

## 2021-12-19 PROCEDURE — A9576 INJ PROHANCE MULTIPACK: HCPCS | Performed by: SURGERY

## 2021-12-19 PROCEDURE — 74183 MRI ABD W/O CNTR FLWD CNTR: CPT | Mod: MG

## 2021-12-19 PROCEDURE — 700117 HCHG RX CONTRAST REV CODE 255: Performed by: SURGERY

## 2021-12-19 RX ADMIN — GADOTERIDOL 15 ML: 279.3 INJECTION, SOLUTION INTRAVENOUS at 11:12

## 2021-12-22 NOTE — PROGRESS NOTES
"Interventional Oncology Consultation      Re: Denise Gaines     MRN: [id number]   : 1955    Denise Gaines was referred by Shahbaz Jang MD. She is a 66 y.o. 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 She is also under the care of Edward Rubin MD, Sylvia Coleman MD, Erin BEARD.     History of Present Illness:   presents with neuroendocrine tumor metastatic to the liver. She has a history of pancreatic cancer first diagnosed in . Pertinent interventions include:  · 17  pancreaticoduodenectomy, Gwyn Jang   · 21 enterolysis, aborted liver ablation, Gwyn Jang     has been referred to interventional radiology for evaluation and management with locoregional therapy. Today, she denies flushing but reports night sweats and diarrhea. She reports exertional dyspnea, dizziness, and falls. If she is exerting herself she will see \"black spots\" and have to sit down. She reports chronically low blood pressure. She states her blood glucose levels are difficult to control with her pump and run high. She is not on any systemic cancer therapy. She is accompanied by her sister Elizabeth today. She lives with her son. She reports fatigue and sleeps 10-11 hours at night and takes a daily 2-3 hour nap.    She is seen today for review of imaging studies and discussion of possible intervention with image-guided ablation, drug eluting bead chemoembolization, or Y90  radioembolization.     Past Medical History:   Diagnosis Date   • Adverse effect of anesthesia     Pt was very sick after surgery on 2021 under a local    • Anesthesia     PONV/ slow to emerge per pt   • Arthritis 17    Bilateral shoulders and both hands-osteo   • ASTHMA     allergy induced-uses inhalers prn/flonase   • Blood clotting disorder (HCC) dx 10/2016    Right eye retina-Treated with " laser and injections   • Bowel habit changes     Diarrhea chronic/ fatty stools at times   • Bronchitis 12/2017   • Cancer (HCC) 12/2013-dx    pancreatic CA. Whipple procedure 1/2014   • Carotid artery plaque 2016    per ultrasound   • Cataract 10/11/2021    Bilateral-early stages   • Colitis since age 14   • Delayed emergence from general anesthesia    • Diabetes (HCC) 2014    post whipple procedure/ uses humalog, insuliln pump   • Heart burn     treated with nexium   • Heart murmur     since birth, slight   • High cholesterol    • Indigestion     takes creon digestive enzymes   • MRSA (methicillin resistant Staphylococcus aureus) 11/2016    Left hand-cleared    • MRSA (methicillin resistant Staphylococcus aureus) 1/30/2017    Back of left thigh-cleared.   • Neuropathy 07/2021    viky hands and feet   • Osteoporosis    • Pneumonia 2012    gets a pneumovax   • PONV (postoperative nausea and vomiting)     Pt was very sick after surgery on 8/4/2021   • Psychiatric problem     depression/ anxiety    • Snoring     sinus issues   • Unspecified urinary incontinence     uses pad   • Urinary bladder disorder     incontinence     Past Surgical History:   Procedure Laterality Date   • HEPATIC ABLATION LAPAROSCOPIC Bilateral 11/22/2021    Procedure: LESION, LIVER, DIAGNOSTIC LAPAROSCOPIC - LOBE intra operative ultrasound;  Surgeon: Shahbaz Jang M.D.;  Location: SURGERY [facility];  Service: General   • PB INCISE FINGER TENDON SHEATH Right 8/4/2021    Procedure: RELEASE, TRIGGER FINGER - THUMB, A1 UNRULY.;  Surgeon: Drew Duong M.D.;  Location: SURGERY [facility];  Service: Orthopedics   • PB SHLDR ARTHROSCOP,PART ACROMIOPLAS Right 1/10/2020    Procedure: DECOMPRESSION, SHOULDER, ARTHROSCOPIC - SUBACROMIAL W/LABRAL DEBRIDEMENT, SUPERIOR CAPSULAR RECONSTRUCTION, VALLADARES;  Surgeon: Mei Palacios M.D.;  Location: SURGERY [facility];  Service: Orthopedics   • BICEPS TENODESIS Right 1/10/2020     Procedure: TENODESIS, BICEPS;  Surgeon: Mei Palacios M.D.;  Location: [facility];  Service: Orthopedics   • ROTATOR CUFF REPAIR Right 1/10/2020    Procedure: REPAIR, ROTATOR CUFF;  Surgeon: Mei Palacios M.D.;  Location: [facility];  Service: Orthopedics   • GASTROSCOPY  3/15/2018    Procedure: GASTROSCOPY - POSS BIOPSY, DILATION, POLYPECTOMY, CONTROL OF HEMORRHAGE;  Surgeon: Edward Rubin M.D.;  Location: [facility];  Service: EUS   • EGD W/ENDOSCOPIC ULTRASOUND  3/15/2018    Procedure: EGD W/ENDOSCOPIC ULTRASOUND;  Surgeon: Edward Rubin M.D.;  Location: [facility];  Service: EUS   • EGD WITH ASP/BX  3/15/2018    Procedure: EGD WITH ASP/BX;  Surgeon: Edward Rubin M.D.;  Location: [facility];  Service: EUS   • GASTROSCOPY N/A 4/13/2017    Procedure: GASTROSCOPY - W/POSS BIOPSY, DILATION, POLYPECTOMY, CONTROL OF HEMORRHAGE;  Surgeon: Edward Rubin M.D.;  Location: [facility];  Service:    • EGD W/ENDOSCOPIC ULTRASOUND N/A 4/13/2017    Procedure: EGD W/ENDOSCOPIC ULTRASOUND;  Surgeon: Edward Rubin M.D.;  Location: [facility];  Service:    • COLONOSCOPY  2016    x 2   • ERCP-DIAGNOSTIC W/BIOPSY  8/2015    several to verify no return of pancreatic CA    • HERNIA REPAIR  7/2014    5 umbilical repaired at same time   • LAPAROSCOPY  1/27/2014    Performed by Shahbaz Jang M.D. at SUR[facility]ERY [facility]   • WHIPPLE PROCEDURE  1/27/2014    Performed by Shahbaz Jang M.D. at [facility]   • NODE DISSECTION  1/27/2014    Performed by Shahbaz Jang M.D. at [facility]   • TONSILLECTOMY  2011   • OTHER ORTHOPEDIC SURGERY Left 2009    Nerve damage S/P lumbar surgery-foot broken and pinned.    • LUMBAR LAMINECTOMY DISKECTOMY  2008    L5   • SHOULDER ARTHROSCOPY W/ ROTATOR CUFF REPAIR Right 2006   • CARPAL TUNNEL RELEASE Right 2005   • BLADDER SUSPENSION   2003   • HYSTERECTOMY, TOTAL ABDOMINAL  1990 11/22/21 at [facility]:  PROCEDURES:  1.  Exploratory laparoscopy/diagnostic laparoscopy showing no sign of   metastatic disease.  2.  Intraoperative ultrasound survey of the right lobe of the liver showing a   2-3 cm tumor obstructing the initial branching to the anterior and posterior   pedicle right lobe involving the bile duct causing biliary ductal dilatation.  3.  Laparoscopic enterolysis due to multiple dense adhesions post-Whipple   procedure.     4/13/17 at [facility] ([place]):   POST-PROCEDURE DIAGNOSES:  1.  Normal esophagogastrojejunoscopy with post-surgical anatomy consistent   with gastrojejunal anastomosis, appearing healthy and intact with appreciation   of normal afferent and efferent limbs.  2.  Endoscopic ultrasound of the pancreas, examination of the body and tail of   the pancreas appearing within normal limits.    1/27/14 at [facility] ([place]):  PROCEDURE DONE:  1.  Exploratory laparoscopy/staging laparoscopy procedure.  2.  Open laparotomy procedure.  3.  Standard pancreaticoduodenectomy procedure.  4.  Celiac portal node dissection.  5.  Omental flap.  6.  Intraoperative ultrasound.  7.  Vein repair done by Dr. Doty.    Social History     Socioeconomic History   • Marital status: Single     Spouse name: Not on file   • Number of children: Not on file   • Years of education: Not on file   • Highest education level: Not on file   Occupational History   • Not on file   Tobacco Use   • Smoking status: Never Smoker   • Smokeless tobacco: Never Used   Vaping Use   • Vaping Use: Never used   Substance and Sexual Activity   • Alcohol use: Not Currently   • Drug use: Yes     Types: Marijuana     Comment: does use CBD oil topical/with THC occasional orally  - last used weeks ago since 11/22/21   • Sexual activity: Not on file   Other Topics Concern   • Not on file   Social History Narrative   • Not on file     Social Determinants of Health     Financial  Resource Strain:    • Difficulty of Paying Living Expenses: Not on file   Food Insecurity:    • Worried About Running Out of Food in the Last Year: Not on file   • Ran Out of Food in the Last Year: Not on file   Transportation Needs:    • Lack of Transportation (Medical): Not on file   • Lack of Transportation (Non-Medical): Not on file   Physical Activity:    • Days of Exercise per Week: Not on file   • Minutes of Exercise per Session: Not on file   Stress:    • Feeling of Stress : Not on file   Social Connections:    • Frequency of Communication with Friends and Family: Not on file   • Frequency of Social Gatherings with Friends and Family: Not on file   • Attends [employer] Services: Not on file   • Active Member of Clubs or Organizations: Not on file   • Attends Club or Organization Meetings: Not on file   • Marital Status: Not on file   Intimate Partner Violence:    • Fear of Current or Ex-Partner: Not on file   • Emotionally Abused: Not on file   • Physically Abused: Not on file   • Sexually Abused: Not on file   Housing Stability:    • Unable to Pay for Housing in the Last Year: Not on file   • Number of Places Lived in the Last Year: Not on file   • Unstable Housing in the Last Year: Not on file     Family History   Problem Relation Age of Onset   • Lung Cancer Mother    • Dementia Father        Review of Systems   Constitutional: Positive for malaise/fatigue.   Eyes:        Positive for visual spots with activity.    Respiratory: Positive for shortness of breath.    Cardiovascular: Negative.    Gastrointestinal: Positive for diarrhea. Negative for abdominal pain and constipation.   Musculoskeletal: Positive for falls.   Neurological: Positive for dizziness.   Psychiatric/Behavioral: Negative for substance abuse.     A comprehensive 14-point review of systems was negative except as described above.     Labs:      Ref. Range 11/17/2021 08:54   WBC Latest Ref Range: 4.8 - 10.8 K/uL 5.0   RBC Latest Ref Range:  4.20 - 5.40 M/uL 4.51   Hemoglobin Latest Ref Range: 12.0 - 16.0 g/dL 13.5   Hematocrit Latest Ref Range: 37.0 - 47.0 % 41.1   MCV Latest Ref Range: 81.4 - 97.8 fL 91.1   MCH Latest Ref Range: 27.0 - 33.0 pg 29.9   MCHC Latest Ref Range: 33.6 - 35.0 g/dL 32.8 (L)   RDW Latest Ref Range: 35.9 - 50.0 fL 48.7   Platelet Count Latest Ref Range: 164 - 446 K/uL 170   MPV Latest Ref Range: 9.0 - 12.9 fL 10.4   Neutrophils-Polys Latest Ref Range: 44.00 - 72.00 % 50.80   Neutrophils (Absolute) Latest Ref Range: 2.00 - 7.15 K/uL 2.54   Lymphocytes Latest Ref Range: 22.00 - 41.00 % 36.60   Lymphs (Absolute) Latest Ref Range: 1.00 - 4.80 K/uL 1.83   Monocytes Latest Ref Range: 0.00 - 13.40 % 6.60   Monos (Absolute) Latest Ref Range: 0.00 - 0.85 K/uL 0.33   Eosinophils Latest Ref Range: 0.00 - 6.90 % 4.80   Eos (Absolute) Latest Ref Range: 0.00 - 0.51 K/uL 0.24   Basophils Latest Ref Range: 0.00 - 1.80 % 1.00   Baso (Absolute) Latest Ref Range: 0.00 - 0.12 K/uL 0.05   Immature Granulocytes Latest Ref Range: 0.00 - 0.90 % 0.20   Immature Granulocytes (abs) Latest Ref Range: 0.00 - 0.11 K/uL 0.01   Nucleated RBC Latest Units: /100 WBC 0.00   NRBC (Absolute) Latest Units: K/uL 0.00   Sodium Latest Ref Range: 135 - 145 mmol/L 137   Potassium Latest Ref Range: 3.6 - 5.5 mmol/L 3.9   Chloride Latest Ref Range: 96 - 112 mmol/L 105   Co2 Latest Ref Range: 20 - 33 mmol/L 23   Anion Gap Latest Ref Range: 7.0 - 16.0  9.0   Glucose Latest Ref Range: 65 - 99 mg/dL 206 (H)   Bun Latest Ref Range: 8 - 22 mg/dL 14   Creatinine Latest Ref Range: 0.50 - 1.40 mg/dL 0.41 (L)   GFR If  Latest Ref Range: >60 mL/min/1.73 m 2 >60   GFR If Non  Latest Ref Range: >60 mL/min/1.73 m 2 >60   Calcium Latest Ref Range: 8.5 - 10.5 mg/dL 8.6   AST(SGOT) Latest Ref Range: 12 - 45 U/L 110 (H)   ALT(SGPT) Latest Ref Range: 2 - 50 U/L 88 (H)   Alkaline Phosphatase Latest Ref Range: 30 - 99 U/L 455 (H)   Total Bilirubin Latest Ref  Range: 0.1 - 1.5 mg/dL 0.4   Albumin Latest Ref Range: 3.2 - 4.9 g/dL 3.4   Total Protein Latest Ref Range: 6.0 - 8.2 g/dL 6.5   Globulin Latest Ref Range: 1.9 - 3.5 g/dL 3.1   A-G Ratio Latest Units: g/dL 1.1   INR Latest Ref Range: 0.87 - 1.13  1.04   PT Latest Ref Range: 12.0 - 14.6 sec 13.3   SARS-CoV-2 by PCR Unknown NotDetected   SARS-CoV-2 Source Unknown Nasal Swab       Pathology:  10/13/21 at [facility]:  A. Liver mass biopsy cores:          Metastatic well-differentiated neuroendocrine tumor consistent           with the patient's known pancreatic primary.       Comment: If additional studies are requested, please utilize Block A1   which contains sufficient tumor cells for further testing.     3/15/18 at [facility]:  A. Pancreatic bed slides/remnants:          No evidence of malignancy.          The ThinPrep slide demonstrates predominantly histiocytes, small           lymphocytes and neutrophils.          The smears demonstrate background blood and similar areas of           small lymphocytes, histiocytes and neutrophils. A few clusters           of marked increased numbers of neutrophils are noted suggestive           for abscess tissue. Scattered cohesive groups of benign           columnar epithelium are noted consistent with gastrointestinal           contaminants.          No atypical or malignant cells are seen.   B. Pancreatic bed core:          No evidence of malignancy.          Several core biopsies of fibrous tissue demonstrating marked           acute and chronic inflammation. There are numerous core           biopsies of blood clot showing focal marked acute inflammation.          Several fragments of benign glandular epithelium representing           gastrointestinal contaminants are also noted.          No unequivocal atypical or malignant cells are seen.   C. Tail of pancreas mass slides/remnants:          No evidence of malignancy.          The ThinPrep slide demonstrates predominantly small  lymphocytes,           histiocytes and a few neutrophils.          The smears demonstrate background blood and focal marked           increased numbers of small mature lymphocytes consistent with           aspiration of a lymph node.          No atypical or malignant cells are seen.     D. Tail of pancreas mass core:          No evidence of malignancy.          Several core biopsies of fibrous and lymphoid tissue           demonstrating numerous small mature lymphocytes, suggestive for           biopsy of a lymph node.          There are also numerous bloody core biopsy fragments showing           similar small lymphocytes. No obvious epithelial cells are           seen.          Immunohistochemical stains demonstrate no significant numbers of           keratin positive cells. The CD3 and CD20 immunohistochemical           stained slides demonstrate a mixture of small T small B           lymphocytes consistent with reactive benign lymphoid tissue.           The CD20 immunohistochemical stain shows portions of apparent           reactive lymphoid follicles.          No atypical or malignant cells are seen.          See comment.     Comment: Pertinent H&E and immunohistochemical stained slides from the   pancreatic bed and pancreatic tail mass needle core biopsies are   reviewed with Dr. Martinez and Dr. Valenzuela, with agreement that no   malignancy or unequivocal recurrent well-differentiated neuroendocrine   tumor is present.    1/27/14 at [facility]:  A. Celiac nodes:          Three benign lymph nodes (0/3).   B. Gallbladder:          Benign gallbladder with mild chronic cholecystitis,           cholesterolosis, and cholelithiasis.   C. Common hepatic duct margin:          Cauterized bile duct wall with denudation/ulceration of the           surface epithelium, inflammation, and reactive fibrosis.          No malignancy identified.   D. Whipple:          Well-differentiated neuroendocrine tumor measuring 1.9 cm in            maximal dimension (see comment).          Extension into the peripancreatic adipose tissue is identified.          Tumor focally involves the uncinate margin.          Tumor with associated fibrosis extends less than 1mm from the           intrapancreatic portion of the common bile duct.          Chronic pancreatitis with fibrosis is present at the parenchymal           margin.   E. Antrum:          Portion of benign gastric wall and duodenum.          One benign lymph node identified in the attached adipose tissue           (0/1).     Radiology:   MRI abdomen on 12/19/21 at [facility]:  1. Known lesion at the junction of hepatic segments 7 and 8 is stable to minimally smaller. Stable associated intrahepatic biliary dilatation.  2. A second 1.2 cm lesion in the hepatic segment 8, near the dome, highly concerning for a second metastasis. It is likely stable in retrospect.  3. No other liver lesions.  4. No pancreatic lesions detected.  5. Stable sequela of pancreaticoduodenectomy.    Image guided biopsy 10/13/21 at [facility] ([facility]):  1.  CT guided 20 gauge core biopsy of the right lobe of the liver targeting a 28 mm lesion which was best seen on MRI.    PET CT Neuroendocrine 10/26/21 at [facility]:  1. Solitary hypermetabolic hepatic metastasis, corresponding to the lesion seen on the recent MRI.  2. No other hypermetabolic liver lesions.  3. Small hypermetabolic retroperitoneal elizabeth metastases, measuring up to 0.7 cm short axis.  4. No metastatic disease in the chest.  5. Sequela of pancreaticoduodenectomy, with a stent in the pancreatic body.    CT abdomen 10/16/21 at [facility]:  1.  No acute abnormality in the abdomen or pelvis.  2.  Colonic diverticulosis.  3.  Postsurgical changes within the abdomen, including those of pancreaticoduodenectomy.  4.  No liver lesions are appreciated on today's study.    Current Outpatient Medications   Medication Sig Dispense Refill   • guaiFENesin ER (MUCINEX) 600 MG TABLET SR 12 HR Take  600 mg by mouth every day.     • traMADol (ULTRAM) 50 MG Tab Take 1-2 Tablets by mouth every four hours as needed for Severe Pain for up to 40 days. 30 Tablet 0   • celecoxib (CELEBREX) 200 MG Cap Take 1 Capsule by mouth 2 times a day. 60 Capsule 1   • cefdinir (OMNICEF) 300 MG Cap Take 1 Capsule by mouth 2 times a day. 20 Capsule 0   • ibuprofen (MOTRIN) 200 MG Tab Take 600-800 mg by mouth every 8 hours as needed for Mild Pain.     • Ketotifen Fumarate (ALLERGY EYE DROPS OP) Administer 1 Drop into both eyes as needed (Itchy eyes).     • insulin infusion pump Device Inject  under the skin continuous. Patient's own SQ insulin pump with automatically adjusting sensor  Tandum control pump  Type of Insulin: Humalog 100 units/ml  Last change of tubin2021 - Change tubing and site every 72 hours   Control 32iU    Dosin day average  Basal rate:19 units/day  Bolus: 18 units/day  Correction ratio:5 units/day    Disconnect pump if patient becomes hypoglycemic and altered.     • Multiple Minerals-Vitamins (CALCIUM-MAGNESIUM-ZINC-D3) Tab Take 2 Tablets by mouth every day.     • albuterol (VENTOLIN HFA) 108 (90 Base) MCG/ACT Aero Soln inhalation aerosol Inhale 1-2 Puffs every 6 hours as needed for Shortness of Breath.     • fluticasone (FLONASE) 50 MCG/ACT nasal spray Spray 1 Spray in nose 2 times a day.     • atorvastatin (LIPITOR) 10 MG Tab Take 10 mg by mouth every evening.     • esomeprazole (NEXIUM) 40 MG delayed-release capsule Take 40 mg by mouth every evening.     • B Complex Vitamins (B COMPLEX 1 PO) Take 1 Tablet by mouth every day.     • Probiotic Product (PROBIOTIC ADVANCED PO) Take 1 Cap by mouth every evening.     • Multiple Vitamins-Minerals (MULTIVITAMIN ADULT PO) Take 1 Tab by mouth every day.     • Pancrelipase, Lip-Prot-Amyl, (CREON) 55465 UNITS Cap DR Particles Take 2 Tabs by mouth 3 times a day, with meals.     • sertraline (ZOLOFT) 100 MG TABS Take 150 mg by mouth every morning.       No current  facility-administered medications for this visit.       Allergies   Allergen Reactions   • Bee Anaphylaxis     Wasps   • Clindamycin Hives and Rash     Head to toe rash like chicken pox for 4 months.   • Penicillins Anaphylaxis   • Sulfa Drugs Hives and Itching     Rash hives itching   • Tape Rash     Rash and welt at site  PAPER TAPE OK   • Hydrocodone-Acetaminophen Vomiting   • Oxycodone-Acetaminophen Nausea   • Codeine Vomiting   • Percocet [Oxycodone-Acetaminophen] Vomiting   • Vicodin [Hydrocodone-Acetaminophen] Vomiting       Physical Exam  Constitutional:       General: She is not in acute distress.     Appearance: She is not diaphoretic.   HENT:      Head: Normocephalic.   Eyes:      General: No scleral icterus.  Pulmonary:      Effort: Pulmonary effort is normal. No respiratory distress.   Abdominal:      General: There is no distension.   Skin:     General: Skin is warm and dry.      Coloration: Skin is not pale.      Findings: No erythema or rash.   Neurological:      General: No focal deficit present.      Mental Status: She is alert and oriented to person, place, and time. She is not disoriented.      Cranial Nerves: No cranial nerve deficit or facial asymmetry.      Sensory: Sensation is intact.      Motor: No weakness or tremor.      Coordination: Coordination normal.      Gait: Gait is intact. Gait normal.   Psychiatric:         Mood and Affect: Mood and affect normal.         Behavior: Behavior normal.         Thought Content: Thought content normal.         Cognition and Memory: Memory normal.         Judgment: Judgment normal.     ECOG Performance Status 1    Impression:   1. Unresectable metastatic neuroendocrine tumor to liver.   2. Insulin dependent diabetes mellitus.  3. Hyperlipidemia.  4. Diarrhea.  5. Post procedure nausea and vomiting.   6. Exertional dyspnea.     Plan:   Diogo Hardy MD has reviewed 's history and imaging studies, examined the patient, and discussed treatment  options.  is a candidate for palliative transarterial radioembolization of unresectable metastatic neuroendocrine tumors to her right. We discussed the method of the procedure at length including angiography and embolization as well as the expected clinical course with the possibility of post-embolization syndrome nausea and pain and hospitalization. We explained that this procedure is palliative and not curative. We discussed the possibility of tumor recurrence and development of future tumors.  We additionally discussed the risks, including bleeding and infection, damage to the arteries or adjacent tissue, reaction to any medications given during the procedure, potential side effects of contrast administration including renal damage, non-target embolization, radiation-induced ulcers or liver disease in the setting of radioembolization, liver failure, and death. There is a risk the procedure will not be effective. We discussed alternatives to the procedure including surveillance with no intervention and referral to a medical oncologist for consideration of systemic therapy, which she declined today. The patient verbalizes understanding of risks, benefits, and alternatives to IR intervention and elects to proceed. All questions were answered. Written pre- and post- procedure care instructions were provided. We explained that the patient will need to have ongoing surveillance after the procedure, which will be managed by the treating team, and that future treatment depends on multiple factors including lab studies, imaging, and performance status.     The plan is as follows, pending insurance authorization:  · Continue Nexium.  · Mapping angiogram on January 25.   · Y90 SIRT (lobar) January 31.  · Follow up in 2 weeks for lab evaluation.  · Imaging at 3 months or at the direction of her surgical oncologist.    KISHA Cochran with Diogo Hardy MD  Interventional Radiology   [facility]  [address])  [place] [id number]  [phone]

## 2022-01-06 ENCOUNTER — TELEPHONE (OUTPATIENT)
Dept: SCHEDULING | Facility: IMAGING CENTER | Age: 67
End: 2022-01-06

## 2022-01-07 ENCOUNTER — HOSPITAL ENCOUNTER (OUTPATIENT)
Facility: MEDICAL CENTER | Age: 67
End: 2022-01-07
Attending: RADIOLOGY | Admitting: RADIOLOGY
Payer: MEDICARE

## 2022-01-07 ENCOUNTER — HOSPITAL ENCOUNTER (OUTPATIENT)
Dept: RADIOLOGY | Facility: MEDICAL CENTER | Age: 67
End: 2022-01-07
Attending: SURGERY
Payer: MEDICARE

## 2022-01-07 DIAGNOSIS — C78.7 METASTASIS TO LIVER (HCC): ICD-10-CM

## 2022-01-07 DIAGNOSIS — C7B.8 METASTATIC MALIGNANT NEUROENDOCRINE TUMOR TO LIVER (HCC): ICD-10-CM

## 2022-01-07 DIAGNOSIS — R10.9 ABDOMINAL PAIN, UNSPECIFIED ABDOMINAL LOCATION: ICD-10-CM

## 2022-01-07 ASSESSMENT — ENCOUNTER SYMPTOMS
SHORTNESS OF BREATH: 1
FALLS: 1
DIARRHEA: 1
CARDIOVASCULAR NEGATIVE: 1
CONSTIPATION: 0
DIZZINESS: 1
ABDOMINAL PAIN: 0

## 2022-01-07 ASSESSMENT — LIFESTYLE VARIABLES: SUBSTANCE_ABUSE: 0

## 2022-01-11 ENCOUNTER — OFFICE VISIT (OUTPATIENT)
Dept: MEDICAL GROUP | Facility: PHYSICIAN GROUP | Age: 67
End: 2022-01-11
Payer: MEDICARE

## 2022-01-11 VITALS
OXYGEN SATURATION: 95 % | HEART RATE: 84 BPM | RESPIRATION RATE: 16 BRPM | TEMPERATURE: 97.3 F | BODY MASS INDEX: 28.85 KG/M2 | SYSTOLIC BLOOD PRESSURE: 120 MMHG | DIASTOLIC BLOOD PRESSURE: 78 MMHG | HEIGHT: 61 IN | WEIGHT: 152.8 LBS

## 2022-01-11 DIAGNOSIS — K21.9 GASTROESOPHAGEAL REFLUX DISEASE, UNSPECIFIED WHETHER ESOPHAGITIS PRESENT: ICD-10-CM

## 2022-01-11 DIAGNOSIS — E10.49 OTHER DIABETIC NEUROLOGICAL COMPLICATION ASSOCIATED WITH TYPE 1 DIABETES MELLITUS (HCC): ICD-10-CM

## 2022-01-11 DIAGNOSIS — E78.49 OTHER HYPERLIPIDEMIA: ICD-10-CM

## 2022-01-11 DIAGNOSIS — M81.0 OSTEOPOROSIS, UNSPECIFIED OSTEOPOROSIS TYPE, UNSPECIFIED PATHOLOGICAL FRACTURE PRESENCE: ICD-10-CM

## 2022-01-11 DIAGNOSIS — I65.23 BILATERAL CAROTID ARTERY STENOSIS: ICD-10-CM

## 2022-01-11 DIAGNOSIS — J45.909 MILD ASTHMA WITHOUT COMPLICATION, UNSPECIFIED WHETHER PERSISTENT: ICD-10-CM

## 2022-01-11 DIAGNOSIS — C7B.8 METASTATIC MALIGNANT NEUROENDOCRINE TUMOR TO LIVER (HCC): ICD-10-CM

## 2022-01-11 DIAGNOSIS — C25.9 MALIGNANT NEOPLASM OF PANCREAS, UNSPECIFIED LOCATION OF MALIGNANCY (HCC): ICD-10-CM

## 2022-01-11 DIAGNOSIS — R29.6 RECURRENT FALLS: ICD-10-CM

## 2022-01-11 DIAGNOSIS — Z85.07 HISTORY OF PANCREATIC CANCER: ICD-10-CM

## 2022-01-11 DIAGNOSIS — C25.0 MALIGNANT NEOPLASM OF HEAD OF PANCREAS (HCC): ICD-10-CM

## 2022-01-11 DIAGNOSIS — E10.69 TYPE 1 DIABETES MELLITUS WITH OTHER SPECIFIED COMPLICATION (HCC): ICD-10-CM

## 2022-01-11 DIAGNOSIS — F32.5 MAJOR DEPRESSIVE DISORDER IN REMISSION, UNSPECIFIED WHETHER RECURRENT (HCC): ICD-10-CM

## 2022-01-11 DIAGNOSIS — H35.9: ICD-10-CM

## 2022-01-11 PROBLEM — T78.40XA ALLERGIES: Status: ACTIVE | Noted: 2022-01-11

## 2022-01-11 PROBLEM — E11.9 TYPE II DIABETES MELLITUS (HCC): Status: RESOLVED | Noted: 2018-04-05 | Resolved: 2022-01-11

## 2022-01-11 PROBLEM — R93.5 ABNORMAL MRI OF ABDOMEN: Status: RESOLVED | Noted: 2021-10-06 | Resolved: 2022-01-11

## 2022-01-11 PROCEDURE — 99214 OFFICE O/P EST MOD 30 MIN: CPT | Performed by: NURSE PRACTITIONER

## 2022-01-11 ASSESSMENT — ENCOUNTER SYMPTOMS
WEAKNESS: 1
WEIGHT LOSS: 0
BLOOD IN STOOL: 0
FEVER: 0
SHORTNESS OF BREATH: 1
DIARRHEA: 1
NAUSEA: 1
TINGLING: 1
DEPRESSION: 0
CHILLS: 0
CONSTIPATION: 0
PALPITATIONS: 1
ABDOMINAL PAIN: 1
BLURRED VISION: 1
HEADACHES: 0
COUGH: 0
FALLS: 1
TREMORS: 1
VOMITING: 0
DIZZINESS: 1

## 2022-01-11 ASSESSMENT — PATIENT HEALTH QUESTIONNAIRE - PHQ9
9. THOUGHTS THAT YOU WOULD BE BETTER OFF DEAD, OR OF HURTING YOURSELF: NOT AT ALL
1. LITTLE INTEREST OR PLEASURE IN DOING THINGS: NOT AT ALL
7. TROUBLE CONCENTRATING ON THINGS, SUCH AS READING THE NEWSPAPER OR WATCHING TELEVISION: SEVERAL DAYS
3. TROUBLE FALLING OR STAYING ASLEEP OR SLEEPING TOO MUCH: NOT AT ALL
4. FEELING TIRED OR HAVING LITTLE ENERGY: NEARLY EVERY DAY
6. FEELING BAD ABOUT YOURSELF - OR THAT YOU ARE A FAILURE OR HAVE LET YOURSELF OR YOUR FAMILY DOWN: NOT AL ALL
SUM OF ALL RESPONSES TO PHQ9 QUESTIONS 1 AND 2: 0
2. FEELING DOWN, DEPRESSED, IRRITABLE, OR HOPELESS: NOT AT ALL
5. POOR APPETITE OR OVEREATING: MORE THAN HALF THE DAYS
SUM OF ALL RESPONSES TO PHQ QUESTIONS 1-9: 6
8. MOVING OR SPEAKING SO SLOWLY THAT OTHER PEOPLE COULD HAVE NOTICED. OR THE OPPOSITE, BEING SO FIGETY OR RESTLESS THAT YOU HAVE BEEN MOVING AROUND A LOT MORE THAN USUAL: NOT AT ALL

## 2022-01-11 ASSESSMENT — FIBROSIS 4 INDEX: FIB4 SCORE: 4.55

## 2022-01-11 NOTE — ASSESSMENT & PLAN NOTE
Chronic and stbale conditon   Follows with ophthalmology dr Isaac  Gets injections of some sort- believes it is a chemo drug to right eye only   Denies any new changes in vision but has noted when she is getting tired she notices black dots in vision with white centers  Ophthalmologist is aware and following

## 2022-01-11 NOTE — ASSESSMENT & PLAN NOTE
Chronic and stable conditon   Noted to be worse during allergy season  SOB for the last year  Takes albuterol as needed  Has notcied an increase her feeling winded recently   Denies wheezing with SOB

## 2022-01-11 NOTE — ASSESSMENT & PLAN NOTE
Chronic and stable condition   Follows with Caryn Saucedo galanopolus   Diagnosed in 2013, Whippple compelted in 2014  Recent metatstasis to liver for last year

## 2022-01-11 NOTE — ASSESSMENT & PLAN NOTE
Chronic and stable condition   Is not on any current medications  Currently uses frankincense oil which seems to control night time isses

## 2022-01-11 NOTE — ASSESSMENT & PLAN NOTE
Chronic and stable condition   currently takes zoloft daily 150mg  Tolerates medication well   Denies SI/HI

## 2022-01-11 NOTE — ASSESSMENT & PLAN NOTE
Chronic and stable condition   Diagnosed December 2013 1/27/2014 whipple procedure completed by Dr. Brito-  Sx  Dr. Caryn CA completed EGD yearly   Takes Crenon to help diagest food    Noted tumors in head of panaceas and was biopsied they wree benign and body absorbed them. Up to a year ago they ran more tests and noted Liver ca    1/25/2022- mapping for radiation beads with IR

## 2022-01-11 NOTE — ASSESSMENT & PLAN NOTE
Chronic and stable conditon   Pending mapping for radiation beads this month  follwos with leon pierce and KEVIN

## 2022-01-11 NOTE — ASSESSMENT & PLAN NOTE
Chronic and exacerbated condition   States she falls without notice  Noted increase with her dizziness, lightheadedness and neuropathy  States she now is slowing down and using a cane when in places she is not familiar with  States dizziness is daily and regular  States last fall this past fall august or september  States palpitations with her dizziness

## 2022-01-11 NOTE — ASSESSMENT & PLAN NOTE
Chronic and stable condition   Was diagnosed in 2016 with <50% stenosis bilaterally to internal carodti arteries  Currently takes atrvastatin 10 mg daily   States lightheaded and dizziness, falling and balance issues  States she was seen neurologist for this

## 2022-01-11 NOTE — PROGRESS NOTES
Chief Complaint   Patient presents with   • Establish Care   • Shortness of Breath     x1year      Subjective:     Denise Gaines is a 66 y.o. female presenting for to establish care and management of      Malignant neoplasm of head of pancreas (CMS-HCC)  Chronic and stable condition   Diagnosed December 2013 1/27/2014 whipple procedure completed by Dr. Brito- Gen Sx  Dr. Caryn CA completed EGD yearly   Takes Crenon to help diagest food    Noted tumors in head of panaceas and was biopsied they wree benign and body absorbed them. Up to a year ago they ran more tests and noted Liver ca    1/25/2022- mapping for radiation beads with IR    Acid reflux disease  Chronic and stable condition   Currently controlled with Nexium    Pancreatic cancer (CMS-HCC)  Follows by jennifer Rocha   Had whipple procedure completed    Hyperlipidemia  Chronic and stable condition   Controlled with atorvastatin   History of blood clots to right eye, DM    Asthma  Chronic and stable conditon   Noted to be worse during allergy season  SOB for the last year  Takes albuterol as needed  Has notcied an increase her feeling winded recently   Denies wheezing with SOB    Diabetes mellitus (HCC)  Chronic and stable condition   Insulin pump for last three years  Follows with endocrinologist Dr. Coleman  Follows up on 1/26/2022    History of pancreatic cancer  Chronic and stable condition   Follows with Dr. Coleman, leon Rubin   Diagnosed in 2013, Whippple compelted in 2014  Recent metatstasis to liver for last year      Diabetic neuropathy associated with type 1 diabetes mellitus (HCC)  Chronic and stable condition   Is not on any current medications  Currently uses frankincense oil which seems to control night time isses    Retinal disease, right  Chronic and stbale conditon   Follows with ophthalmology dr Isaac  Gets injections of some sort- believes it is a chemo drug to right eye only   Denies any new changes in vision  but has noted when she is getting tired she notices black dots in vision with white centers  Ophthalmologist is aware and following    Bilateral carotid artery stenosis  Chronic and stable condition   Was diagnosed in 2016 with <50% stenosis bilaterally to internal carodti arteries  Currently takes atrvastatin 10 mg daily   States lightheaded and dizziness, falling and balance issues  States she was seen neurologist for this    Major depressive disorder in remission (HCC)  Chronic and stable condition   currently takes zoloft daily 150mg  Tolerates medication well   Denies SI/HI       Recurrent falls  Chronic and exacerbated condition   States she falls without notice  Noted increase with her dizziness, lightheadedness and neuropathy  States she now is slowing down and using a cane when in places she is not familiar with  States dizziness is daily and regular  States last fall this past fall august or september  States palpitations with her dizziness    Metastatic malignant neuroendocrine tumor to liver (HCC)  Chronic and stable conditon   Pending mapping for radiation beads this month  follwos with dr naranjo, nahomyanoqasim and IR      Allergies  flonase      Osteoporosis  Has tried biphosphnates  Takes glucosamine and vitamin d   Gets bone density annually        Review of Systems   Constitutional: Negative for chills, fever, malaise/fatigue and weight loss.   HENT: Negative.    Eyes: Positive for blurred vision.        Follows with Dr. Moore   Respiratory: Positive for shortness of breath. Negative for cough.    Cardiovascular: Positive for palpitations. Negative for chest pain and leg swelling.   Gastrointestinal: Positive for abdominal pain, diarrhea and nausea. Negative for blood in stool, constipation and vomiting.   Genitourinary: Negative for dysuria, frequency and hematuria.   Musculoskeletal: Positive for falls.   Skin: Negative.    Neurological: Positive for dizziness, tingling, tremors and weakness.  Negative for headaches.   Psychiatric/Behavioral: Negative for depression.            Current Outpatient Medications:   •  Glucosamine HCl (GLUCOSAMINE PO), Take  by mouth., Disp: , Rfl:   •  insulin lispro (HUMALOG) 100 UNIT/ML, Inject  under the skin continuous., Disp: , Rfl:   •  guaiFENesin ER (MUCINEX) 600 MG TABLET SR 12 HR, Take 600 mg by mouth every day., Disp: , Rfl:   •  ibuprofen (MOTRIN) 200 MG Tab, Take 600-800 mg by mouth every 8 hours as needed for Mild Pain. (Patient not taking: Reported on 2022), Disp: , Rfl:   •  Ketotifen Fumarate (ALLERGY EYE DROPS OP), Administer 1 Drop into both eyes as needed (Itchy eyes)., Disp: , Rfl:   •  insulin infusion pump Device, Inject  under the skin continuous. Patient's own SQ insulin pump with automatically adjusting sensor Tandum control pump Type of Insulin: Humalog 100 units/ml Last change of tubin2021 - Change tubing and site every 72 hours  Control 32iU  Dosin day average Basal rate:19 units/day Bolus: 18 units/day Correction ratio:5 units/day  Disconnect pump if patient becomes hypoglycemic and altered., Disp: , Rfl:   •  Multiple Minerals-Vitamins (CALCIUM-MAGNESIUM-ZINC-D3) Tab, Take 2 Tablets by mouth every day., Disp: , Rfl:   •  albuterol (VENTOLIN HFA) 108 (90 Base) MCG/ACT Aero Soln inhalation aerosol, Inhale 1-2 Puffs every 6 hours as needed for Shortness of Breath., Disp: , Rfl:   •  fluticasone (FLONASE) 50 MCG/ACT nasal spray, Spray 1 Spray in nose 2 times a day., Disp: , Rfl:   •  atorvastatin (LIPITOR) 10 MG Tab, Take 10 mg by mouth every evening., Disp: , Rfl:   •  esomeprazole (NEXIUM) 40 MG delayed-release capsule, Take 40 mg by mouth every evening., Disp: , Rfl:   •  B Complex Vitamins (B COMPLEX 1 PO), Take 1 Tablet by mouth every day., Disp: , Rfl:   •  Probiotic Product (PROBIOTIC ADVANCED PO), Take 1 Cap by mouth every evening., Disp: , Rfl:   •  Multiple Vitamins-Minerals (MULTIVITAMIN ADULT PO), Take 1 Tab by mouth  every day., Disp: , Rfl:   •  Pancrelipase, Lip-Prot-Amyl, (CREON) 31955 UNITS Cap DR Particles, Take 2 Tabs by mouth 3 times a day, with meals., Disp: , Rfl:   •  sertraline (ZOLOFT) 100 MG TABS, Take 150 mg by mouth every morning., Disp: , Rfl:     Past Medical History:   Diagnosis Date   • Abnormal MRI of abdomen 10/6/2021   • Adverse effect of anesthesia     Pt was very sick after surgery on 8/4/2021 under a local    • Anesthesia     PONV/ slow to emerge per pt   • Arthritis 4/7/17    Bilateral shoulders and both hands-osteo   • ASTHMA     allergy induced-uses inhalers prn/flonase   • Blood clotting disorder (HCC) dx 10/2016    Right eye retina-Treated with laser and injections   • Bowel habit changes     Diarrhea chronic/ fatty stools at times   • Bronchitis 12/2017   • Cancer ([facility]) 12/2013-dx    pancreatic CA. Whipple procedure 1/2014   • Carotid artery plaque 2016    per ultrasound   • Cataract 10/11/2021    Bilateral-early stages   • Colitis since age 14   • Delayed emergence from general anesthesia    • Diabetes ([facility]) 2014    post whipple procedure/ uses humalog, insuliln pump   • Heart burn     treated with nexium   • Heart murmur     since birth, slight   • High cholesterol    • Indigestion     takes creon digestive enzymes   • MRSA (methicillin resistant Staphylococcus aureus) 11/2016    Left hand-cleared    • MRSA (methicillin resistant Staphylococcus aureus) 1/30/2017    Back of left thigh-cleared.   • Neuropathy 07/2021    viky hands and feet   • Obstruction of bile duct 1/27/2014   • Osteoporosis    • Pneumonia 2012    gets a pneumovax   • PONV (postoperative nausea and vomiting)     Pt was very sick after surgery on 8/4/2021   • Psychiatric problem     depression/ anxiety    • Snoring     sinus issues   • Type II diabetes mellitus ([facility]) 4/5/2018   • Unspecified urinary incontinence     uses pad   • Urinary bladder disorder     incontinence       Past Surgical History:   Procedure Laterality Date   •  HEPATIC ABLATION LAPAROSCOPIC Bilateral 11/22/2021    Procedure: LESION, LIVER, DIAGNOSTIC LAPAROSCOPIC - LOBE intra operative ultrasound;  Surgeon: Shahbaz Jang M.D.;  Location: [facility];  Service: General   • PB INCISE FINGER TENDON SHEATH Right 8/4/2021    Procedure: RELEASE, TRIGGER FINGER - THUMB, A1 UNRULY.;  Surgeon: Drew Duong M.D.;  Location: [facility];  Service: Orthopedics   • PB SHLDR ARTHROSCOP,PART ACROMIOPLAS Right 1/10/2020    Procedure: DECOMPRESSION, SHOULDER, ARTHROSCOPIC - SUBACROMIAL W/LABRAL DEBRIDEMENT, SUPERIOR CAPSULAR RECONSTRUCTION, VALLADARES;  Surgeon: Mei Palacios M.D.;  Location: [facility];  Service: Orthopedics   • BICEPS TENODESIS Right 1/10/2020    Procedure: TENODESIS, BICEPS;  Surgeon: Mei Palacios M.D.;  Location: [facility];  Service: Orthopedics   • ROTATOR CUFF REPAIR Right 1/10/2020    Procedure: REPAIR, ROTATOR CUFF;  Surgeon: Mei Palacios M.D.;  Location: [facility];  Service: Orthopedics   • GASTROSCOPY  3/15/2018    Procedure: GASTROSCOPY - POSS BIOPSY, DILATION, POLYPECTOMY, CONTROL OF HEMORRHAGE;  Surgeon: Edward Rubin M.D.;  Location: [facility];  Service: EUS   • EGD W/ENDOSCOPIC ULTRASOUND  3/15/2018    Procedure: EGD W/ENDOSCOPIC ULTRASOUND;  Surgeon: Edward Rubin M.D.;  Location: [facility];  Service: EUS   • EGD WITH ASP/BX  3/15/2018    Procedure: EGD WITH ASP/BX;  Surgeon: Edward Rubin M.D.;  Location: [facility];  Service: EUS   • GASTROSCOPY N/A 4/13/2017    Procedure: GASTROSCOPY - W/POSS BIOPSY, DILATION, POLYPECTOMY, CONTROL OF HEMORRHAGE;  Surgeon: Edward Rubin M.D.;  Location: [facility];  Service:    • EGD W/ENDOSCOPIC ULTRASOUND N/A 4/13/2017    Procedure: EGD W/ENDOSCOPIC ULTRASOUND;  Surgeon: Edward Rubin M.D.;  Location: [facility];  Service:    • COLONOSCOPY  2016     "x 2   • ERCP-DIAGNOSTIC W/BIOPSY  8/2015    several to verify no return of pancreatic CA    • HERNIA REPAIR  7/2014    5 umbilical repaired at same time   • LAPAROSCOPY  1/27/2014    Performed by Shahbaz Jang M.D. at SURGERY [facility]   • WHIPPLE PROCEDURE  1/27/2014    Performed by Shahbaz Jang M.D. at SURGERY [facility]   • NODE DISSECTION  1/27/2014    Performed by Shahbaz Jang M.D. at SURGERY [facility]   • TONSILLECTOMY  2011   • OTHER ORTHOPEDIC SURGERY Left 2009    Nerve damage S/P lumbar surgery-foot broken and pinned.    • LUMBAR LAMINECTOMY DISKECTOMY  2008    L5   • SHOULDER ARTHROSCOPY W/ ROTATOR CUFF REPAIR Right 2006   • CARPAL TUNNEL RELEASE Right 2005   • BLADDER SUSPENSION  2003   • HYSTERECTOMY, TOTAL ABDOMINAL  1990       Family History   Problem Relation Age of Onset   • Lung Cancer Mother    • Dementia Father        Bee, Clindamycin, Penicillins, Sulfa drugs, Tape, Hydrocodone-acetaminophen, Oxycodone-acetaminophen, Codeine, Percocet [oxycodone-acetaminophen], and Vicodin [hydrocodone-acetaminophen]    Allergies, past medical history, past surgical history, family history, social history reviewed and updated    Objective:     Vitals: /78   Pulse 84   Temp 36.3 °C (97.3 °F) (Temporal)   Resp 16   Ht 1.549 m (5' 1\")   Wt 69.3 kg (152 lb 12.8 oz)   LMP  (LMP Unknown)   SpO2 95%   BMI 28.87 kg/m²   General: Alert, pleasant, NAD  EYES:   PERRL, EOMI, no icterus or pallor.  Conjunctivae and lids normal.   HENT:  Normocephalic.  External ears normal. Tympanic membranes pearly, opaque.  No nasal drainage present.  Oropharynx non-erythematous, mucous membranes moist.  Neck supple.   No thyromegaly or masses palpated. No cervical or supraclavicular lymphadenopathy.  Heart: Regular rate and rhythm.  S1 and S2 normal.  No murmurs appreciated.  Respiratory: Normal respiratory effort.  Clear to auscultation bilaterally.  Abdomen: Non-distended, " soft, non-tender, no guarding/rebound. Bowel sounds present.  No hepatosplenomegaly.  No hernias.  Skin: Warm, dry, no rashes.  Musculoskeletal: Gait is normal.  Moves all extremities well.    Extremities: No clubbing, cyanosis or edema noted.   Neurological: No tremors, sensation grossly intact, tone/strength normal, gait is normal, CN2-12 intact.  Psych:  Affect/mood is normal, judgement is good, memory is intact, grooming is appropriate.    Assessment/Plan:     1. Malignant neoplasm of head of pancreas (CMS-HCC)  2. Gastroesophageal reflux disease, unspecified whether esophagitis present  3. Malignant neoplasm of pancreas, unspecified location of malignancy (HCC)  4. History of pancreatic cancer  5. Metastatic malignant neuroendocrine tumor to liver (HCC)  Continue to follow with GI specialist, oncology/surgeon, endocrinologist  Continue with Creon    6. Other hyperlipidemia  Continue with Lipitor    7. Mild asthma without complication, unspecified whether persistent  Continue with albuterol as needed    8. Type 1 diabetes mellitus with other specified complication (HCC)  Continue with endocrinologist  Continue with insulin infusion device  Follows with Dr. Isaac for retinal screening  9. Other diabetic neurological complication associated with type 1 diabetes mellitus (HCC)  10. Retinal disease, right  Follows with Dr. Isaac for retinal screening    11. Bilateral carotid artery stenosis  Continue Lipitor    12. Major depressive disorder in remission, unspecified whether recurrent (HCC)  Continue with Zoloft    13. Recurrent falls  Discussed options for physical therapy    14. Osteoporosis, unspecified osteoporosis type, unspecified pathological fracture presence  Patient osteopenic and has a increased risk fracture in the lumbar spine and osteopenia with increased risk fracture in her left femur per DEXA scan completed 7/2021  Patient to continue the glucosamine, multivitamin    Discussed with patient possible  alternative diagnoses, patient is to take all medications as prescribed.     If symptoms persist FU w/PCP, if symptoms worsen go to emergency room.     If experiencing any side effects from prescribed medications reports to the office immediately or go to emergency room.    Reviewed indication, dosage, usage and potential adverse effects of prescribed medications.     Reviewed risks and benefits of treatment plan. Patient verbalizes understanding of all instruction and verbally agrees to plan.    Discussed plan with the patient, and she agrees to the above.      I personally reviewed prior external notes and test results pertinent to today's visit.        Return in about 4 weeks (around 2/8/2022).    My total time spent caring for the patient on the day of the encounter was 41 minutes.   This does not include time spent on separately billable procedures/tests.     Please note that this dictation was created using voice recognition software. I have made every reasonable attempt to correct obvious errors, but I expect that there may be errors of grammar and possibly content that I did not discover before finalizing the note.

## 2022-01-11 NOTE — ASSESSMENT & PLAN NOTE
Chronic and stable condition   Insulin pump for last three years  Follows with endocrinologist Dr. Coleman  Follows up on 1/26/2022

## 2022-01-11 NOTE — ASSESSMENT & PLAN NOTE
Chronic and stable condition   Controlled with atorvastatin   History of blood clots to right eye, DM

## 2022-01-12 ENCOUNTER — TELEMEDICINE (OUTPATIENT)
Dept: ADMISSIONS | Facility: MEDICAL CENTER | Age: 67
End: 2022-01-12
Attending: RADIOLOGY
Payer: MEDICARE

## 2022-01-24 NOTE — PROGRESS NOTES
Pt contacted APRN regarding a letter of denial for mapping/ y90 procedures planned 1/25 and 1/31. She received this letter via [employer] and the letter states she should have an ablation instead, however this was attempted and aborted in November 2021, and she was subsequently sent for Y90 intervention.     Will move her mapping and lung shunt appts to 1/31, and set a date for Y90 dose delivery to follow, pending appeal with her insurance. She is understandably disappointed with the delay but understands the rationale to wait for approval.

## 2022-01-25 ENCOUNTER — APPOINTMENT (OUTPATIENT)
Dept: RADIOLOGY | Facility: MEDICAL CENTER | Age: 67
End: 2022-01-25
Attending: RADIOLOGY
Payer: MEDICARE

## 2022-01-31 ENCOUNTER — APPOINTMENT (OUTPATIENT)
Dept: RADIOLOGY | Facility: MEDICAL CENTER | Age: 67
End: 2022-01-31
Attending: RADIOLOGY
Payer: MEDICARE

## 2022-01-31 ENCOUNTER — HOSPITAL ENCOUNTER (OUTPATIENT)
Dept: RADIATION ONCOLOGY | Facility: MEDICAL CENTER | Age: 67
End: 2022-01-31
Attending: RADIOLOGY
Payer: MEDICARE

## 2022-01-31 PROCEDURE — 77470 SPECIAL RADIATION TREATMENT: CPT | Mod: 26 | Performed by: RADIOLOGY

## 2022-01-31 PROCEDURE — 77470 SPECIAL RADIATION TREATMENT: CPT | Performed by: RADIOLOGY

## 2022-01-31 PROCEDURE — 77263 THER RADIOLOGY TX PLNG CPLX: CPT | Performed by: RADIOLOGY

## 2022-02-02 ENCOUNTER — APPOINTMENT (OUTPATIENT)
Dept: ADMISSIONS | Facility: MEDICAL CENTER | Age: 67
End: 2022-02-02
Payer: MEDICARE

## 2022-02-28 ENCOUNTER — PRE-ADMISSION TESTING (OUTPATIENT)
Dept: ADMISSIONS | Facility: MEDICAL CENTER | Age: 67
End: 2022-02-28
Attending: RADIOLOGY
Payer: MEDICARE

## 2022-03-04 ENCOUNTER — HOSPITAL ENCOUNTER (OUTPATIENT)
Dept: RADIOLOGY | Facility: MEDICAL CENTER | Age: 67
End: 2022-03-04
Attending: RADIOLOGY
Payer: MEDICARE

## 2022-03-04 ENCOUNTER — HOSPITAL ENCOUNTER (OUTPATIENT)
Facility: MEDICAL CENTER | Age: 67
End: 2022-03-04
Attending: RADIOLOGY | Admitting: RADIOLOGY
Payer: MEDICARE

## 2022-03-04 VITALS
HEART RATE: 80 BPM | RESPIRATION RATE: 16 BRPM | BODY MASS INDEX: 29.05 KG/M2 | OXYGEN SATURATION: 95 % | HEIGHT: 61 IN | DIASTOLIC BLOOD PRESSURE: 68 MMHG | WEIGHT: 153.88 LBS | TEMPERATURE: 97.5 F | SYSTOLIC BLOOD PRESSURE: 134 MMHG

## 2022-03-04 DIAGNOSIS — C7A.8 NEUROENDOCRINE CARCINOMA METASTATIC TO LIVER (HCC): ICD-10-CM

## 2022-03-04 DIAGNOSIS — C7B.8 NEUROENDOCRINE CARCINOMA METASTATIC TO LIVER (HCC): ICD-10-CM

## 2022-03-04 LAB
ALBUMIN SERPL BCP-MCNC: 3.5 G/DL (ref 3.2–4.9)
ALBUMIN/GLOB SERPL: 1.3 G/DL
ALP SERPL-CCNC: 418 U/L (ref 30–99)
ALT SERPL-CCNC: 81 U/L (ref 2–50)
ANION GAP SERPL CALC-SCNC: 9 MMOL/L (ref 7–16)
AST SERPL-CCNC: 92 U/L (ref 12–45)
BASOPHILS # BLD AUTO: 0.7 % (ref 0–1.8)
BASOPHILS # BLD: 0.03 K/UL (ref 0–0.12)
BILIRUB SERPL-MCNC: 0.4 MG/DL (ref 0.1–1.5)
BUN SERPL-MCNC: 11 MG/DL (ref 8–22)
CALCIUM SERPL-MCNC: 8.8 MG/DL (ref 8.5–10.5)
CHLORIDE SERPL-SCNC: 112 MMOL/L (ref 96–112)
CO2 SERPL-SCNC: 23 MMOL/L (ref 20–33)
CREAT SERPL-MCNC: 0.4 MG/DL (ref 0.5–1.4)
EOSINOPHIL # BLD AUTO: 0.18 K/UL (ref 0–0.51)
EOSINOPHIL NFR BLD: 4.1 % (ref 0–6.9)
ERYTHROCYTE [DISTWIDTH] IN BLOOD BY AUTOMATED COUNT: 47 FL (ref 35.9–50)
EXTERNAL QUALITY CONTROL: NORMAL
GLOBULIN SER CALC-MCNC: 2.8 G/DL (ref 1.9–3.5)
GLUCOSE BLD STRIP.AUTO-MCNC: 129 MG/DL (ref 65–99)
GLUCOSE SERPL-MCNC: 133 MG/DL (ref 65–99)
HCT VFR BLD AUTO: 40.1 % (ref 37–47)
HGB BLD-MCNC: 13.9 G/DL (ref 12–16)
IMM GRANULOCYTES # BLD AUTO: 0.02 K/UL (ref 0–0.11)
IMM GRANULOCYTES NFR BLD AUTO: 0.5 % (ref 0–0.9)
INR PPP: 0.97 (ref 0.87–1.13)
LYMPHOCYTES # BLD AUTO: 1.5 K/UL (ref 1–4.8)
LYMPHOCYTES NFR BLD: 33.9 % (ref 22–41)
MCH RBC QN AUTO: 30.9 PG (ref 27–33)
MCHC RBC AUTO-ENTMCNC: 34.7 G/DL (ref 33.6–35)
MCV RBC AUTO: 89.1 FL (ref 81.4–97.8)
MONOCYTES # BLD AUTO: 0.3 K/UL (ref 0–0.85)
MONOCYTES NFR BLD AUTO: 6.8 % (ref 0–13.4)
NEUTROPHILS # BLD AUTO: 2.39 K/UL (ref 2–7.15)
NEUTROPHILS NFR BLD: 54 % (ref 44–72)
NRBC # BLD AUTO: 0 K/UL
NRBC BLD-RTO: 0 /100 WBC
PLATELET # BLD AUTO: 158 K/UL (ref 164–446)
PMV BLD AUTO: 10.3 FL (ref 9–12.9)
POTASSIUM SERPL-SCNC: 3.8 MMOL/L (ref 3.6–5.5)
PROT SERPL-MCNC: 6.3 G/DL (ref 6–8.2)
PROTHROMBIN TIME: 12.6 SEC (ref 12–14.6)
RBC # BLD AUTO: 4.5 M/UL (ref 4.2–5.4)
SARS-COV+SARS-COV-2 AG RESP QL IA.RAPID: NEGATIVE
SODIUM SERPL-SCNC: 144 MMOL/L (ref 135–145)
WBC # BLD AUTO: 4.4 K/UL (ref 4.8–10.8)

## 2022-03-04 PROCEDURE — 700105 HCHG RX REV CODE 258: Performed by: RADIOLOGY

## 2022-03-04 PROCEDURE — 99153 MOD SED SAME PHYS/QHP EA: CPT

## 2022-03-04 PROCEDURE — 76937 US GUIDE VASCULAR ACCESS: CPT

## 2022-03-04 PROCEDURE — 160046 HCHG PACU - 1ST 60 MINS PHASE II

## 2022-03-04 PROCEDURE — 700117 HCHG RX CONTRAST REV CODE 255: Performed by: RADIOLOGY

## 2022-03-04 PROCEDURE — 80053 COMPREHEN METABOLIC PANEL: CPT

## 2022-03-04 PROCEDURE — 87426 SARSCOV CORONAVIRUS AG IA: CPT | Performed by: RADIOLOGY

## 2022-03-04 PROCEDURE — 75726 ARTERY X-RAYS ABDOMEN: CPT | Mod: XU

## 2022-03-04 PROCEDURE — 160002 HCHG RECOVERY MINUTES (STAT)

## 2022-03-04 PROCEDURE — 700111 HCHG RX REV CODE 636 W/ 250 OVERRIDE (IP): Performed by: RADIOLOGY

## 2022-03-04 PROCEDURE — 85610 PROTHROMBIN TIME: CPT

## 2022-03-04 PROCEDURE — 85025 COMPLETE CBC W/AUTO DIFF WBC: CPT

## 2022-03-04 PROCEDURE — 160036 HCHG PACU - EA ADDL 30 MINS PHASE I

## 2022-03-04 PROCEDURE — 82962 GLUCOSE BLOOD TEST: CPT

## 2022-03-04 PROCEDURE — A9540 TC99M MAA: HCPCS

## 2022-03-04 PROCEDURE — 79445 NUCLEAR RX INTRA-ARTERIAL: CPT

## 2022-03-04 PROCEDURE — 36245 INS CATH ABD/L-EXT ART 1ST: CPT

## 2022-03-04 PROCEDURE — 37243 VASC EMBOLIZE/OCCLUDE ORGAN: CPT

## 2022-03-04 PROCEDURE — 160035 HCHG PACU - 1ST 60 MINS PHASE I

## 2022-03-04 RX ORDER — MIDAZOLAM HYDROCHLORIDE 1 MG/ML
INJECTION INTRAMUSCULAR; INTRAVENOUS
Status: DISPENSED
Start: 2022-03-04 | End: 2022-03-04

## 2022-03-04 RX ORDER — SODIUM CHLORIDE 9 MG/ML
1000 INJECTION, SOLUTION INTRAVENOUS
Status: DISCONTINUED | OUTPATIENT
Start: 2022-03-04 | End: 2022-03-04 | Stop reason: HOSPADM

## 2022-03-04 RX ORDER — MIDAZOLAM HYDROCHLORIDE 1 MG/ML
.5-2 INJECTION INTRAMUSCULAR; INTRAVENOUS PRN
Status: ACTIVE | OUTPATIENT
Start: 2022-03-04 | End: 2022-03-04

## 2022-03-04 RX ORDER — SODIUM CHLORIDE 9 MG/ML
500 INJECTION, SOLUTION INTRAVENOUS
Status: DISPENSED | OUTPATIENT
Start: 2022-03-04 | End: 2022-03-04

## 2022-03-04 RX ORDER — ONDANSETRON 2 MG/ML
4 INJECTION INTRAMUSCULAR; INTRAVENOUS PRN
Status: ACTIVE | OUTPATIENT
Start: 2022-03-04 | End: 2022-03-04

## 2022-03-04 RX ORDER — ONDANSETRON 2 MG/ML
8 INJECTION INTRAMUSCULAR; INTRAVENOUS ONCE
Status: COMPLETED | OUTPATIENT
Start: 2022-03-04 | End: 2022-03-04

## 2022-03-04 RX ADMIN — IOHEXOL 60 ML: 300 INJECTION, SOLUTION INTRAVENOUS at 12:00

## 2022-03-04 RX ADMIN — MIDAZOLAM HYDROCHLORIDE 1 MG: 1 INJECTION, SOLUTION INTRAMUSCULAR; INTRAVENOUS at 10:53

## 2022-03-04 RX ADMIN — MIDAZOLAM HYDROCHLORIDE 1 MG: 1 INJECTION, SOLUTION INTRAMUSCULAR; INTRAVENOUS at 11:13

## 2022-03-04 RX ADMIN — ONDANSETRON 8 MG: 2 INJECTION INTRAMUSCULAR; INTRAVENOUS at 09:15

## 2022-03-04 RX ADMIN — FENTANYL CITRATE 50 MCG: 50 INJECTION INTRAMUSCULAR; INTRAVENOUS at 10:56

## 2022-03-04 ASSESSMENT — FIBROSIS 4 INDEX: FIB4 SCORE: 4.55

## 2022-03-04 NOTE — PROGRESS NOTES
History and Physical    Date: 3/4/2022    PCP: FERMIN Pack.      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     HPI: This is a 66 y.o. female who is presenting for pre Y90 visceral angiogram and MAA shunt study for treatment of right heptic lobe pancreatic neuroendocrine metastases.     Past Medical History:   Diagnosis Date   • Abnormal MRI of abdomen 10/6/2021   • Adverse effect of anesthesia     Pt was very sick after surgery on 8/4/2021 under a local    • Anesthesia     PONV/ slow to emerge per pt   • Arthritis 4/7/17    Bilateral shoulders and both hands-osteo   • ASTHMA     allergy induced-uses inhalers prn/flonase   • Blood clotting disorder (HCC) dx 10/2016    Right eye retina-Treated with laser and injections   • Bowel habit changes     Diarrhea chronic/ fatty stools at times   • Bronchitis 12/2017   • Cancer (HCC) 12/2013-dx    pancreatic CA. Whipple procedure 1/2014   • Carotid artery plaque 2016    per ultrasound   • Cataract 10/11/2021    Bilateral-early stages   • Colitis since age 14   • Delayed emergence from general anesthesia    • Diabetes (HCC) 2014    post whipple procedure/ uses humalog, insuliln pump   • Heart burn     treated with nexium   • Heart murmur     since birth, slight   • High cholesterol     preventitive for plack in the retina   • Indigestion     takes creon digestive enzymes   • MRSA (methicillin resistant Staphylococcus aureus) 11/2016    Left hand-cleared    • MRSA (methicillin resistant Staphylococcus aureus) 1/30/2017    Back of left thigh-cleared.   • Neuropathy 07/2021    viky hands and feet   • Obstruction of bile duct 1/27/2014   • Osteoporosis    • Pneumonia 2012    gets a pneumovax   • PONV (postoperative nausea and vomiting)     Pt was very sick after surgery on 8/4/2021   • Psychiatric problem     depression/ anxiety    • Snoring     sinus issues   • Type I diabetes mellitus (HCC) 4/5/2018   • Unspecified urinary incontinence      uses pad   • Urinary bladder disorder     incontinence       Past Surgical History:   Procedure Laterality Date   • HEPATIC ABLATION LAPAROSCOPIC Bilateral 11/22/2021    Procedure: LESION, LIVER, DIAGNOSTIC LAPAROSCOPIC - LOBE intra operative ultrasound;  Surgeon: Shahbaz Jang M.D.;  Location: [facility];  Service: General   • PB INCISE FINGER TENDON SHEATH Right 8/4/2021    Procedure: RELEASE, TRIGGER FINGER - THUMB, A1 UNRULY.;  Surgeon: Drew Duong M.D.;  Location: [facility];  Service: Orthopedics   • PB SHLDR ARTHROSCOP,PART ACROMIOPLAS Right 1/10/2020    Procedure: DECOMPRESSION, SHOULDER, ARTHROSCOPIC - SUBACROMIAL W/LABRAL DEBRIDEMENT, SUPERIOR CAPSULAR RECONSTRUCTION, VALLADARES;  Surgeon: Mei Palacios M.D.;  Location: [facility];  Service: Orthopedics   • BICEPS TENODESIS Right 1/10/2020    Procedure: TENODESIS, BICEPS;  Surgeon: Mei Palacios M.D.;  Location: [facility];  Service: Orthopedics   • ROTATOR CUFF REPAIR Right 1/10/2020    Procedure: REPAIR, ROTATOR CUFF;  Surgeon: Mei Palacios M.D.;  Location: [facility];  Service: Orthopedics   • EGD W/ENDOSCOPIC ULTRASOUND  3/15/2018    Procedure: EGD W/ENDOSCOPIC ULTRASOUND;  Surgeon: Edward Rubin M.D.;  Location: [facility];  Service: EUS   • EGD WITH ASP/BX  3/15/2018    Procedure: EGD WITH ASP/BX;  Surgeon: Edward Rubin M.D.;  Location: [facility];  Service: EUS   • GASTROSCOPY  3/15/2018    Procedure: GASTROSCOPY - POSS BIOPSY, DILATION, POLYPECTOMY, CONTROL OF HEMORRHAGE;  Surgeon: Edward Rubin M.D.;  Location: [facility];  Service: EUS   • EGD W/ENDOSCOPIC ULTRASOUND N/A 4/13/2017    Procedure: EGD W/ENDOSCOPIC ULTRASOUND;  Surgeon: Edward Rubin M.D.;  Location: [facility];  Service:    • GASTROSCOPY N/A 4/13/2017    Procedure: GASTROSCOPY - W/POSS BIOPSY, DILATION, POLYPECTOMY, CONTROL  OF HEMORRHAGE;  Surgeon: Edward Rubin M.D.;  Location: SURGERY [facility];  Service:    • COLONOSCOPY  2016    x 2   • ERCP-DIAGNOSTIC W/BIOPSY  2015    several to verify no return of pancreatic CA    • HERNIA REPAIR  2014    5 umbilical repaired at same time   • LAPAROSCOPY  2014    Performed by Shahbaz Jang M.D. at SURGERY [facility]   • NODE DISSECTION  2014    Performed by Shahbaz Jang M.D. at SURGERY [facility]   • WHIPPLE PROCEDURE  2014    Performed by Shahbaz Jang M.D. at SURGERY [facility]   • TONSILLECTOMY     • OTHER ORTHOPEDIC SURGERY Left 2009    Nerve damage S/P lumbar surgery-foot broken and pinned.    • LUMBAR LAMINECTOMY DISKECTOMY      L5   • SHOULDER ARTHROSCOPY W/ ROTATOR CUFF REPAIR Right    • CARPAL TUNNEL RELEASE Right    • BLADDER SUSPENSION     • HYSTERECTOMY, TOTAL ABDOMINAL         No current facility-administered medications for this encounter.     Current Outpatient Medications   Medication Sig Dispense Refill   • Glucosamine HCl (GLUCOSAMINE PO) Take  by mouth.     • insulin lispro (HUMALOG) 100 UNIT/ML Inject  under the skin continuous.     • guaiFENesin ER (MUCINEX) 600 MG TABLET SR 12 HR Take 600 mg by mouth every day.     • ibuprofen (MOTRIN) 200 MG Tab Take 600-800 mg by mouth every 8 hours as needed for Mild Pain. (Patient not taking: Reported on 2022)     • Ketotifen Fumarate (ALLERGY EYE DROPS OP) Administer 1 Drop into both eyes as needed (Itchy eyes).     • insulin infusion pump Device Inject  under the skin continuous. Patient's own SQ insulin pump with automatically adjusting sensor  Tandum control pump  Type of Insulin: Humalog 100 units/ml  Last change of tubin2021 - Change tubing and site every 72 hours   Control 32iU    Dosin day average  Basal rate:19 units/day  Bolus: 18 units/day  Correction ratio:5 units/day    Disconnect pump if patient becomes  hypoglycemic and altered.     • Multiple Minerals-Vitamins (CALCIUM-MAGNESIUM-ZINC-D3) Tab Take 2 Tablets by mouth every day.     • albuterol (VENTOLIN HFA) 108 (90 Base) MCG/ACT Aero Soln inhalation aerosol Inhale 1-2 Puffs every 6 hours as needed for Shortness of Breath.     • fluticasone (FLONASE) 50 MCG/ACT nasal spray Spray 1 Spray in nose 2 times a day.     • atorvastatin (LIPITOR) 10 MG Tab Take 10 mg by mouth every evening.     • esomeprazole (NEXIUM) 40 MG delayed-release capsule Take 40 mg by mouth every evening.     • B Complex Vitamins (B COMPLEX 1 PO) Take 1 Tablet by mouth every day.     • Probiotic Product (PROBIOTIC ADVANCED PO) Take 1 Cap by mouth every evening.     • Multiple Vitamins-Minerals (MULTIVITAMIN ADULT PO) Take 1 Tab by mouth every day.     • Pancrelipase, Lip-Prot-Amyl, (CREON) 21869 UNITS Cap DR Particles Take 2 Tabs by mouth 3 times a day, with meals.     • sertraline (ZOLOFT) 100 MG TABS Take 150 mg by mouth every morning.          Social History     Socioeconomic History   • Marital status: Single     Spouse name: Not on file   • Number of children: Not on file   • Years of education: Not on file   • Highest education level: Not on file   Occupational History   • Not on file   Tobacco Use   • Smoking status: Never Smoker   • Smokeless tobacco: Never Used   Vaping Use   • Vaping Use: Never used   Substance and Sexual Activity   • Alcohol use: Not Currently   • Drug use: Yes     Types: Marijuana     Comment: does use CBD oil topical/with THC occasional orally  - last used weeks ago since 11/22/21   • Sexual activity: Not on file   Other Topics Concern   • Not on file   Social History Narrative   • Not on file     Social Determinants of Health     Financial Resource Strain: Not on file   Food Insecurity: Not on file   Transportation Needs: Not on file   Physical Activity: Not on file   Stress: Not on file   Social Connections: Not on file   Intimate Partner Violence: Not on file    Housing Stability: Not on file       Family History   Problem Relation Age of Onset   • Lung Cancer Mother    • Dementia Father        Allergies:  Bee, Clindamycin, Penicillins, Sulfa drugs, Tape, Hydrocodone-acetaminophen, Oxycodone-acetaminophen, Codeine, Percocet [oxycodone-acetaminophen], and Vicodin [hydrocodone-acetaminophen]    Review of Systems:  Negative     Physical Exam    Vital Signs                           Labs:                    Radiology:  NM-QUANTITATIVE LUNG SCAN    (Results Pending)             Assessment and Plan: This is a 66 y.o. female who is presenting for pre Y90 visceral angiogram and MAA shunt study for treatment of right heptic lobe pancreatic neuroendocrine metastases.

## 2022-03-04 NOTE — DISCHARGE INSTRUCTIONS
ACTIVITY: Rest and take it easy for the first 24 hours.  A responsible adult is recommended to remain with you during that time.  It is normal to feel sleepy.  We encourage you to not do anything that requires balance, judgment or coordination.    MILD FLU-LIKE SYMPTOMS ARE NORMAL. YOU MAY EXPERIENCE GENERALIZED MUSCLE ACHES, THROAT IRRITATION, HEADACHE AND/OR SOME NAUSEA.    FOR 24 HOURS DO NOT:  Drive, operate machinery or run household appliances.  Drink beer or alcoholic beverages.   Make important decisions or sign legal documents.    SPECIAL INSTRUCTIONS:   Angiogram, Care After  This sheet gives you information about how to care for yourself after your procedure. Your doctor may also give you more specific instructions. If you have problems or questions, contact your doctor.  Follow these instructions at home:  Insertion site care  · Follow instructions from your doctor about how to take care of your long, thin tube (catheter) insertion area. Make sure you:  ? Wash your hands with soap and water before you change your bandage (dressing). If you cannot use soap and water, use hand .  ? Change your bandage as told by your doctor.  ? Leave stitches (sutures), skin glue, or skin tape (adhesive) strips in place. They may need to stay in place for 2 weeks or longer. If tape strips get loose and curl up, you may trim the loose edges. Do not remove tape strips completely unless your doctor says it is okay.  · Do not take baths, swim, or use a hot tub until your doctor says it is okay.  · You may shower 24-48 hours after the procedure or as told by your doctor.  ? Gently wash the area with plain soap and water.  ? Pat the area dry with a clean towel.  ? Do not rub the area. This may cause bleeding.  · Do not apply powder or lotion to the area. Keep the area clean and dry.  · Check your insertion area every day for signs of infection. Check for:  ? More redness, swelling, or pain.  ? Fluid or  blood.  ? Warmth.  ? Pus or a bad smell.  Activity  · Rest as told by your doctor, usually for 1-2 days.  · Do not lift anything that is heavier than 10 lbs. (4.5 kg) or as told by your doctor.  · Do not drive for 24 hours if you were given a medicine to help you relax (sedative).  · Do not drive or use heavy machinery while taking prescription pain medicine.  General instructions  · Go back to your normal activities as told by your doctor, usually in about a week. Ask your doctor what activities are safe for you.  · If the insertion area starts to bleed, lie flat and put pressure on the area. If the bleeding does not stop, get help right away. This is an emergency.  · Drink enough fluid to keep your pee (urine) clear or pale yellow.  · Take over-the-counter and prescription medicines only as told by your doctor.  · Keep all follow-up visits as told by your doctor. This is important.  Contact a doctor if:  · You have a fever.  · You have chills.  · You have more redness, swelling, or pain around your insertion area.  · You have fluid or blood coming from your insertion area.  · The insertion area feels warm to the touch.  · You have pus or a bad smell coming from your insertion area.  · You have more bruising around the insertion area.  · Blood collects in the tissue around the insertion area (hematoma) that may be painful to the touch.  Get help right away if:  · You have a lot of pain in the insertion area.  · The insertion area swells very fast.  · The insertion area is bleeding, and the bleeding does not stop after holding steady pressure on the area.  · The area near or just beyond the insertion area becomes pale, cool, tingly, or numb.  These symptoms may be an emergency. Do not wait to see if the symptoms will go away. Get medical help right away. Call your local emergency services (911 in the [place] Do not drive yourself to the hospital.  Summary  · After the procedure, it is common to have bruising and  tenderness at the long, thin tube insertion area.  · After the procedure, it is important to rest and drink plenty of fluids.  · Do not take baths, swim, or use a hot tub until your doctor says it is okay to do so. You may shower 24-48 hours after the procedure or as told by your doctor.  · If the insertion area starts to bleed, lie flat and put pressure on the area. If the bleeding does not stop, get help right away. This is an emergency.  This information is not intended to replace advice given to you by your health care provider. Make sure you discuss any questions you have with your health care provider.      DIET: To avoid nausea, slowly advance diet as tolerated, avoiding spicy or greasy foods for the first day.  Add more substantial food to your diet according to your physician's instructions.  Babies can be fed formula or breast milk as soon as they are hungry.  INCREASE FLUIDS AND FIBER TO AVOID CONSTIPATION.    SURGICAL DRESSING/BATHING: Ok to remove dressings and shower in 24 hours. Do not submerge site in water for one week.     FOLLOW-UP APPOINTMENT:  A follow-up appointment should be arranged with your doctor; call to schedule.    You should CALL YOUR PHYSICIAN if you develop:  Fever greater than 101 degrees F.  Pain not relieved by medication, or persistent nausea or vomiting.  Excessive bleeding (blood soaking through dressing) or unexpected drainage from the wound.  Extreme redness or swelling around the incision site, drainage of pus or foul smelling drainage.  Inability to urinate or empty your bladder within 8 hours.  Problems with breathing or chest pain.    You should call 911 if you develop problems with breathing or chest pain.  If you are unable to contact your doctor or surgical center, you should go to the nearest emergency room or urgent care center.    Physician's telephone #: [phone] Dr. Hardy    If any questions arise, call your doctor.  If your doctor is not available, please  feel free to call the Surgical Center at [phone].     A registered nurse may call you a few days after your surgery to see how you are doing after your procedure.    MEDICATIONS: Resume taking daily medication.  Take prescribed pain medication with food.  If no medication is prescribed, you may take non-aspirin pain medication if needed.  PAIN MEDICATION CAN BE VERY CONSTIPATING.  Take a stool softener or laxative such as senokot, pericolace, or milk of magnesia if needed.    If your physician has prescribed pain medication that includes Acetaminophen (Tylenol), do not take additional Acetaminophen (Tylenol) while taking the prescribed medication.    Depression / Suicide Risk    As you are discharged from this [facility] facility, it is important to learn how to keep safe from harming yourself.    Recognize the warning signs:  · Abrupt changes in personality, positive or negative- including increase in energy   · Giving away possessions  · Change in eating patterns- significant weight changes-  positive or negative  · Change in sleeping patterns- unable to sleep or sleeping all the time   · Unwillingness or inability to communicate  · Depression  · Unusual sadness, discouragement and loneliness  · Talk of wanting to die  · Neglect of personal appearance   · Rebelliousness- reckless behavior  · Withdrawal from people/activities they love  · Confusion- inability to concentrate     If you or a loved one observes any of these behaviors or has concerns about self-harm, here's what you can do:  · Talk about it- your feelings and reasons for harming yourself  · Remove any means that you might use to hurt yourself (examples: pills, rope, extension cords, firearm)  · Get professional help from the community (Mental Health, Substance Abuse, psychological counseling)  · Do not be alone:Call your Safe Contact- someone whom you trust who will be there for you.  · Call your local CRISIS HOTLINE 001-2802 or  575-790-4770  · Call your local Children's Mobile Crisis Response Team [place] (094) 597-5106 or www.[employer].mphoria  · Call the toll free National Suicide Prevention Hotlines   · National Suicide Prevention Lifeline 052-461-NSKW (6003)  · National [place] [employer] Network 800-SUICIDE (700-6661)

## 2022-03-04 NOTE — PROGRESS NOTES
Med rec complete per pt at bedside  Interviewed pt with family at bedside with permission from pt  Allergies reviewed and updated.    Pump per historical . Pt does not know settings.

## 2022-03-04 NOTE — OR NURSING
1216: Pt arrived from IR, handoff received from anesthesiologist and RN. Left femoral access with starclose in place, gauze and tegaderm dressing C/DI. Patent states numbness/tingling of bilateral lower extremitites at baseline, left pedal pulse 1+. Patient A&Ox4, verbalzing needs appropriately, educated on bed rest/lying flat for 2hrs (until 1330)-pt verbalized understanding. Patient has insulin pump in place, self monitoring. Patient reported to RN a glucose result of 102 at 1230, patient requested snack and water.    1230: Access site C/D/I, no presence of bleeding or hematoma.     1300: Patient's son updated on status. Patient resting comfortably. Groin site remains C/D/I. Patient states 3/10 lower back pain/discomfort d/t lying flat, declines need for intervention.     1335: Patient mobilized to EOB and standing, tolerated well. Groin site C/D/I.     1340: Report given to xavier Jones 2 [place].

## 2022-03-04 NOTE — PROGRESS NOTES
IR Nursing Note:    Patient underwent a  y90 mapping with posible embolization Dr. Lopez.  Procedure site was marked by MD and verified using imaging guidance.  Patient was placed in a supine position.  Right femoral artery was accessed.  Pre procedure pedal pulse +1.   Vitals were taken every 5 minutes and remained stable during procedure (see doc flow sheet for results).  CO2 waveform capnography was monitored and remained 28-35 throughout procedure.  Hemostasis was achieved using a star close and manual pressure for 5 minutes.  A Tegaderm and gauze dressing was placed over surgical site. Report called to Sujatha HOUGH. Pt transported by ghanshyam with RN to [facility] then to [facility].     [address] Ref # [id number] Lot # [id number]

## 2022-03-04 NOTE — OR SURGEON
Immediate Post- Operative Note        Findings: Metastatic pancreatic neuroendocrine cancer with liver mets       Procedure(s): Pre-Y90 visceral angiogram with MAA shunt study.      Estimated Blood Loss: Less than 5 ml        Complications: None            3/4/2022     11:27 AM     Diogo Hardy M.D.

## 2022-03-10 ENCOUNTER — TELEMEDICINE (OUTPATIENT)
Dept: MEDICAL GROUP | Facility: PHYSICIAN GROUP | Age: 67
End: 2022-03-10
Payer: MEDICARE

## 2022-03-10 VITALS — WEIGHT: 150 LBS | BODY MASS INDEX: 28.32 KG/M2 | HEIGHT: 61 IN

## 2022-03-10 DIAGNOSIS — C7B.8 METASTATIC MALIGNANT NEUROENDOCRINE TUMOR TO LIVER (HCC): ICD-10-CM

## 2022-03-10 DIAGNOSIS — R41.89 COGNITIVE CHANGE: ICD-10-CM

## 2022-03-10 DIAGNOSIS — E10.69 TYPE 1 DIABETES MELLITUS WITH OTHER SPECIFIED COMPLICATION (HCC): ICD-10-CM

## 2022-03-10 DIAGNOSIS — R79.89 OTHER SPECIFIED ABNORMAL FINDINGS OF BLOOD CHEMISTRY: ICD-10-CM

## 2022-03-10 PROCEDURE — 99213 OFFICE O/P EST LOW 20 MIN: CPT | Mod: 95 | Performed by: NURSE PRACTITIONER

## 2022-03-10 ASSESSMENT — ENCOUNTER SYMPTOMS
LOSS OF CONSCIOUSNESS: 0
DIZZINESS: 0
SEIZURES: 0
WEIGHT LOSS: 0
WEAKNESS: 1
FEVER: 0
CHILLS: 0
TREMORS: 1
HEADACHES: 0
MEMORY LOSS: 1
SPEECH CHANGE: 1
SHORTNESS OF BREATH: 0

## 2022-03-10 ASSESSMENT — FIBROSIS 4 INDEX: FIB4 SCORE: 4.27

## 2022-03-11 NOTE — PROGRESS NOTES
Virtual Visit: Established Patient   This visit was conducted via Zoom using secure and encrypted videoconferencing technology.   The patient was in their home in the [facility].    The patient's identity was confirmed and verbal consent was obtained for this virtual visit.     Subjective:   CC:   Chief Complaint   Patient presents with   • Other     Difficulty with cognitive thinking x3wks        Denise Gaines is a 66 y.o. female presenting for evaluation and management of:    Cognitive change  Onset 2 months ago   sporadic   Within the last 3 weeks she was with her sisters and was playing cards/dominos and seemed to begin to have issues with   Dexterity seems to be an issues as well  Difficulty with finding words either mispronounces or says wrong words  5 weeks ago was positive for COVID  States she had BP taken 1 week ago and was normal     Diabetes mellitus (HCC)  Follows with Dr. Charles  Recently glucose levels have been elevated from 80's to 300      ROS   Review of Systems   Constitutional: Negative for chills, fever, malaise/fatigue and weight loss.   HENT: Negative.    Respiratory: Negative for shortness of breath.    Cardiovascular: Negative for chest pain.   Skin: Negative.    Neurological: Positive for tremors, speech change and weakness. Negative for dizziness, seizures, loss of consciousness and headaches.   Psychiatric/Behavioral: Positive for memory loss.       Current medicines (including changes today)  Current Outpatient Medications   Medication Sig Dispense Refill   • guaiFENesin ER (MUCINEX) 600 MG TABLET SR 12 HR Take 600 mg by mouth every day.     • insulin infusion pump Device Inject  under the skin continuous. Patient's own SQ insulin pump with automatically adjusting sensor  Humalog Insulin  Tandum control pump  Type of Insulin: Humalog 100 units/ml  Last change of tubin2022 - Change tubing and site every 72 hours   Control 32iU    Dosin day average  Basal rate:19  "units/day  Bolus: 18 units/day  Correction ratio:5 units/day    Disconnect pump if patient becomes hypoglycemic and altered.     • Multiple Minerals-Vitamins (CALCIUM-MAGNESIUM-ZINC-D3) Tab Take 1 Tablet by mouth 2 times a day.     • fluticasone (FLONASE) 50 MCG/ACT nasal spray Spray 1 Spray in nose 2 times a day.     • atorvastatin (LIPITOR) 10 MG Tab Take 10 mg by mouth every evening.     • esomeprazole (NEXIUM) 40 MG delayed-release capsule Take 40 mg by mouth every evening.     • B Complex Vitamins (B COMPLEX 1 PO) Take 1 Tablet by mouth every day.     • Probiotic Product (PROBIOTIC ADVANCED PO) Take 1 Cap by mouth every evening.     • Multiple Vitamins-Minerals (MULTIVITAMIN ADULT PO) Take 1 Tab by mouth every day.     • Pancrelipase, Lip-Prot-Amyl, 47657-546336 units Cap DR Particles Take 2 Tabs by mouth 3 times a day, with meals.     • sertraline (ZOLOFT) 100 MG Tab Take 150 mg by mouth every morning.       No current facility-administered medications for this visit.       Patient Active Problem List    Diagnosis Date Noted   • Cognitive change 03/10/2022   • Allergies 01/11/2022   • Osteoporosis 01/11/2022   • Metastatic malignant neuroendocrine tumor to liver (HCC) 10/06/2021   • Neoplasm of uncertain behavior of liver 10/06/2021   • Radial styloid tenosynovitis 08/04/2021   • Hyperlipidemia 05/28/2021   • Asthma 05/28/2021   • History of pancreatic cancer 05/28/2021   • Diabetic neuropathy associated with type 1 diabetes mellitus (HCC) 05/28/2021   • Retinal disease, right 05/28/2021   • Bilateral carotid artery stenosis 05/28/2021   • Major depressive disorder in remission (HCC) 05/28/2021   • Recurrent falls 05/28/2021   • Diabetes mellitus (HCC) 09/03/2020   • Pancreatic cancer (HCC) 04/13/2017   • Malignant neoplasm of head of pancreas (CMS-HCC) 01/27/2014   • Acid reflux disease 01/27/2014        Objective:   Ht 1.549 m (5' 1\")   Wt 68 kg (150 lb)   LMP  (LMP Unknown)   BMI 28.34 kg/m²     Physical " Exam:  Constitutional: Alert, no distress, well-groomed.  Skin: No rashes in visible areas.  Eye: Round. Conjunctiva clear, lids normal. No icterus.   ENMT: Lips pink without lesions, good dentition, moist mucous membranes. Phonation normal.  Neck: No masses, no thyromegaly. Moves freely without pain.  Respiratory: Unlabored respiratory effort, no cough or audible wheeze  Psych: Alert and oriented x3, normal affect and mood.     Assessment and Plan:   The following treatment plan was discussed:     1. Cognitive change  - AMMONIA; Future  - URINALYSIS; Future  - VITAMIN B12; Future  - VITAMIN B1; Future  - Basic Metabolic Panel; Future  - MAGNESIUM; Future    2. Type 1 diabetes mellitus with other specified complication (HCC)    3. Other specified abnormal findings of blood chemistry   - AMMONIA; Future  - URINALYSIS; Future  - VITAMIN B12; Future  - VITAMIN B1; Future  - Basic Metabolic Panel; Future  - MAGNESIUM; Future    4. Metastatic malignant neuroendocrine tumor to liver (HCC)  - AMMONIA; Future  - URINALYSIS; Future  - VITAMIN B12; Future  - VITAMIN B1; Future  - Basic Metabolic Panel; Future  - MAGNESIUM; Future      Follow-up: Return for pending labs.

## 2022-03-11 NOTE — ASSESSMENT & PLAN NOTE
Onset 2 months ago   sporadic   Within the last 3 weeks she was with her sisters and was playing cards/dominos and seemed to begin to have issues with   Dexterity seems to be an issues as well  Difficulty with finding words either mispronounces or says wrong words  5 weeks ago was positive for COVID  States she had BP taken 1 week ago and was normal

## 2022-03-22 ENCOUNTER — TELEPHONE (OUTPATIENT)
Dept: MEDICAL GROUP | Facility: PHYSICIAN GROUP | Age: 67
End: 2022-03-22

## 2022-03-22 NOTE — TELEPHONE ENCOUNTER
Phone Number Called: [phone] (home)       Call outcome: Spoke to patient regarding message below.    Message: Spoke with patient in regards to her voicemail. Patient wanted to see if should could get results of her lab work. Patient got her labs done on 3/15/2022 at [facility]. I informed patient that I haven't received her results and that I will call over to them to get the results. I informed patient that I will pass on this message to Jaymie and if jaymie wanted her to schedule an appointment to go over these results that I would give her a call back. I called [facility] and placed the results on jaymie's ma desk.

## 2022-03-24 ENCOUNTER — TELEPHONE (OUTPATIENT)
Dept: MEDICAL GROUP | Facility: PHYSICIAN GROUP | Age: 67
End: 2022-03-24
Payer: MEDICARE

## 2022-03-24 NOTE — TELEPHONE ENCOUNTER
Patient called and would like a call back from the MA or Dr. Frausto for an update of her lab results thank you

## 2022-03-25 ENCOUNTER — PRE-ADMISSION TESTING (OUTPATIENT)
Dept: ADMISSIONS | Facility: MEDICAL CENTER | Age: 67
End: 2022-03-25
Attending: RADIOLOGY
Payer: MEDICARE

## 2022-03-25 VITALS — BODY MASS INDEX: 28.32 KG/M2 | HEIGHT: 61 IN | WEIGHT: 150 LBS

## 2022-03-25 ASSESSMENT — FIBROSIS 4 INDEX: FIB4 SCORE: 4.27

## 2022-03-25 NOTE — TELEPHONE ENCOUNTER
Called patient back regarding lab results. Results scanned into Chart and sent in message to patient

## 2022-03-29 ENCOUNTER — APPOINTMENT (OUTPATIENT)
Dept: RADIOLOGY | Facility: MEDICAL CENTER | Age: 67
End: 2022-03-29
Attending: RADIOLOGY
Payer: MEDICARE

## 2022-03-29 ENCOUNTER — HOSPITAL ENCOUNTER (OUTPATIENT)
Facility: MEDICAL CENTER | Age: 67
End: 2022-03-29
Attending: RADIOLOGY | Admitting: RADIOLOGY
Payer: MEDICARE

## 2022-03-29 VITALS
TEMPERATURE: 97.4 F | RESPIRATION RATE: 18 BRPM | SYSTOLIC BLOOD PRESSURE: 136 MMHG | DIASTOLIC BLOOD PRESSURE: 69 MMHG | BODY MASS INDEX: 28.84 KG/M2 | HEIGHT: 61 IN | HEART RATE: 76 BPM | WEIGHT: 152.78 LBS | OXYGEN SATURATION: 95 %

## 2022-03-29 DIAGNOSIS — C7A.8 NEUROENDOCRINE CARCINOMA METASTATIC TO LIVER (HCC): ICD-10-CM

## 2022-03-29 DIAGNOSIS — C7B.8 NEUROENDOCRINE CARCINOMA METASTATIC TO LIVER (HCC): ICD-10-CM

## 2022-03-29 LAB
ALBUMIN SERPL BCP-MCNC: 3.6 G/DL (ref 3.2–4.9)
ALBUMIN/GLOB SERPL: 1.2 G/DL
ALP SERPL-CCNC: 419 U/L (ref 30–99)
ALT SERPL-CCNC: 111 U/L (ref 2–50)
ANION GAP SERPL CALC-SCNC: 10 MMOL/L (ref 7–16)
AST SERPL-CCNC: 126 U/L (ref 12–45)
BILIRUB SERPL-MCNC: 0.5 MG/DL (ref 0.1–1.5)
BUN SERPL-MCNC: 11 MG/DL (ref 8–22)
CALCIUM SERPL-MCNC: 8.8 MG/DL (ref 8.5–10.5)
CHLORIDE SERPL-SCNC: 107 MMOL/L (ref 96–112)
CO2 SERPL-SCNC: 22 MMOL/L (ref 20–33)
CREAT SERPL-MCNC: 0.39 MG/DL (ref 0.5–1.4)
ERYTHROCYTE [DISTWIDTH] IN BLOOD BY AUTOMATED COUNT: 49.2 FL (ref 35.9–50)
EXTERNAL QUALITY CONTROL: NORMAL
GFR SERPLBLD CREATININE-BSD FMLA CKD-EPI: 110 ML/MIN/1.73 M 2
GLOBULIN SER CALC-MCNC: 2.9 G/DL (ref 1.9–3.5)
GLUCOSE SERPL-MCNC: 126 MG/DL (ref 65–99)
HCT VFR BLD AUTO: 43.2 % (ref 37–47)
HGB BLD-MCNC: 14.5 G/DL (ref 12–16)
INR PPP: 0.95 (ref 0.87–1.13)
MCH RBC QN AUTO: 30.8 PG (ref 27–33)
MCHC RBC AUTO-ENTMCNC: 33.6 G/DL (ref 33.6–35)
MCV RBC AUTO: 91.7 FL (ref 81.4–97.8)
PLATELET # BLD AUTO: 169 K/UL (ref 164–446)
PMV BLD AUTO: 10.2 FL (ref 9–12.9)
POTASSIUM SERPL-SCNC: 4 MMOL/L (ref 3.6–5.5)
PROT SERPL-MCNC: 6.5 G/DL (ref 6–8.2)
PROTHROMBIN TIME: 12.4 SEC (ref 12–14.6)
RBC # BLD AUTO: 4.71 M/UL (ref 4.2–5.4)
SARS-COV+SARS-COV-2 AG RESP QL IA.RAPID: NEGATIVE
SODIUM SERPL-SCNC: 139 MMOL/L (ref 135–145)
WBC # BLD AUTO: 4.8 K/UL (ref 4.8–10.8)

## 2022-03-29 PROCEDURE — 700111 HCHG RX REV CODE 636 W/ 250 OVERRIDE (IP): Performed by: RADIOLOGY

## 2022-03-29 PROCEDURE — 700111 HCHG RX REV CODE 636 W/ 250 OVERRIDE (IP)

## 2022-03-29 PROCEDURE — 160035 HCHG PACU - 1ST 60 MINS PHASE I

## 2022-03-29 PROCEDURE — 80053 COMPREHEN METABOLIC PANEL: CPT

## 2022-03-29 PROCEDURE — 160046 HCHG PACU - 1ST 60 MINS PHASE II

## 2022-03-29 PROCEDURE — 87426 SARSCOV CORONAVIRUS AG IA: CPT | Performed by: RADIOLOGY

## 2022-03-29 PROCEDURE — 36415 COLL VENOUS BLD VENIPUNCTURE: CPT

## 2022-03-29 PROCEDURE — 85610 PROTHROMBIN TIME: CPT

## 2022-03-29 PROCEDURE — 85027 COMPLETE CBC AUTOMATED: CPT

## 2022-03-29 PROCEDURE — 78803 RP LOCLZJ TUM SPECT 1 AREA: CPT

## 2022-03-29 PROCEDURE — 160036 HCHG PACU - EA ADDL 30 MINS PHASE I

## 2022-03-29 PROCEDURE — 700105 HCHG RX REV CODE 258: Performed by: RADIOLOGY

## 2022-03-29 PROCEDURE — 160002 HCHG RECOVERY MINUTES (STAT)

## 2022-03-29 PROCEDURE — 99153 MOD SED SAME PHYS/QHP EA: CPT

## 2022-03-29 RX ORDER — SODIUM CHLORIDE 9 MG/ML
INJECTION, SOLUTION INTRAVENOUS CONTINUOUS
Status: DISCONTINUED | OUTPATIENT
Start: 2022-03-29 | End: 2022-03-29 | Stop reason: HOSPADM

## 2022-03-29 RX ORDER — MIDAZOLAM HYDROCHLORIDE 1 MG/ML
INJECTION INTRAMUSCULAR; INTRAVENOUS
Status: COMPLETED
Start: 2022-03-29 | End: 2022-03-29

## 2022-03-29 RX ORDER — OCTREOTIDE ACETATE 100 UG/ML
300 INJECTION, SOLUTION INTRAVENOUS; SUBCUTANEOUS
Status: COMPLETED | OUTPATIENT
Start: 2022-03-29 | End: 2022-03-29

## 2022-03-29 RX ORDER — PROCHLORPERAZINE EDISYLATE 5 MG/ML
10 INJECTION INTRAMUSCULAR; INTRAVENOUS ONCE
Status: COMPLETED | OUTPATIENT
Start: 2022-03-29 | End: 2022-03-29

## 2022-03-29 RX ORDER — OXYCODONE HYDROCHLORIDE 5 MG/1
10 TABLET ORAL
Status: DISCONTINUED | OUTPATIENT
Start: 2022-03-29 | End: 2022-03-29 | Stop reason: HOSPADM

## 2022-03-29 RX ORDER — SODIUM CHLORIDE 9 MG/ML
500 INJECTION, SOLUTION INTRAVENOUS
Status: DISCONTINUED | OUTPATIENT
Start: 2022-03-29 | End: 2022-03-29 | Stop reason: HOSPADM

## 2022-03-29 RX ORDER — ONDANSETRON 2 MG/ML
4 INJECTION INTRAMUSCULAR; INTRAVENOUS PRN
Status: DISCONTINUED | OUTPATIENT
Start: 2022-03-29 | End: 2022-03-29 | Stop reason: HOSPADM

## 2022-03-29 RX ORDER — PROCHLORPERAZINE EDISYLATE 5 MG/ML
INJECTION INTRAMUSCULAR; INTRAVENOUS
Status: COMPLETED
Start: 2022-03-29 | End: 2022-03-29

## 2022-03-29 RX ORDER — MIDAZOLAM HYDROCHLORIDE 1 MG/ML
.5-2 INJECTION INTRAMUSCULAR; INTRAVENOUS PRN
Status: DISCONTINUED | OUTPATIENT
Start: 2022-03-29 | End: 2022-03-29 | Stop reason: HOSPADM

## 2022-03-29 RX ORDER — MORPHINE SULFATE 4 MG/ML
4 INJECTION INTRAVENOUS
Status: DISCONTINUED | OUTPATIENT
Start: 2022-03-29 | End: 2022-03-29 | Stop reason: HOSPADM

## 2022-03-29 RX ORDER — SODIUM CHLORIDE 9 MG/ML
1000 INJECTION, SOLUTION INTRAVENOUS CONTINUOUS
Status: DISCONTINUED | OUTPATIENT
Start: 2022-03-29 | End: 2022-03-29 | Stop reason: HOSPADM

## 2022-03-29 RX ORDER — METHYLPREDNISOLONE 4 MG/1
TABLET ORAL
Qty: 21 TABLET | Refills: 0 | Status: SHIPPED
Start: 2022-03-29 | End: 2022-07-05

## 2022-03-29 RX ORDER — ONDANSETRON 2 MG/ML
4 INJECTION INTRAMUSCULAR; INTRAVENOUS EVERY 8 HOURS PRN
Status: DISCONTINUED | OUTPATIENT
Start: 2022-03-29 | End: 2022-03-29 | Stop reason: HOSPADM

## 2022-03-29 RX ORDER — OXYCODONE HYDROCHLORIDE 5 MG/1
5 TABLET ORAL
Status: DISCONTINUED | OUTPATIENT
Start: 2022-03-29 | End: 2022-03-29 | Stop reason: HOSPADM

## 2022-03-29 RX ORDER — TRAMADOL HYDROCHLORIDE 50 MG/1
50 TABLET ORAL EVERY 4 HOURS PRN
Qty: 30 TABLET | Refills: 0 | Status: SHIPPED | OUTPATIENT
Start: 2022-03-29 | End: 2022-04-03

## 2022-03-29 RX ADMIN — CEFTRIAXONE SODIUM 2 G: 10 INJECTION, POWDER, FOR SOLUTION INTRAVENOUS at 09:20

## 2022-03-29 RX ADMIN — SODIUM CHLORIDE: 9 INJECTION, SOLUTION INTRAVENOUS at 09:20

## 2022-03-29 RX ADMIN — OCTREOTIDE ACETATE 300 MCG: 100 INJECTION, SOLUTION INTRAVENOUS; SUBCUTANEOUS at 09:54

## 2022-03-29 RX ADMIN — PROCHLORPERAZINE EDISYLATE 10 MG: 5 INJECTION INTRAMUSCULAR; INTRAVENOUS at 10:11

## 2022-03-29 RX ADMIN — SODIUM CHLORIDE 500 ML: 9 INJECTION, SOLUTION INTRAVENOUS at 10:50

## 2022-03-29 RX ADMIN — FENTANYL CITRATE 25 MCG: 50 INJECTION, SOLUTION INTRAMUSCULAR; INTRAVENOUS at 10:31

## 2022-03-29 RX ADMIN — MIDAZOLAM HYDROCHLORIDE 0.5 MG: 1 INJECTION, SOLUTION INTRAMUSCULAR; INTRAVENOUS at 10:31

## 2022-03-29 RX ADMIN — MIDAZOLAM HYDROCHLORIDE 0.5 MG: 1 INJECTION, SOLUTION INTRAMUSCULAR; INTRAVENOUS at 11:04

## 2022-03-29 RX ADMIN — ONDANSETRON 16 MG: 2 INJECTION INTRAMUSCULAR; INTRAVENOUS at 09:53

## 2022-03-29 ASSESSMENT — FIBROSIS 4 INDEX: FIB4 SCORE: 4.27

## 2022-03-29 NOTE — OR NURSING
1212: Pt arrived from OR, handoff received from anesthesiologist and RN. Dressing to right groin with starclose, gauze and tegaderm, C/D/I. Procedure site soft, no hematoma or bleeding present. Patient A&Ox4, moving all extremities, states bilateral upper and lower extremity baseline neuropathy. Strict flat-bed rest for two hours-initiated at 1110 per report. Patient with blood sugar monitor/insulin pump in place-reading on arrival to pacu: 58. Patient consumed two fruit juices and two gram cracker packs. Pt states she is asymptomatic, declines having blood sugar checked with hospital glucometer.     1220: Blood sugar monitor reading 64. Pt remains asymtomatic-monitoring glucose readings closely.     1230:Blood glucose monitor readin. Procedure site remains C/D/I.     1300: patient's son updated on status.     1315: two hours flat bed rest completed. Patient gt to EOB, stood, ambulated to restroom and back, site remained C/D/I. VSS. Report given to Harpal Jones in phase 2.

## 2022-03-29 NOTE — DISCHARGE INSTRUCTIONS
ACTIVITY: Rest and take it easy for the first 24 hours.  A responsible adult is recommended to remain with you during that time.  It is normal to feel sleepy.  We encourage you to not do anything that requires balance, judgment or coordination.    MILD FLU-LIKE SYMPTOMS ARE NORMAL. YOU MAY EXPERIENCE GENERALIZED MUSCLE ACHES, THROAT IRRITATION, HEADACHE AND/OR SOME NAUSEA.    FOR 24 HOURS DO NOT:  Drive, operate machinery or run household appliances.  Drink beer or alcoholic beverages.   Make important decisions or sign legal documents.        DIET: To avoid nausea, slowly advance diet as tolerated, avoiding spicy or greasy foods for the first day.  Add more substantial food to your diet according to your physician's instructions.  Babies can be fed formula or breast milk as soon as they are hungry.  INCREASE FLUIDS AND FIBER TO AVOID CONSTIPATION.    SURGICAL DRESSING/BATHING: Ok to remove dressing and shower in 24 hours. Do not submerge site for one week.    FOLLOW-UP APPOINTMENT:  A follow-up appointment should be arranged with your doctor; call to schedule.    You should CALL YOUR PHYSICIAN if you develop:  Fever greater than 101 degrees F.  Pain not relieved by medication, or persistent nausea or vomiting.  Excessive bleeding (blood soaking through dressing) or unexpected drainage from the wound.  Extreme redness or swelling around the incision site, drainage of pus or foul smelling drainage.  Inability to urinate or empty your bladder within 8 hours.  Problems with breathing or chest pain.    You should call 911 if you develop problems with breathing or chest pain.  If you are unable to contact your doctor or surgical center, you should go to the nearest emergency room or urgent care center.  Physician's telephone #: Dr. Hardy, [phone]    If any questions arise, call your doctor.  If your doctor is not available, please feel free to call the Surgical Center at [phone].     A registered nurse may  call you a few days after your surgery to see how you are doing after your procedure.    MEDICATIONS: Resume taking daily medication.  Take prescribed pain medication with food.  If no medication is prescribed, you may take non-aspirin pain medication if needed.  PAIN MEDICATION CAN BE VERY CONSTIPATING.  Take a stool softener or laxative such as senokot, pericolace, or milk of magnesia if needed.    Prescription given for tramadol, methylprednisone.    If your physician has prescribed pain medication that includes Acetaminophen (Tylenol), do not take additional Acetaminophen (Tylenol) while taking the prescribed medication.    Depression / Suicide Risk    As you are discharged from this [facility] facility, it is important to learn how to keep safe from harming yourself.    Recognize the warning signs:  · Abrupt changes in personality, positive or negative- including increase in energy   · Giving away possessions  · Change in eating patterns- significant weight changes-  positive or negative  · Change in sleeping patterns- unable to sleep or sleeping all the time   · Unwillingness or inability to communicate  · Depression  · Unusual sadness, discouragement and loneliness  · Talk of wanting to die  · Neglect of personal appearance   · Rebelliousness- reckless behavior  · Withdrawal from people/activities they love  · Confusion- inability to concentrate     If you or a loved one observes any of these behaviors or has concerns about self-harm, here's what you can do:  · Talk about it- your feelings and reasons for harming yourself  · Remove any means that you might use to hurt yourself (examples: pills, rope, extension cords, firearm)  · Get professional help from the community (Mental Health, Substance Abuse, psychological counseling)  · Do not be alone:Call your Safe Contact- someone whom you trust who will be there for you.  · Call your local CRISIS HOTLINE 161-6444 or 791-714-0602  · Call your local Children's  Mobile Crisis Response Team [place] (914) 737-1151 or www.[employer]  · Call the toll free National Suicide Prevention Hotlines   · National Suicide Prevention Lifeline 485-909-ONEP (4972)  · National Hope Line Network 800-SUICIDE (788-9411)

## 2022-03-29 NOTE — PROGRESS NOTES
Med rec complete per pt at bedside  Interviewed pt with family at bedside with permission from pt  Allergies reviewed and updated.    Pt is unsure of pump settings, but per pt has not changed. Left settings charted in med rec as historical except for date of last tubing change.

## 2022-03-29 NOTE — PROGRESS NOTES
IR NOTE: pt brought to nuc med from IR on monitor by IR RN. Pt tolerated scan well, VSS. Pt then brought to PACU. Pt 94% on RA on arrival, R groin drsg CDI, pedal pulses unchanged and marked. Pt to remain flat until 1310, 2 hrs post hemostasis w/ starclose device.

## 2022-03-29 NOTE — OR SURGEON
Immediate Post- Operative Note        Findings: Metastatic pancreatic neuroendocrine cancer.      Procedure(s): R lobe Y90.      Estimated Blood Loss: Less than 5 ml        Complications: None            3/29/2022     1110 AM     Diogo Hardy M.D.

## 2022-03-29 NOTE — PROGRESS NOTES
History and Physical    Date: 3/29/2022    PCP: NICOL Pack      CC: Metastatic pancreatic neuroendocrine cancer    HPI: This is a 66 y.o. female who is presenting for SIRT to the R liver lobe metastases.     Past Medical History:   Diagnosis Date   • Abnormal MRI of abdomen 10/6/2021   • Adverse effect of anesthesia     Pt was very sick after surgery on 8/4/2021 under a local    • Anesthesia     PONV/ slow to emerge per pt/ pt with hx of motion sickness   • Arthritis 4/7/17    Bilateral shoulders and both hands-osteo   • ASTHMA     allergy induced-uses inhalers prn/flonase   • Blood clotting disorder (HCC) dx 10/2016    Right eye retina-Treated with laser and injections   • Bowel habit changes 03/25/2022    Diarrhea chronic/ fatty stools at times   • Bronchitis 12/2017   • Cancer (HCC) 12/2013-dx    pancreatic CA. Whipple procedure 1/2014   • Cancer (HCC) 03/25/2022    liver cancer   • Carotid artery plaque 2016    per ultrasound   • Cataract 10/11/2021    Bilateral-early stages   • Colitis since age 14   • Delayed emergence from general anesthesia    • Diabetes (HCC) 2014    post whipple procedure/ uses humalog, insuliln pump   • Heart burn     treated with nexium   • Heart murmur     since birth, slight   • High cholesterol     preventitive for plack in the retina   • Indigestion     takes creon digestive enzymes   • MRSA (methicillin resistant Staphylococcus aureus) 11/2016    Left hand-cleared    • MRSA (methicillin resistant Staphylococcus aureus) 1/30/2017    Back of left thigh-cleared.   • Neuropathy 07/2021    viky hands and feet   • Obstruction of bile duct 1/27/2014   • Osteoporosis    • Pneumonia 2012    gets a pneumovax   • PONV (postoperative nausea and vomiting)     Pt was very sick after surgery on 8/4/2021. pt with hx motion sickness   • Psychiatric problem     depression/ anxiety    • Snoring     sinus issues   • Type I diabetes mellitus (HCC) 4/5/2018   • Unspecified urinary  incontinence     uses pad   • Urinary bladder disorder     incontinence       Past Surgical History:   Procedure Laterality Date   • HEPATIC ABLATION LAPAROSCOPIC Bilateral 11/22/2021    Procedure: LESION, LIVER, DIAGNOSTIC LAPAROSCOPIC - LOBE intra operative ultrasound;  Surgeon: Shahbaz Jang M.D.;  Location: [facility];  Service: General   • PB INCISE FINGER TENDON SHEATH Right 8/4/2021    Procedure: RELEASE, TRIGGER FINGER - THUMB, A1 UNRULY.;  Surgeon: Drew Duong M.D.;  Location: [facility];  Service: Orthopedics   • AZ SHLDR ARTHROSCOP,PART ACROMIOPLAS Right 1/10/2020    Procedure: DECOMPRESSION, SHOULDER, ARTHROSCOPIC - SUBACROMIAL W/LABRAL DEBRIDEMENT, SUPERIOR CAPSULAR RECONSTRUCTION, VALLADARES;  Surgeon: Mei Palacios M.D.;  Location: [facility];  Service: Orthopedics   • BICEPS TENODESIS Right 1/10/2020    Procedure: TENODESIS, BICEPS;  Surgeon: Mei Palacios M.D.;  Location: [facility];  Service: Orthopedics   • ROTATOR CUFF REPAIR Right 1/10/2020    Procedure: REPAIR, ROTATOR CUFF;  Surgeon: Mei Palacios M.D.;  Location: [facility];  Service: Orthopedics   • EGD W/ENDOSCOPIC ULTRASOUND  3/15/2018    Procedure: EGD W/ENDOSCOPIC ULTRASOUND;  Surgeon: Edward Rubin M.D.;  Location: [facility];  Service: EUS   • EGD WITH ASP/BX  3/15/2018    Procedure: EGD WITH ASP/BX;  Surgeon: Edward Rubin M.D.;  Location: [facility];  Service: EUS   • GASTROSCOPY  3/15/2018    Procedure: GASTROSCOPY - POSS BIOPSY, DILATION, POLYPECTOMY, CONTROL OF HEMORRHAGE;  Surgeon: Edward Rubin M.D.;  Location: [facility];  Service: EUS   • EGD W/ENDOSCOPIC ULTRASOUND N/A 4/13/2017    Procedure: EGD W/ENDOSCOPIC ULTRASOUND;  Surgeon: Edward Rubin M.D.;  Location: [facility];  Service:    • GASTROSCOPY N/A 4/13/2017    Procedure: GASTROSCOPY - W/POSS BIOPSY, DILATION,  POLYPECTOMY, CONTROL OF HEMORRHAGE;  Surgeon: Edward Rubin M.D.;  Location: SURGERY [facility];  Service:    • COLONOSCOPY  2016    x 2   • ERCP-DIAGNOSTIC W/BIOPSY  8/2015    several to verify no return of pancreatic CA    • HERNIA REPAIR  7/2014    5 umbilical repaired at same time   • LAPAROSCOPY  1/27/2014    Performed by Shahbaz Jang M.D. at SURGERY [facility]   • NODE DISSECTION  1/27/2014    Performed by Shahbaz Jang M.D. at SURGERY [facility]   • WHIPPLE PROCEDURE  1/27/2014    Performed by Shahbaz Jang M.D. at SURGERY [facility]   • TONSILLECTOMY  2011   • OTHER ORTHOPEDIC SURGERY Left 2009    Nerve damage S/P lumbar surgery-foot broken and pinned.    • LUMBAR LAMINECTOMY DISKECTOMY  2008    L5   • SHOULDER ARTHROSCOPY W/ ROTATOR CUFF REPAIR Right 2006   • CARPAL TUNNEL RELEASE Right 2005   • BLADDER SUSPENSION  2003   • HYSTERECTOMY, TOTAL ABDOMINAL  1990       Current Facility-Administered Medications   Medication Dose Route Frequency Provider Last Rate Last Admin   • octreotide (SANDOSTATIN) injection 300 mcg  300 mcg Subcutaneous Pre-Op Once Diogo Hardy M.D.       • ondansetron (ZOFRAN) in 50 mL NS IVPB 16 mg  16 mg Intravenous Once Diogo Hardy M.D.       • NS infusion   Intravenous Continuous Diogo Hardy M.D. 125 mL/hr at 03/29/22 0920 New Bag at 03/29/22 0920   • NS infusion 1,000 mL  1,000 mL Intravenous Continuous Diogo Hardy M.D.            Social History     Socioeconomic History   • Marital status: Single     Spouse name: Not on file   • Number of children: Not on file   • Years of education: Not on file   • Highest education level: Not on file   Occupational History   • Not on file   Tobacco Use   • Smoking status: Never Smoker   • Smokeless tobacco: Never Used   Vaping Use   • Vaping Use: Never used   Substance and Sexual Activity   • Alcohol use: Yes     Alcohol/week: 0.6 oz     Types: 1 Cans of beer per week     Comment:  occasionally   • Drug use: Yes     Types: Marijuana     Comment: does use CBD oil topical/with THC occasional orally  - last used weeks ago since 11/22/21   • Sexual activity: Not on file   Other Topics Concern   • Not on file   Social History Narrative   • Not on file     Social Determinants of Health     Financial Resource Strain: Not on file   Food Insecurity: Not on file   Transportation Needs: Not on file   Physical Activity: Not on file   Stress: Not on file   Social Connections: Not on file   Intimate Partner Violence: Not on file   Housing Stability: Not on file       Family History   Problem Relation Age of Onset   • Lung Cancer Mother    • Dementia Father        Allergies:  Bee, Clindamycin, Penicillins, Sulfa drugs, Tape, Hydrocodone-acetaminophen, Oxycodone-acetaminophen, Codeine, Percocet [oxycodone-acetaminophen], and Vicodin [hydrocodone-acetaminophen]    Review of Systems:  Negative    Physical Exam    Vital Signs  Blood Pressure : 126/72   Temperature: 36.1 °C (97 °F)   Pulse: 80   Respiration: 16   Pulse Oximetry: 96 %        Labs:  Recent Labs     03/29/22  0916   WBC 4.8   RBC 4.71   HEMOGLOBIN 14.5   HEMATOCRIT 43.2   MCV 91.7   MCH 30.8   MCHC 33.6   RDW 49.2   PLATELETCT 169   MPV 10.2         Recent Labs     03/29/22  0916   INR 0.95     Recent Labs     03/29/22  0916   INR 0.95       Radiology:  NM-LIVER IMAGING SPECT SINGLE DAY    (Results Pending)   IR-EMBOLIZATION    (Results Pending)             Assessment and Plan: This is a 66 y.o. female who is presenting for SIRT to the R liver lobe metastases.

## 2022-03-29 NOTE — PROGRESS NOTES
IR NOTE: y-90 administration (1 dose) to R liver by Dr. Hardy under moderate sedation, via R femoral artery access. Closed w/ Starclose vascular closure device. Pt tolerated well, VSS, R groin drsg CDI.   Pt to nuc med for post-procedure scan, then to PACU. Post procedure recovery orders to be placed by Dr. Hardy and released by PACU RN.       Starclose SE Vascular Closure System. 6 fr  LOT [id number]  REF [id number]  EXP 05/01/2023

## 2022-03-30 ENCOUNTER — HOSPITAL ENCOUNTER (OUTPATIENT)
Dept: RADIATION ONCOLOGY | Facility: MEDICAL CENTER | Age: 67
End: 2022-03-31
Attending: RADIOLOGY
Payer: MEDICARE

## 2022-03-30 PROCEDURE — 77295 3-D RADIOTHERAPY PLAN: CPT | Mod: 26 | Performed by: RADIOLOGY

## 2022-03-30 PROCEDURE — 77295 3-D RADIOTHERAPY PLAN: CPT | Performed by: RADIOLOGY

## 2022-03-31 ENCOUNTER — TELEPHONE (OUTPATIENT)
Dept: RADIATION ONCOLOGY | Facility: MEDICAL CENTER | Age: 67
End: 2022-03-31
Payer: MEDICARE

## 2022-04-01 ENCOUNTER — HOSPITAL ENCOUNTER (OUTPATIENT)
Dept: RADIATION ONCOLOGY | Facility: MEDICAL CENTER | Age: 67
End: 2022-04-30
Attending: RADIOLOGY
Payer: MEDICARE

## 2022-04-01 PROCEDURE — 77370 RADIATION PHYSICS CONSULT: CPT | Performed by: RADIOLOGY

## 2022-04-08 DIAGNOSIS — C7B.8 METASTATIC MALIGNANT NEUROENDOCRINE TUMOR TO LIVER (HCC): ICD-10-CM

## 2022-04-19 ENCOUNTER — HOSPITAL ENCOUNTER (OUTPATIENT)
Dept: RADIOLOGY | Facility: MEDICAL CENTER | Age: 67
End: 2022-04-19
Attending: NURSE PRACTITIONER
Payer: MEDICARE

## 2022-04-19 DIAGNOSIS — C7B.8 METASTATIC MALIGNANT NEUROENDOCRINE TUMOR TO LIVER (HCC): ICD-10-CM

## 2022-04-19 NOTE — PROGRESS NOTES
"  Telephone Appointment Visit    This telephone visit was initiated by the patient and they verbally consented. Diogo Hardy MD participated for 8 minutes of the call.    Reason for Call:  Lab Follow-up and Hospital Follow-up    HPI:    Denise Gaines was referred by Shahbaz Jang MD. She is a 66 y.o. 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 She is also under the care of Edward Rubin MD, Sylvia Coleman MD, Erin BEARD.      History of Present Illness:   presents with neuroendocrine tumor metastatic to the liver. She has a history of pancreatic cancer first diagnosed in 2013 and was more recently referred to the Interventional Oncology Service for local intervention with Y90 SIRT. Pertinent interventions include:  · 1/27/17  pancreaticoduodenectomy, Gwyn Jang   · 11/22/21 enterolysis, aborted liver ablation, Gwyn Jang  · 3/4/2022 mapping angiogram/ lung shunt study 4.4%, Gwyn Hardy  · 3/29/2022 Y90 right liver, 1.636 gB, Gwyn Hardy      is seen today for follow up after the IR procedures. Today, she reports some mild \"twinges\" of upper abdomen discomfort after the procedure. She denies complaints of nausea and pain. She reports some fatigue post procedure, and some back pain from being supine, but otherwise had no symptoms from the Y90 administration. States she never felt bad. She is hopeful this procedure will help her blood sugar normalize as the tumor treatment takes effect. She is not on any systemic cancer therapy. She intends to schedule a surveillance follow up with her surgical oncologist 3 months after the Y90 procedure.      Labs / Images Reviewed:          Ref. Range 3/4/2022 09:12 3/29/2022 09:16   WBC Latest Ref Range: 4.8 - 10.8 K/uL 4.4 (L) 4.8   RBC Latest Ref Range: 4.20 - 5.40 M/uL 4.50 4.71   Hemoglobin Latest Ref Range: 12.0 - 16.0 g/dL 13.9 14.5 "   Hematocrit Latest Ref Range: 37.0 - 47.0 % 40.1 43.2   MCV Latest Ref Range: 81.4 - 97.8 fL 89.1 91.7   MCH Latest Ref Range: 27.0 - 33.0 pg 30.9 30.8   MCHC Latest Ref Range: 33.6 - 35.0 g/dL 34.7 33.6   RDW Latest Ref Range: 35.9 - 50.0 fL 47.0 49.2   Platelet Count Latest Ref Range: 164 - 446 K/uL 158 (L) 169   MPV Latest Ref Range: 9.0 - 12.9 fL 10.3 10.2   Neutrophils-Polys Latest Ref Range: 44.00 - 72.00 % 54.00    Neutrophils (Absolute) Latest Ref Range: 2.00 - 7.15 K/uL 2.39    Lymphocytes Latest Ref Range: 22.00 - 41.00 % 33.90    Lymphs (Absolute) Latest Ref Range: 1.00 - 4.80 K/uL 1.50    Monocytes Latest Ref Range: 0.00 - 13.40 % 6.80    Monos (Absolute) Latest Ref Range: 0.00 - 0.85 K/uL 0.30    Eosinophils Latest Ref Range: 0.00 - 6.90 % 4.10    Eos (Absolute) Latest Ref Range: 0.00 - 0.51 K/uL 0.18    Basophils Latest Ref Range: 0.00 - 1.80 % 0.70    Baso (Absolute) Latest Ref Range: 0.00 - 0.12 K/uL 0.03    Immature Granulocytes Latest Ref Range: 0.00 - 0.90 % 0.50    Immature Granulocytes (abs) Latest Ref Range: 0.00 - 0.11 K/uL 0.02    Nucleated RBC Latest Units: /100 WBC 0.00    NRBC (Absolute) Latest Units: K/uL 0.00    Sodium Latest Ref Range: 135 - 145 mmol/L 144 139   Potassium Latest Ref Range: 3.6 - 5.5 mmol/L 3.8 4.0   Chloride Latest Ref Range: 96 - 112 mmol/L 112 107   Co2 Latest Ref Range: 20 - 33 mmol/L 23 22   Anion Gap Latest Ref Range: 7.0 - 16.0  9.0 10.0   Glucose Latest Ref Range: 65 - 99 mg/dL 133 (H) 126 (H)   Bun Latest Ref Range: 8 - 22 mg/dL 11 11   Creatinine Latest Ref Range: 0.50 - 1.40 mg/dL 0.40 (L) 0.39 (L)   GFR (CKD-EPI) Latest Ref Range: >60 mL/min/1.73 m 2  110   GFR If  Latest Ref Range: >60 mL/min/1.73 m 2 >60    GFR If Non  Latest Ref Range: >60 mL/min/1.73 m 2 >60    Calcium Latest Ref Range: 8.5 - 10.5 mg/dL 8.8 8.8   AST(SGOT) Latest Ref Range: 12 - 45 U/L 92 (H) 126 (H)   ALT(SGPT) Latest Ref Range: 2 - 50 U/L 81 (H) 111 (H)    Alkaline Phosphatase Latest Ref Range: 30 - 99 U/L 418 (H) 419 (H)   Total Bilirubin Latest Ref Range: 0.1 - 1.5 mg/dL 0.4 0.5   Albumin Latest Ref Range: 3.2 - 4.9 g/dL 3.5 3.6   Total Protein Latest Ref Range: 6.0 - 8.2 g/dL 6.3 6.5   Globulin Latest Ref Range: 1.9 - 3.5 g/dL 2.8 2.9   A-G Ratio Latest Units: g/dL 1.3 1.2   INR Latest Ref Range: 0.87 - 1.13  0.97 0.95   PT Latest Ref Range: 12.0 - 14.6 sec 12.6 12.4     Pathology:  10/13/21 at [facility]:  A. Liver mass biopsy cores:          Metastatic well-differentiated neuroendocrine tumor consistent           with the patient's known pancreatic primary.     Comment: If additional studies are requested, please utilize Block A1   which contains sufficient tumor cells for further testing.      3/15/18 at [facility]:  A. Pancreatic bed slides/remnants:          No evidence of malignancy.          The ThinPrep slide demonstrates predominantly histiocytes, small           lymphocytes and neutrophils.          The smears demonstrate background blood and similar areas of           small lymphocytes, histiocytes and neutrophils. A few clusters           of marked increased numbers of neutrophils are noted suggestive           for abscess tissue. Scattered cohesive groups of benign           columnar epithelium are noted consistent with gastrointestinal           contaminants.          No atypical or malignant cells are seen.   B. Pancreatic bed core:          No evidence of malignancy.          Several core biopsies of fibrous tissue demonstrating marked           acute and chronic inflammation. There are numerous core           biopsies of blood clot showing focal marked acute inflammation.          Several fragments of benign glandular epithelium representing           gastrointestinal contaminants are also noted.          No unequivocal atypical or malignant cells are seen.   C. Tail of pancreas mass slides/remnants:          No evidence of malignancy.          The  ThinPrep slide demonstrates predominantly small lymphocytes,           histiocytes and a few neutrophils.          The smears demonstrate background blood and focal marked           increased numbers of small mature lymphocytes consistent with           aspiration of a lymph node.          No atypical or malignant cells are seen.     D. Tail of pancreas mass core:          No evidence of malignancy.          Several core biopsies of fibrous and lymphoid tissue           demonstrating numerous small mature lymphocytes, suggestive for           biopsy of a lymph node.          There are also numerous bloody core biopsy fragments showing           similar small lymphocytes. No obvious epithelial cells are           seen.          Immunohistochemical stains demonstrate no significant numbers of           keratin positive cells. The CD3 and CD20 immunohistochemical           stained slides demonstrate a mixture of small T small B           lymphocytes consistent with reactive benign lymphoid tissue.           The CD20 immunohistochemical stain shows portions of apparent           reactive lymphoid follicles.          No atypical or malignant cells are seen.          See comment.     Comment: Pertinent H&E and immunohistochemical stained slides from the   pancreatic bed and pancreatic tail mass needle core biopsies are   reviewed with Dr. Martinez and Dr. Valenzuela, with agreement that no   malignancy or unequivocal recurrent well-differentiated neuroendocrine   tumor is present.     1/27/14 at [facility]:  A. Celiac nodes:          Three benign lymph nodes (0/3).   B. Gallbladder:          Benign gallbladder with mild chronic cholecystitis,           cholesterolosis, and cholelithiasis.   C. Common hepatic duct margin:          Cauterized bile duct wall with denudation/ulceration of the           surface epithelium, inflammation, and reactive fibrosis.          No malignancy identified.   D. Whipple:          Well-differentiated  neuroendocrine tumor measuring 1.9 cm in           maximal dimension (see comment).          Extension into the peripancreatic adipose tissue is identified.          Tumor focally involves the uncinate margin.          Tumor with associated fibrosis extends less than 1mm from the           intrapancreatic portion of the common bile duct.          Chronic pancreatitis with fibrosis is present at the parenchymal           margin.   E. Antrum:          Portion of benign gastric wall and duodenum.          One benign lymph node identified in the attached adipose tissue           (0/1).      Radiology:   Interventional radiology procedure 3/29/2022 at [facility] (Y90 RIGHT lobe), Diogo Hardy MD:  1. Successful radioembolization of right hepatic lobe.  2. Patient will return to interventional radiology clinic in 4-6 weeks for follow-up four-phase liver MRI.    Nuclear medicine Bremsstrahlung study 3/29/2022 at [facility]:  The prescribed dose was  1.606 GBq. The drawn dose was 1.666 GBq. Delivered dose after residual was measured at 1.636 gb. This represents  101.8% of the prescribed dose and  98.2% of the drawn dose. Bremsstrahlung images obtained after the Y-90 radioembolization, confirm proper delivery to the targeted region. There is no uptake outside the planned treatment area. NOTE: The patient will be followed by interventional radiology and oncology.    Interventional radiology procedure 3/4/2022 at [facility] (pre Y90 mapping):  1. Y90 mapping hepatic arteriogram.  2. Tumor vascularity arising from the right  hepatic artery distribution.  3. 4.8 mCi technetium 99 MAA for nuclear medicine lung shunt study.    Nuclear medicine lung shunt study 3/4/2022 at [facility]:  Total hepatopulmonary shunt percentage was 4.4%. No evidence of extrahepatic radiotracer deposition is identified.     IMPRESSION:  Quantitative lung scan as described.    MRI abdomen on 12/19/21 at [facility]:  1. Known lesion at the junction of hepatic segments 7 and 8  is stable to minimally smaller. Stable associated intrahepatic biliary dilatation.  2. A second 1.2 cm lesion in the hepatic segment 8, near the dome, highly concerning for a second metastasis. It is likely stable in retrospect.  3. No other liver lesions.  4. No pancreatic lesions detected.  5. Stable sequela of pancreaticoduodenectomy.      Image guided biopsy 10/13/21 at [facility] [place] [facility]""):  1.  CT guided 20 gauge core biopsy of the right lobe of the liver targeting a 28 mm lesion which was best seen on MRI.     PET CT Neuroendocrine 10/26/21 at [facility]:  1. Solitary hypermetabolic hepatic metastasis, corresponding to the lesion seen on the recent MRI.  2. No other hypermetabolic liver lesions.  3. Small hypermetabolic retroperitoneal elizabeth metastases, measuring up to 0.7 cm short axis.  4. No metastatic disease in the chest.  5. Sequela of pancreaticoduodenectomy, with a stent in the pancreatic body.      CT abdomen 10/16/21 at [facility]:  1.  No acute abnormality in the abdomen or pelvis.  2.  Colonic diverticulosis.  3.  Postsurgical changes within the abdomen, including those of pancreaticoduodenectomy.  4.  No liver lesions are appreciated on today's study.     Assessment and Plan:   Impression:   1. Unresectable metastatic neuroendocrine tumor to liver, status post Y90 SIRT.   2. Insulin dependent diabetes mellitus.  3. Hyperlipidemia.  4. Diarrhea.  5. Post procedure nausea and vomiting.   6. Exertional dyspnea.     Follow-up: patient arranged with surgical oncologist    Total Call Time Spent (mins): 8    SYED Munoz

## 2022-04-26 DIAGNOSIS — C7B.8 METASTATIC MALIGNANT NEUROENDOCRINE TUMOR TO LIVER (HCC): ICD-10-CM

## 2022-04-26 DIAGNOSIS — C78.7 METASTASIS TO LIVER (HCC): ICD-10-CM

## 2022-04-29 ENCOUNTER — TELEPHONE (OUTPATIENT)
Dept: MEDICAL GROUP | Facility: PHYSICIAN GROUP | Age: 67
End: 2022-04-29

## 2022-04-29 ENCOUNTER — OFFICE VISIT (OUTPATIENT)
Dept: MEDICAL GROUP | Facility: PHYSICIAN GROUP | Age: 67
End: 2022-04-29
Payer: MEDICARE

## 2022-04-29 VITALS
SYSTOLIC BLOOD PRESSURE: 122 MMHG | OXYGEN SATURATION: 94 % | DIASTOLIC BLOOD PRESSURE: 82 MMHG | WEIGHT: 153 LBS | BODY MASS INDEX: 28.89 KG/M2 | TEMPERATURE: 97.4 F | HEART RATE: 93 BPM | RESPIRATION RATE: 16 BRPM | HEIGHT: 61 IN

## 2022-04-29 DIAGNOSIS — R06.02 SOB (SHORTNESS OF BREATH): ICD-10-CM

## 2022-04-29 PROCEDURE — 99214 OFFICE O/P EST MOD 30 MIN: CPT | Performed by: NURSE PRACTITIONER

## 2022-04-29 ASSESSMENT — FIBROSIS 4 INDEX: FIB4 SCORE: 4.67

## 2022-04-29 NOTE — TELEPHONE ENCOUNTER
Pt left VM stating she feeling out of breath, upper abdominal pain/near chest area and is gassy. Pt is taking Nexium but not sure what she should do. Pt is requesting a call back.

## 2022-04-30 NOTE — ASSESSMENT & PLAN NOTE
History of SOB in the past  Since procedure for her liver radiation/pellets now has consistent SOB, cant even get up to make lunch without feeling SOB  Occasional CP but mostly mid-epigastric  Wells score for PE 2.5  Due to feeling of this is unlikely a pulmonary embolism we will still go ahead and do an EKG to look for right heart strain as long as this is normal we will send her home with ER precautions regarding pulmonary embolisms.  Patient does plan to follow-up with Dr. Bledsoe on Monday

## 2022-04-30 NOTE — PROGRESS NOTES
CC:  Chief Complaint   Patient presents with   • Shortness of Breath     When doing simple thing she is having sob   • Chest Pain     Off and on, first when she had radiation        HISTORY OF PRESENT ILLNESS: Patient is a 66 y.o. female established patient presenting shortness of breath, chest pain    1. SOB (shortness of breath)  Undiagnosed new condition uncertain prognosis.  Patient recently had procedure the beginning of April where they went in and placed some beads into her liver for liver cancer treatment.  She states that since this treatment began she is been much more fatigued and feeling that she is short of breath with simple tasks such as making lunch.  She has had intermittent chest pain with this but mostly pain is epigastric in nature.  She denies any unilateral swelling, being bedridden, tachycardia, hemoptysis, history of DVT or PE in the past         Allergies:Bee, Clindamycin, Penicillins, Sulfa drugs, Tape, Hydrocodone-acetaminophen, Oxycodone-acetaminophen, Codeine, Percocet [oxycodone-acetaminophen], and Vicodin [hydrocodone-acetaminophen]    Current Outpatient Medications   Medication Sig Dispense Refill   • methylPREDNISolone (MEDROL DOSEPAK) 4 MG Tablet Therapy Pack Follow schedule on package instructions. 21 Tablet 0   • guaiFENesin ER (MUCINEX) 600 MG TABLET SR 12 HR Take 600 mg by mouth every day.     • insulin infusion pump Device Inject  under the skin continuous. Patient's own SQ insulin pump with automatically adjusting sensor  Humalog Insulin  Tandum control pump  Type of Insulin: Humalog 100 units/ml  Last change of tubin2022 - Change tubing and site every 72 hours   Control 32iU    Dosin day average  Basal rate:19 units/day  Bolus: 18 units/day  Correction ratio:5 units/day    Disconnect pump if patient becomes hypoglycemic and altered.     • Multiple Minerals-Vitamins (CALCIUM-MAGNESIUM-ZINC-D3) Tab Take 1 Tablet by mouth 2 times a day.     • fluticasone (FLONASE)  "50 MCG/ACT nasal spray Spray 1 Spray in nose 2 times a day.     • atorvastatin (LIPITOR) 10 MG Tab Take 10 mg by mouth every evening.     • esomeprazole (NEXIUM) 40 MG delayed-release capsule Take 40 mg by mouth every evening.     • B Complex Vitamins (B COMPLEX 1 PO) Take 1 Tablet by mouth every day.     • Probiotic Product (PROBIOTIC ADVANCED PO) Take 1 Cap by mouth every evening.     • Multiple Vitamins-Minerals (MULTIVITAMIN ADULT PO) Take 1 Tab by mouth every day.     • Pancrelipase, Lip-Prot-Amyl, 45958-898466 units Cap DR Particles Take 2 Tabs by mouth 3 times a day, with meals.     • sertraline (ZOLOFT) 100 MG Tab Take 150 mg by mouth every morning.       No current facility-administered medications for this visit.       Social History     Tobacco Use   • Smoking status: Never Smoker   • Smokeless tobacco: Never Used   Vaping Use   • Vaping Use: Never used   Substance Use Topics   • Alcohol use: Yes     Alcohol/week: 0.6 oz     Types: 1 Cans of beer per week     Comment: occasionally   • Drug use: Yes     Types: Marijuana     Comment: does use CBD oil topical/with THC occasional orally  - last used weeks ago since 11/22/21     Social History     Social History Narrative   • Not on file       Family History   Problem Relation Age of Onset   • Lung Cancer Mother    • Dementia Father         ROS   See HPI    - NOTE: All other systems reviewed and are negative, except as in HPI.      Exam:    /82   Pulse 93   Temp 36.3 °C (97.4 °F) (Temporal)   Resp 16   Ht 1.549 m (5' 1\")   Wt 69.4 kg (153 lb)   SpO2 94%  Body mass index is 28.91 kg/m².    General: Alert, pleasant, NAD  EYES:   PERRL, EOMI, no icterus or pallor.  Conjunctivae and lids normal.   HENT:  Normocephalic.  External ears normal.   Heart: Regular rate and rhythm.    Respiratory: Normal respiratory effort.  Clear to auscultation bilaterally.  Abdomen: Non-distended, soft, tender to right upper quadrant, no guarding/rebound. Bowel sounds " present.  No hepatosplenomegaly.  No hernias.  Musculoskeletal: Gait is normal.  Moves all extremities well.    Extremities: No clubbing, cyanosis or edema noted.     Assessment/Plan:  SOB (shortness of breath)  History of SOB in the past  Since procedure for her liver radiation/pellets now has consistent SOB, cant even get up to make lunch without feeling SOB  Occasional CP but mostly mid-epigastric  Wells score for PE 2.5  Due to feeling of this is unlikely a pulmonary embolism we will still go ahead and do an EKG to look for right heart strain as long as this is normal we will send her home with ER precautions regarding pulmonary embolisms.  Patient does plan to follow-up with Dr. Bledsoe on Monday       EKG Interpretation-HR is 70 normal EKG, normal sinus rhythm, no change from previous EKGs.      1. SOB (shortness of breath)  EKG - Clinic Performed     Discussed with patient and son the precautions to return to the emergency room which all are agreeable.     Discussed with patient possible alternative diagnoses, patient is to take all medications as prescribed.     If symptoms persist FU w/PCP, if symptoms worsen go to emergency room.     If experiencing any side effects from prescribed medications reports to the office immediately or go to emergency room.    Reviewed indication, dosage, usage and potential adverse effects of prescribed medications.     Reviewed risks and benefits of treatment plan. Patient verbalizes understanding of all instruction and verbally agrees to plan.      Return for as needed.     My total time spent caring for the patient on the day of the encounter was 37 minutes.   This does not include time spent on separately billable procedures/tests.  Please note that this dictation was created using voice recognition software. I have made every reasonable attempt to correct obvious errors, but I expect that there are errors of grammar and possibly content that I did not discover  before finalizing the note.

## 2022-05-06 ENCOUNTER — HOSPITAL ENCOUNTER (OUTPATIENT)
Dept: RADIOLOGY | Facility: MEDICAL CENTER | Age: 67
End: 2022-05-06
Attending: SURGERY
Payer: MEDICARE

## 2022-05-06 DIAGNOSIS — C7B.8 METASTATIC MALIGNANT NEUROENDOCRINE TUMOR TO LIVER (HCC): ICD-10-CM

## 2022-05-06 DIAGNOSIS — C78.7 METASTASIS TO LIVER (HCC): ICD-10-CM

## 2022-05-06 PROCEDURE — 700117 HCHG RX CONTRAST REV CODE 255: Performed by: SURGERY

## 2022-05-06 PROCEDURE — 74160 CT ABDOMEN W/CONTRAST: CPT | Mod: MG

## 2022-05-06 RX ADMIN — IOHEXOL 100 ML: 350 INJECTION, SOLUTION INTRAVENOUS at 09:44

## 2022-05-11 NOTE — PROGRESS NOTES
Subjective:   5/17/2022  7:26 AM  Primary care physician: FERMIN Pack.  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 Abnormal MRI of abdomen  GLUCAGON PLASMA    GLUCAGON PLASMA    BUN+CREAT   3. Abdominal pain, unspecified abdominal location  GLUCAGON PLASMA    GLUCAGON PLASMA    BUN+CREAT   4. H/O Whipple procedure  GLUCAGON PLASMA    GLUCAGON PLASMA    BUN+CREAT   5. Liver disease  GLUCAGON PLASMA    GLUCAGON PLASMA    BUN+CREAT    CT-ABDOMEN LIVER FOR HEPATIC MASS (CIRRHOSIS)   6. Primary pancreatic neuroendocrine tumor     7. Neoplasm of uncertain behavior of liver         History of presenting illness:    Denise Gaines is a pleasant 65 year old female who presented with what appears to be a new liver lesion.  Patient is known to me having undergone a Whipple procedure back in 2014 for pancreatic neuroendocrine tumor.  At the time her pathology revealed a well-differentiated intermediate grade 2 cm pancreatic neuroendocrine tumor with 2 of 11 nodes positive.  She was a T3N1.  She did well postop but has not been seen since approximately late 2000's.  In any event, the patient had some discomfort and underwent imaging and was found to have a mass in the right lobe of the liver and around segment 5/6.  She was seen by Dr. Rubin who then proceeded with working her up and referring her in.  He also ordered liver biopsy unfortunately scheduled for November 1.  This is unacceptable.  The patient is here to see me.  She denies any fever or chills, nausea or vomiting.  She denies any flushing or diarrhea but she was recently diagnosed over the last 3 years with uncontrolled  diabetes.  It is so bad that she was placed on the insulin pump and that her endocrinologist is still having a lot of difficult controlling her sugars.  She has not had a glucagon but we will send off for 5 HIAA.  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  In any event, the patient has been in good spirits.  She has an ECOG performance status of 0.  She has had no real recent admissions to the hospital and she is not on any blood thinners.  She has no cardiac or lung disease.  Her kidney is functioning well.  She is here to discuss next steps.  I personally reviewed the patient's MRI her past medical history and spoke to her gastroenterologist about this case.  I also reviewed her old records and imaging studies dating back to 2014.    Update 5/17/22    Completed Y90 treatment of right lobe on 3/29/22 by Dr Hardy.    CT abdomen from 5/6/2022 notes hyperenhancing lesion at junction of segments 7 and 8, slightly smaller compared to prior MRI, and with associated intrahepatic biliary ductal dilatation. New area of hypoenhancement within the medial segment 6 of the liver measuring 1.6 x 1.2 cm without arterial enhancement. Hyperenhancing lesion in segment 8 near the hepatic dome unchanged compared to prior exam.    Labs on 3/29/22 significant for elevated , elevated , and elevated Alk Phos 419.    She is here today for evaluation status post yttrium-90 to the right lobe.  She is approximately 2 months since her treatment.  Patient states she has had some fatigue but is getting better and also she has had some discomfort which is improving.  The patient did undergo a liver CT on May 6, 2022 which appears to show stability.  Questionable shrinking of one of the areas of enhancement.  The region in the center of the liver appears to have minimal to no enhancement.  She has had some contraction of her right lobe secondary to the atrium.  She presently denies any  fever or chills, nausea or vomiting.  She does tell me that her insulin pump had to be turned down which would be a sign of tumor necrosis since this was a functional neuroendocrine tumor metastasis being a glucagonoma.  Patient denies being jaundiced.  She has a little discomfort in her right upper quadrant most likely secondary to liver capsular contraction.  She is here with her sister discuss neck steps.  She has seen her endocrinologist who is help managing the adjustment of her insulin pump which had to be turned down.  We do not have a repeat glucagon level.  I have also reviewed her old MRIs.      Past Medical History:   Diagnosis Date   • Abnormal MRI of abdomen 10/6/2021   • Adverse effect of anesthesia     Pt was very sick after surgery on 8/4/2021 under a local    • Anesthesia     PONV/ slow to emerge per pt/ pt with hx of motion sickness   • Arthritis 4/7/17    Bilateral shoulders and both hands-osteo   • ASTHMA     allergy induced-uses inhalers prn/flonase   • Blood clotting disorder (HCC) dx 10/2016    Right eye retina-Treated with laser and injections   • Bowel habit changes 03/25/2022    Diarrhea chronic/ fatty stools at times   • Bronchitis 12/2017   • Cancer (HCC) 12/2013-dx    pancreatic CA. Whipple procedure 1/2014   • Cancer (HCC) 03/25/2022    liver cancer   • Carotid artery plaque 2016    per ultrasound   • Cataract 10/11/2021    Bilateral-early stages   • Colitis since age 14   • Delayed emergence from general anesthesia    • Diabetes (HCC) 2014    post whipple procedure/ uses humalog, insuliln pump   • Heart burn     treated with nexium   • Heart murmur     since birth, slight   • High cholesterol     preventitive for plack in the retina   • Indigestion     takes creon digestive enzymes   • MRSA (methicillin resistant Staphylococcus aureus) 11/2016    Left hand-cleared    • MRSA (methicillin resistant Staphylococcus aureus) 1/30/2017    Back of left thigh-cleared.   • Neuropathy 07/2021     viky hands and feet   • Obstruction of bile duct 1/27/2014   • Osteoporosis    • Pneumonia 2012    gets a pneumovax   • PONV (postoperative nausea and vomiting)     Pt was very sick after surgery on 8/4/2021. pt with hx motion sickness   • Psychiatric problem     depression/ anxiety    • Snoring     sinus issues   • Type I diabetes mellitus (HCC) 4/5/2018   • Unspecified urinary incontinence     uses pad   • Urinary bladder disorder     incontinence     Past Surgical History:   Procedure Laterality Date   • HEPATIC ABLATION LAPAROSCOPIC Bilateral 11/22/2021    Procedure: LESION, LIVER, DIAGNOSTIC LAPAROSCOPIC - LOBE intra operative ultrasound;  Surgeon: Shahbaz Jang M.D.;  Location: [facility];  Service: General   • PB INCISE FINGER TENDON SHEATH Right 8/4/2021    Procedure: RELEASE, TRIGGER FINGER - THUMB, A1 UNRULY.;  Surgeon: Drew Duong M.D.;  Location: [facility];  Service: Orthopedics   • UT SHLDR ARTHROSCOP,PART ACROMIOPLAS Right 1/10/2020    Procedure: DECOMPRESSION, SHOULDER, ARTHROSCOPIC - SUBACROMIAL W/LABRAL DEBRIDEMENT, SUPERIOR CAPSULAR RECONSTRUCTION, VALLADARES;  Surgeon: Mei Palacios M.D.;  Location: [facility];  Service: Orthopedics   • BICEPS TENODESIS Right 1/10/2020    Procedure: TENODESIS, BICEPS;  Surgeon: Mei Palacios M.D.;  Location: [facility];  Service: Orthopedics   • ROTATOR CUFF REPAIR Right 1/10/2020    Procedure: REPAIR, ROTATOR CUFF;  Surgeon: Mei Palacios M.D.;  Location: [facility];  Service: Orthopedics   • EGD W/ENDOSCOPIC ULTRASOUND  3/15/2018    Procedure: EGD W/ENDOSCOPIC ULTRASOUND;  Surgeon: Edward Rubin M.D.;  Location: [facility];  Service: EUS   • EGD WITH ASP/BX  3/15/2018    Procedure: EGD WITH ASP/BX;  Surgeon: Edward Rubin M.D.;  Location: [facility];  Service: EUS   • GASTROSCOPY  3/15/2018    Procedure: GASTROSCOPY - POSS BIOPSY, DILATION,  POLYPECTOMY, CONTROL OF HEMORRHAGE;  Surgeon: Edward Rubin M.D.;  Location: [facility];  Service: EUS   • EGD W/ENDOSCOPIC ULTRASOUND N/A 4/13/2017    Procedure: EGD W/ENDOSCOPIC ULTRASOUND;  Surgeon: Edward Rubin M.D.;  Location: [facility];  Service:    • GASTROSCOPY N/A 4/13/2017    Procedure: GASTROSCOPY - W/POSS BIOPSY, DILATION, POLYPECTOMY, CONTROL OF HEMORRHAGE;  Surgeon: Edward Rubin M.D.;  Location: [facility];  Service:    • COLONOSCOPY  2016    x 2   • ERCP-DIAGNOSTIC W/BIOPSY  8/2015    several to verify no return of pancreatic CA    • HERNIA REPAIR  7/2014    5 umbilical repaired at same time   • LAPAROSCOPY  1/27/2014    Performed by Shahbaz Jang M.D. at SURGERY [facility]   • NODE DISSECTION  1/27/2014    Performed by Shahbaz Jang M.D. at SURGERY [facility]   • WHIPPLE PROCEDURE  1/27/2014    Performed by Shahbaz Jang M.D. at [facility]   • TONSILLECTOMY  2011   • OTHER ORTHOPEDIC SURGERY Left 2009    Nerve damage S/P lumbar surgery-foot broken and pinned.    • LUMBAR LAMINECTOMY DISKECTOMY  2008    L5   • SHOULDER ARTHROSCOPY W/ ROTATOR CUFF REPAIR Right 2006   • CARPAL TUNNEL RELEASE Right 2005   • BLADDER SUSPENSION  2003   • HYSTERECTOMY, TOTAL ABDOMINAL  1990     Allergies   Allergen Reactions   • Bee Anaphylaxis     Wasps   • Clindamycin Hives and Rash     Head to toe rash like chicken pox for 4 months.   • Penicillins Anaphylaxis   • Sulfa Drugs Hives and Itching     Rash hives itching   • Tape Rash     Rash and welt at site  PAPER TAPE OK   • Codeine Vomiting   • Percocet [Oxycodone-Acetaminophen] Vomiting     Outpatient Encounter Medications as of 5/17/2022   Medication Sig Dispense Refill   • EPINEPHrine (EPIPEN) 0.3 MG/0.3ML Solution Auto-injector solution for injection Inject 0.3 mL into the thigh one time for 1 dose. 1 Each 1   • methylPREDNISolone (MEDROL DOSEPAK) 4 MG Tablet  Therapy Pack Follow schedule on package instructions. 21 Tablet 0   • guaiFENesin ER (MUCINEX) 600 MG TABLET SR 12 HR Take 600 mg by mouth every day.     • insulin infusion pump Device Inject  under the skin continuous. Patient's own SQ insulin pump with automatically adjusting sensor  Humalog Insulin  Tandum control pump  Type of Insulin: Humalog 100 units/ml  Last change of tubin2022 - Change tubing and site every 72 hours   Control 32iU    Dosin day average  Basal rate:19 units/day  Bolus: 18 units/day  Correction ratio:5 units/day    Disconnect pump if patient becomes hypoglycemic and altered.     • Multiple Minerals-Vitamins (CALCIUM-MAGNESIUM-ZINC-D3) Tab Take 1 Tablet by mouth 2 times a day.     • fluticasone (FLONASE) 50 MCG/ACT nasal spray Spray 1 Spray in nose 2 times a day.     • atorvastatin (LIPITOR) 10 MG Tab Take 10 mg by mouth every evening.     • esomeprazole (NEXIUM) 40 MG delayed-release capsule Take 40 mg by mouth every evening.     • B Complex Vitamins (B COMPLEX 1 PO) Take 1 Tablet by mouth every day.     • Probiotic Product (PROBIOTIC ADVANCED PO) Take 1 Cap by mouth every evening.     • Multiple Vitamins-Minerals (MULTIVITAMIN ADULT PO) Take 1 Tab by mouth every day.     • Pancrelipase, Lip-Prot-Amyl, 47166-318087 units Cap DR Particles Take 2 Tabs by mouth 3 times a day, with meals.     • sertraline (ZOLOFT) 100 MG Tab Take 150 mg by mouth every morning.       No facility-administered encounter medications on file as of 2022.     Social History     Socioeconomic History   • Marital status: Single     Spouse name: Not on file   • Number of children: Not on file   • Years of education: Not on file   • Highest education level: Not on file   Occupational History   • Not on file   Tobacco Use   • Smoking status: Never Smoker   • Smokeless tobacco: Never Used   Vaping Use   • Vaping Use: Never used   Substance and Sexual Activity   • Alcohol use: Yes     Alcohol/week: 0.6 oz      "Types: 1 Cans of beer per week     Comment: occasionally   • Drug use: Yes     Types: Marijuana     Comment: does use CBD oil topical/with THC occasional orally  - last used weeks ago since 11/22/21   • Sexual activity: Not on file   Other Topics Concern   • Not on file   Social History Narrative   • Not on file     Social Determinants of Health     Financial Resource Strain: Not on file   Food Insecurity: Not on file   Transportation Needs: Not on file   Physical Activity: Not on file   Stress: Not on file   Social Connections: Not on file   Intimate Partner Violence: Not on file   Housing Stability: Not on file      Social History     Tobacco Use   Smoking Status Never Smoker   Smokeless Tobacco Never Used     Social History     Substance and Sexual Activity   Alcohol Use Yes   • Alcohol/week: 0.6 oz   • Types: 1 Cans of beer per week    Comment: occasionally     Social History     Substance and Sexual Activity   Drug Use Yes   • Types: Marijuana    Comment: does use CBD oil topical/with THC occasional orally  - last used weeks ago since 11/22/21      Family History   Problem Relation Age of Onset   • Lung Cancer Mother    • Dementia Father      Review of Systems   Constitutional: Positive for malaise/fatigue.   Gastrointestinal: Positive for abdominal pain.   All other systems reviewed and are negative.       Objective:   /62 (BP Location: Right arm, Patient Position: Sitting, BP Cuff Size: Adult)   Pulse 85   Temp 36.7 °C (98 °F) (Temporal)   Ht 1.537 m (5' 0.5\")   Wt 69.4 kg (153 lb)   LMP  (LMP Unknown)   SpO2 98%   BMI 29.39 kg/m²     Physical Exam  Vitals and nursing note reviewed.   Constitutional:       Appearance: Normal appearance.   HENT:      Head: Normocephalic and atraumatic.      Mouth/Throat:      Mouth: Mucous membranes are moist.      Pharynx: Oropharynx is clear.   Eyes:      Extraocular Movements: Extraocular movements intact.      Conjunctiva/sclera: Conjunctivae normal.      " Pupils: Pupils are equal, round, and reactive to light.   Cardiovascular:      Rate and Rhythm: Normal rate and regular rhythm.      Pulses: Normal pulses.      Heart sounds: Normal heart sounds.   Pulmonary:      Effort: Pulmonary effort is normal.      Breath sounds: Normal breath sounds.   Abdominal:      General: Abdomen is flat. Bowel sounds are normal.      Palpations: Abdomen is soft.      Tenderness: There is abdominal tenderness.      Comments: Mild subjective pain in the right upper quadrant   Musculoskeletal:      Cervical back: Normal range of motion and neck supple.   Skin:     General: Skin is warm and dry.   Neurological:      General: No focal deficit present.      Mental Status: She is alert. Mental status is at baseline.   Psychiatric:         Mood and Affect: Mood normal.         Behavior: Behavior normal.         Thought Content: Thought content normal.         Judgment: Judgment normal.         Labs   Latest Reference Range & Units 03/29/22 09:16   WBC 4.8 - 10.8 K/uL 4.8   RBC 4.20 - 5.40 M/uL 4.71   Hemoglobin 12.0 - 16.0 g/dL 14.5   Hematocrit 37.0 - 47.0 % 43.2   MCV 81.4 - 97.8 fL 91.7   MCH 27.0 - 33.0 pg 30.8   MCHC 33.6 - 35.0 g/dL 33.6   RDW 35.9 - 50.0 fL 49.2   Platelet Count 164 - 446 K/uL 169   MPV 9.0 - 12.9 fL 10.2         Latest Reference Range & Units 03/29/22 09:16   Sodium 135 - 145 mmol/L 139   Potassium 3.6 - 5.5 mmol/L 4.0   Chloride 96 - 112 mmol/L 107   Co2 20 - 33 mmol/L 22   Anion Gap 7.0 - 16.0  10.0   Glucose 65 - 99 mg/dL 126 (H)   Bun 8 - 22 mg/dL 11   Creatinine 0.50 - 1.40 mg/dL 0.39 (L)   GFR (CKD-EPI) >60 mL/min/1.73 m 2 110 [1]   Calcium 8.5 - 10.5 mg/dL 8.8   AST(SGOT) 12 - 45 U/L 126 (H)   ALT(SGPT) 2 - 50 U/L 111 (H)   Alkaline Phosphatase 30 - 99 U/L 419 (H)   Total Bilirubin 0.1 - 1.5 mg/dL 0.5   Albumin 3.2 - 4.9 g/dL 3.6   Total Protein 6.0 - 8.2 g/dL 6.5   Globulin 1.9 - 3.5 g/dL 2.9   A-G Ratio g/dL 1.2   (H): Data is abnormally high  (L): Data is  abnormally low  [1] Effective 3/15/2022, estimated Glomerular Filtration Rate   is calculated using race neutral CKD-EPI 2021 equation   per NKF-ASN recommendations.      Imaging:   CT ABDOMEN (5/6/2022)  IMPRESSION:  1.  Hyperenhancing lesion at the junction of segments 7 and 8 is slightly smaller compared to prior MRI. There is associated intrahepatic biliary ductal dilatation again noted.  2.  New area of hypoenhancement within the medial segment 6 of the liver measuring 1.6 x 1.2 cm without associated arterial enhancement. Attention on follow-up is recommended.  3.  Hyperenhancing lesion in segment 8 near the hepatic dome is unchanged compared to the prior examination.  4.  Status post pancreaticoduodenectomy. Pancreatic stent is seen. No pancreatic lesions are identified.  5.  Mildly enlarged retroperitoneal lymph nodes are unchanged.  6.  Sequelae of prior granulomatous exposure.    PET CT (10/26/21)  IMPRESSION:  1. Solitary hypermetabolic hepatic metastasis, corresponding to the lesion seen on the recent MRI.  2. No other hypermetabolic liver lesions.  3. Small hypermetabolic retroperitoneal elizabeth metastases, measuring up to 0.7 cm short axis.  4. No metastatic disease in the chest.  5. Sequela of pancreaticoduodenectomy, with a stent in the pancreatic body.    MRI (9/29/21)   IMPRESSION:  1.  Development of hypervascular mass within the right lobe the liver at the junction of hepatic segments 5 and 8 and having restricted diffusion  2.  This is most suspicious for a metastasis although could be intrahepatic cholangiocarcinoma.  3.  There is associated localized biliary ductal dilation peripheral to the mass  4.  Changes consistent with prior Whipple procedure with associated pneumobilia     Pathology:   Specimen (1/27/14)  FINAL DIAGNOSIS:   A. Celiac nodes:          Three benign lymph nodes (0/3).   B. Gallbladder:          Benign gallbladder with mild chronic cholecystitis,           cholesterolosis, and  cholelithiasis.   C. Common hepatic duct margin:          Cauterized bile duct wall with denudation/ulceration of the           surface epithelium, inflammation, and reactive fibrosis.          No malignancy identified.   D. Whipple:          Well-differentiated neuroendocrine tumor measuring 1.9 cm in           maximal dimension (see comment).          Extension into the peripancreatic adipose tissue is identified.          Tumor focally involves the uncinate margin.          Tumor with associated fibrosis extends less than 1mm from the           intrapancreatic portion of the common bile duct.          Chronic pancreatitis with fibrosis is present at the parenchymal           margin.   E. Antrum:          Portion of benign gastric wall and duodenum.          One benign lymph node identified in the attached adipose tissue           (0/1).     Synoptic Report for Pancreas (Neuroendocrine Tumor):          -Specimen: Head of pancreas, duodenum, common bile duct,         gallbladder, and portion of stomach.        -Procedure: Pancreaticoduodenectomy (Whipple resection), partial         pancreatectomy.        -Tumor Site: Pancreatic head.        -Tumor Size: 1.9 x 1.5 cm.        -Tumor Focality: Unifocal.        -Histologic Type and Grade: Well-differentiated neuroendocrine         tumor; G2: Intermediate grade (see comment)        -Mitotic Rate: <2 mitoses/10 high-power fields.         Specify mitoses per 10 HPF: 1.6 (8/50 HPFs counted).        -Microscopic Tumor Extension: Tumor invades peripancreatic soft         tissues.        -Margins: Uncinate process (retroperitoneal) margin focally         involved with tumor.         All remaining margins uninvolved by tumor.        -Lymph-Vascular Invasion: Not identified.        -Perineural Invasion: Present.        -Pathologic Staging:         Primary Tumor: pT3         Regional Lymph Nodes: pN1           Number of lymph nodes examined: 11           Number of lymph nodes  involved: 2        -Distant Metastasis: Not applicable.        -Ancillary Studies: Ki-67 labeling index 3%-20% range (8% 55 cells         counted).      Procedures:   Procedure (3/29/22)  Procedure(s): R lobe Y90.    Procedure (3/4/22)  Procedure(s): Pre-Y90 visceral angiogram with MAA shunt study.    Surgery (1/27/14)  1.  Exploratory laparoscopy/staging laparoscopy procedure.  2.  Open laparotomy procedure.  3.  Standard pancreaticoduodenectomy procedure.  4.  Celiac portal node dissection.  5.  Omental flap.  6.  Intraoperative ultrasound.  7.  Vein repair done by Dr. Doty.      Diagnosis:     1. 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 Abnormal MRI of abdomen  GLUCAGON PLASMA    GLUCAGON PLASMA    BUN+CREAT   3. Abdominal pain, unspecified abdominal location  GLUCAGON PLASMA    GLUCAGON PLASMA    BUN+CREAT   4. H/O Whipple procedure  GLUCAGON PLASMA    GLUCAGON PLASMA    BUN+CREAT   5. Liver disease  GLUCAGON PLASMA    GLUCAGON PLASMA    BUN+CREAT    CT-ABDOMEN LIVER FOR HEPATIC MASS (CIRRHOSIS)   6. Primary pancreatic neuroendocrine tumor     7. Neoplasm of uncertain behavior of liver           Medical Decision Making:  Today's Assessment / Status / Plan:     In light of the present findings, the patient's CT appears to be stable.  What is telling is that her sugars have been better controlled and that her insulin needs have dropped.  This would be consistent with her glucagonoma metastasis being treated.  We will reassess her in 3 months with repeat CT of the liver as well as glucagon levels.  I am sending her for stat glucagon level since her last 1 was measured in September 2021 and it was in the 700s.  She will see me in 3 months.    I, Dr. Jang have entered, reviewed and confirmed the above diagnoses related to this patient on this date of service, 5/17/2022  7:26 AM.    She agreed and verbalized her agreement and understanding  with the current plan. I answered all questions and concerns she has at this time and advised her to call at any time in the interim with questions or concerns in regards to her care.    Thank you for allowing me to participate in her care, I will continue to follow closely.       Please note that this dictation was created using voice recognition software. I have made every reasonable attempt to correct obvious errors, but I expect that there are errors of grammar and possibly content that I did not discover before finalizing the note.     Thank you for this consultation and allowing me to participate in your patient's care. If I can be of further service please contact my office.

## 2022-05-16 ENCOUNTER — TELEPHONE (OUTPATIENT)
Dept: MEDICAL GROUP | Facility: PHYSICIAN GROUP | Age: 67
End: 2022-05-16
Payer: MEDICARE

## 2022-05-16 DIAGNOSIS — Z91.030 ALLERGY TO BEE STING: ICD-10-CM

## 2022-05-16 RX ORDER — EPINEPHRINE 0.3 MG/.3ML
INJECTION SUBCUTANEOUS
Qty: 1 EACH | Refills: 1 | Status: SHIPPED | OUTPATIENT
Start: 2022-05-16

## 2022-05-16 NOTE — TELEPHONE ENCOUNTER
Pt left VM requesting a new RX for an Epipen. Pt was stung by a bee already and wants to be prepare for the summer time.

## 2022-05-17 ENCOUNTER — HOSPITAL ENCOUNTER (OUTPATIENT)
Dept: LAB | Facility: MEDICAL CENTER | Age: 67
End: 2022-05-17
Attending: SURGERY
Payer: MEDICARE

## 2022-05-17 ENCOUNTER — OFFICE VISIT (OUTPATIENT)
Dept: SURGICAL ONCOLOGY | Facility: MEDICAL CENTER | Age: 67
End: 2022-05-17
Payer: MEDICARE

## 2022-05-17 VITALS
SYSTOLIC BLOOD PRESSURE: 104 MMHG | OXYGEN SATURATION: 98 % | HEIGHT: 61 IN | BODY MASS INDEX: 28.89 KG/M2 | DIASTOLIC BLOOD PRESSURE: 62 MMHG | TEMPERATURE: 98 F | HEART RATE: 85 BPM | WEIGHT: 153 LBS

## 2022-05-17 DIAGNOSIS — D37.6 NEOPLASM OF UNCERTAIN BEHAVIOR OF LIVER: ICD-10-CM

## 2022-05-17 DIAGNOSIS — R93.5 ABNORMAL MRI OF ABDOMEN: ICD-10-CM

## 2022-05-17 DIAGNOSIS — Z90.410 H/O WHIPPLE PROCEDURE: ICD-10-CM

## 2022-05-17 DIAGNOSIS — K76.9 LIVER DISEASE: ICD-10-CM

## 2022-05-17 DIAGNOSIS — R10.9 ABDOMINAL PAIN, UNSPECIFIED ABDOMINAL LOCATION: ICD-10-CM

## 2022-05-17 DIAGNOSIS — Z90.49 H/O WHIPPLE PROCEDURE: ICD-10-CM

## 2022-05-17 DIAGNOSIS — C7B.8 METASTATIC MALIGNANT NEUROENDOCRINE TUMOR TO LIVER (HCC): ICD-10-CM

## 2022-05-17 DIAGNOSIS — D3A.8 PRIMARY PANCREATIC NEUROENDOCRINE TUMOR: ICD-10-CM

## 2022-05-17 LAB
BUN SERPL-MCNC: 14 MG/DL (ref 8–22)
CREAT SERPL-MCNC: 0.45 MG/DL (ref 0.5–1.4)
GFR SERPLBLD CREATININE-BSD FMLA CKD-EPI: 106 ML/MIN/1.73 M 2

## 2022-05-17 PROCEDURE — 84520 ASSAY OF UREA NITROGEN: CPT

## 2022-05-17 PROCEDURE — 82943 ASSAY OF GLUCAGON: CPT

## 2022-05-17 PROCEDURE — 99215 OFFICE O/P EST HI 40 MIN: CPT | Performed by: SURGERY

## 2022-05-17 PROCEDURE — 36415 COLL VENOUS BLD VENIPUNCTURE: CPT

## 2022-05-17 PROCEDURE — 82565 ASSAY OF CREATININE: CPT

## 2022-05-17 ASSESSMENT — ENCOUNTER SYMPTOMS: ABDOMINAL PAIN: 1

## 2022-05-17 ASSESSMENT — FIBROSIS 4 INDEX: FIB4 SCORE: 4.67

## 2022-05-22 LAB — GLUCAGON SERPL-MCNC: 730 NG/L

## 2022-06-14 DIAGNOSIS — E78.49 OTHER HYPERLIPIDEMIA: ICD-10-CM

## 2022-06-14 DIAGNOSIS — K21.9 GASTROESOPHAGEAL REFLUX DISEASE, UNSPECIFIED WHETHER ESOPHAGITIS PRESENT: ICD-10-CM

## 2022-06-14 DIAGNOSIS — F32.5 MAJOR DEPRESSIVE DISORDER IN REMISSION, UNSPECIFIED WHETHER RECURRENT (HCC): ICD-10-CM

## 2022-06-14 RX ORDER — ESOMEPRAZOLE MAGNESIUM 40 MG/1
40 CAPSULE, DELAYED RELEASE ORAL EVERY EVENING
Qty: 90 CAPSULE | Refills: 0 | Status: SHIPPED | OUTPATIENT
Start: 2022-06-14

## 2022-06-14 RX ORDER — SERTRALINE HYDROCHLORIDE 100 MG/1
150 TABLET, FILM COATED ORAL EVERY MORNING
Qty: 135 TABLET | Refills: 0 | Status: SHIPPED | OUTPATIENT
Start: 2022-06-14

## 2022-06-14 RX ORDER — ATORVASTATIN CALCIUM 10 MG/1
10 TABLET, FILM COATED ORAL EVERY EVENING
Qty: 90 TABLET | Refills: 0 | Status: SHIPPED | OUTPATIENT
Start: 2022-06-14

## 2022-07-05 ENCOUNTER — HOSPITAL ENCOUNTER (EMERGENCY)
Facility: MEDICAL CENTER | Age: 67
End: 2022-07-05
Attending: EMERGENCY MEDICINE
Payer: MEDICARE

## 2022-07-05 ENCOUNTER — APPOINTMENT (OUTPATIENT)
Dept: RADIOLOGY | Facility: MEDICAL CENTER | Age: 67
End: 2022-07-05
Attending: EMERGENCY MEDICINE
Payer: MEDICARE

## 2022-07-05 VITALS
TEMPERATURE: 99.5 F | OXYGEN SATURATION: 94 % | BODY MASS INDEX: 28.1 KG/M2 | HEART RATE: 92 BPM | WEIGHT: 148.81 LBS | RESPIRATION RATE: 18 BRPM | DIASTOLIC BLOOD PRESSURE: 74 MMHG | SYSTOLIC BLOOD PRESSURE: 118 MMHG | HEIGHT: 61 IN

## 2022-07-05 DIAGNOSIS — C22.7 OTHER SPECIFIED CARCINOMAS OF LIVER (HCC): ICD-10-CM

## 2022-07-05 DIAGNOSIS — C25.0 MALIGNANT NEOPLASM OF HEAD OF PANCREAS (HCC): ICD-10-CM

## 2022-07-05 LAB
ALBUMIN SERPL BCP-MCNC: 2.9 G/DL (ref 3.2–4.9)
ALBUMIN/GLOB SERPL: 0.7 G/DL
ALP SERPL-CCNC: 198 U/L (ref 30–99)
ALT SERPL-CCNC: 65 U/L (ref 2–50)
ANION GAP SERPL CALC-SCNC: 16 MMOL/L (ref 7–16)
APPEARANCE UR: CLEAR
AST SERPL-CCNC: 107 U/L (ref 12–45)
BACTERIA #/AREA URNS HPF: ABNORMAL /HPF
BASOPHILS # BLD AUTO: 0.3 % (ref 0–1.8)
BASOPHILS # BLD: 0.03 K/UL (ref 0–0.12)
BILIRUB SERPL-MCNC: 1.1 MG/DL (ref 0.1–1.5)
BILIRUB UR QL STRIP.AUTO: ABNORMAL
BUN SERPL-MCNC: 11 MG/DL (ref 8–22)
CALCIUM SERPL-MCNC: 8.8 MG/DL (ref 8.4–10.2)
CHLORIDE SERPL-SCNC: 92 MMOL/L (ref 96–112)
CO2 SERPL-SCNC: 24 MMOL/L (ref 20–33)
COLOR UR: YELLOW
CREAT SERPL-MCNC: 0.49 MG/DL (ref 0.5–1.4)
EOSINOPHIL # BLD AUTO: 0.05 K/UL (ref 0–0.51)
EOSINOPHIL NFR BLD: 0.4 % (ref 0–6.9)
EPI CELLS #/AREA URNS HPF: ABNORMAL /HPF
ERYTHROCYTE [DISTWIDTH] IN BLOOD BY AUTOMATED COUNT: 39.1 FL (ref 35.9–50)
GFR SERPLBLD CREATININE-BSD FMLA CKD-EPI: 104 ML/MIN/1.73 M 2
GLOBULIN SER CALC-MCNC: 4.4 G/DL (ref 1.9–3.5)
GLUCOSE SERPL-MCNC: 129 MG/DL (ref 65–99)
GLUCOSE UR STRIP.AUTO-MCNC: NEGATIVE MG/DL
HCT VFR BLD AUTO: 41.7 % (ref 37–47)
HGB BLD-MCNC: 14.3 G/DL (ref 12–16)
HYALINE CASTS #/AREA URNS LPF: ABNORMAL /LPF
IMM GRANULOCYTES # BLD AUTO: 0.1 K/UL (ref 0–0.11)
IMM GRANULOCYTES NFR BLD AUTO: 0.9 % (ref 0–0.9)
KETONES UR STRIP.AUTO-MCNC: >=80 MG/DL
LEUKOCYTE ESTERASE UR QL STRIP.AUTO: NEGATIVE
LIPASE SERPL-CCNC: 3 U/L (ref 7–58)
LYMPHOCYTES # BLD AUTO: 0.64 K/UL (ref 1–4.8)
LYMPHOCYTES NFR BLD: 5.6 % (ref 22–41)
MCH RBC QN AUTO: 30.3 PG (ref 27–33)
MCHC RBC AUTO-ENTMCNC: 34.3 G/DL (ref 33.6–35)
MCV RBC AUTO: 88.3 FL (ref 81.4–97.8)
MICRO URNS: ABNORMAL
MONOCYTES # BLD AUTO: 0.96 K/UL (ref 0–0.85)
MONOCYTES NFR BLD AUTO: 8.4 % (ref 0–13.4)
MUCOUS THREADS #/AREA URNS HPF: ABNORMAL /HPF
NEUTROPHILS # BLD AUTO: 9.64 K/UL (ref 2–7.15)
NEUTROPHILS NFR BLD: 84.4 % (ref 44–72)
NITRITE UR QL STRIP.AUTO: NEGATIVE
NRBC # BLD AUTO: 0 K/UL
NRBC BLD-RTO: 0 /100 WBC
PH UR STRIP.AUTO: 6 [PH] (ref 5–8)
PLATELET # BLD AUTO: 228 K/UL (ref 164–446)
PMV BLD AUTO: 9.7 FL (ref 9–12.9)
POTASSIUM SERPL-SCNC: 3.1 MMOL/L (ref 3.6–5.5)
PROT SERPL-MCNC: 7.3 G/DL (ref 6–8.2)
PROT UR QL STRIP: 30 MG/DL
RBC # BLD AUTO: 4.72 M/UL (ref 4.2–5.4)
RBC # URNS HPF: ABNORMAL /HPF
RBC UR QL AUTO: ABNORMAL
SODIUM SERPL-SCNC: 132 MMOL/L (ref 135–145)
SP GR UR STRIP.AUTO: >=1.03
WBC # BLD AUTO: 11.4 K/UL (ref 4.8–10.8)
WBC #/AREA URNS HPF: ABNORMAL /HPF

## 2022-07-05 PROCEDURE — 85025 COMPLETE CBC W/AUTO DIFF WBC: CPT

## 2022-07-05 PROCEDURE — 99284 EMERGENCY DEPT VISIT MOD MDM: CPT

## 2022-07-05 PROCEDURE — 36415 COLL VENOUS BLD VENIPUNCTURE: CPT

## 2022-07-05 PROCEDURE — 700117 HCHG RX CONTRAST REV CODE 255: Performed by: EMERGENCY MEDICINE

## 2022-07-05 PROCEDURE — 74160 CT ABDOMEN W/CONTRAST: CPT | Mod: MG

## 2022-07-05 PROCEDURE — 80053 COMPREHEN METABOLIC PANEL: CPT

## 2022-07-05 PROCEDURE — 81001 URINALYSIS AUTO W/SCOPE: CPT

## 2022-07-05 PROCEDURE — 83690 ASSAY OF LIPASE: CPT

## 2022-07-05 RX ORDER — ACETAMINOPHEN 500 MG
500-1000 TABLET ORAL EVERY 6 HOURS PRN
COMMUNITY

## 2022-07-05 RX ORDER — TRAMADOL HYDROCHLORIDE 50 MG/1
50 TABLET ORAL EVERY 4 HOURS PRN
Qty: 20 TABLET | Refills: 0 | Status: SHIPPED | OUTPATIENT
Start: 2022-07-05 | End: 2022-07-10

## 2022-07-05 RX ADMIN — IOHEXOL 100 ML: 350 INJECTION, SOLUTION INTRAVENOUS at 16:43

## 2022-07-05 ASSESSMENT — FIBROSIS 4 INDEX: FIB4 SCORE: 4.67

## 2022-07-05 NOTE — ED PROVIDER NOTES
ED Provider Note    CHIEF COMPLAINT  Chief Complaint   Patient presents with   • Abdominal Pain       HPI  Denise Gaines is a 66 y.o. female who presents for evaluation of abdominal pain reported poor appetite weight loss not feeling well.  The patient is a complex medical history as listed below.  She is followed by Dr. 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  Her Whipple procedure was 78 years ago.  Recent scan demonstrated recurrence of a liver lesion and she is currently not on any chemotherapy radiation and not a surgical candidate.  This was expectantly managed by her surgeon.  She reports no black or bloody stools    REVIEW OF SYSTEMS  See HPI for further details.  Patient has subjective fevers abdominal pain all other systems are negative.     PAST MEDICAL HISTORY  Past Medical History:   Diagnosis Date   • Cancer (HCC) 03/25/2022    liver cancer   • Bowel habit changes 03/25/2022    Diarrhea chronic/ fatty stools at times   • Cataract 10/11/2021    Bilateral-early stages   • Abnormal MRI of abdomen 10/6/2021   • Neuropathy 07/2021    viky hands and feet   • Type I diabetes mellitus (HCC) 4/5/2018   • Bronchitis 12/2017   • Arthritis 4/7/17    Bilateral shoulders and both hands-osteo   • MRSA (methicillin resistant Staphylococcus aureus) 1/30/2017    Back of left thigh-cleared.   • MRSA (methicillin resistant Staphylococcus aureus) 11/2016    Left hand-cleared    • Carotid artery plaque 2016    per ultrasound   • Obstruction of bile duct 1/27/2014   • Diabetes (HCC) 2014    post whipple procedure/ uses humalog, insuliln pump   • Pneumonia 2012    gets a pneumovax   • Adverse effect of anesthesia     Pt was very sick after surgery on 8/4/2021 under a local    • Anesthesia     PONV/ slow to emerge per pt/ pt with hx of motion sickness   • ASTHMA     allergy induced-uses inhalers prn/flonase   • Blood clotting disorder (HCC) dx 10/2016    Right eye  retina-Treated with laser and injections   • Cancer (HCC) 12/2013-dx    pancreatic CA. Whipple procedure 1/2014   • Colitis since age 14   • Delayed emergence from general anesthesia    • Heart burn     treated with nexium   • Heart murmur     since birth, slight   • High cholesterol     preventitive for plack in the retina   • Indigestion     takes creon digestive enzymes   • Osteoporosis    • PONV (postoperative nausea and vomiting)     Pt was very sick after surgery on 8/4/2021. pt with hx motion sickness   • Psychiatric problem     depression/ anxiety    • Snoring     sinus issues   • Unspecified urinary incontinence     uses pad   • Urinary bladder disorder     incontinence       FAMILY HISTORY  Noncontributory    SOCIAL HISTORY  Social History     Socioeconomic History   • Marital status: Single   Tobacco Use   • Smoking status: Never Smoker   • Smokeless tobacco: Never Used   Vaping Use   • Vaping Use: Never used   Substance and Sexual Activity   • Alcohol use: Yes     Alcohol/week: 0.6 oz     Types: 1 Cans of beer per week     Comment: occasionally   • Drug use: Yes     Types: Marijuana     Comment: does use CBD oil topical/with THC occasional orally  - last used weeks ago since 11/22/21       SURGICAL HISTORY  Past Surgical History:   Procedure Laterality Date   • HEPATIC ABLATION LAPAROSCOPIC Bilateral 11/22/2021    Procedure: LESION, LIVER, DIAGNOSTIC LAPAROSCOPIC - LOBE intra operative ultrasound;  Surgeon: Shahbaz Jang M.D.;  Location: SURGERY [facility];  Service: General   • PB INCISE FINGER TENDON SHEATH Right 8/4/2021    Procedure: RELEASE, TRIGGER FINGER - THUMB, A1 UNRULY.;  Surgeon: Drew Duong M.D.;  Location: SURGERY [facility];  Service: Orthopedics   • MD SHLDR ARTHROSCOP,PART ACROMIOPLAS Right 1/10/2020    Procedure: DECOMPRESSION, SHOULDER, ARTHROSCOPIC - SUBACROMIAL W/LABRAL DEBRIDEMENT, SUPERIOR CAPSULAR RECONSTRUCTION, VALLADARES;  Surgeon: Mei Palacios M.D.;   Location: [facility];  Service: Orthopedics   • BICEPS TENODESIS Right 1/10/2020    Procedure: TENODESIS, BICEPS;  Surgeon: Mei Palacios M.D.;  Location: [facility];  Service: Orthopedics   • ROTATOR CUFF REPAIR Right 1/10/2020    Procedure: REPAIR, ROTATOR CUFF;  Surgeon: Mei Palacios M.D.;  Location: [facility];  Service: Orthopedics   • EGD W/ENDOSCOPIC ULTRASOUND  3/15/2018    Procedure: EGD W/ENDOSCOPIC ULTRASOUND;  Surgeon: Edward Rubin M.D.;  Location: [facility];  Service: EUS   • EGD WITH ASP/BX  3/15/2018    Procedure: EGD WITH ASP/BX;  Surgeon: Edward Rubin M.D.;  Location: [facility];  Service: EUS   • GASTROSCOPY  3/15/2018    Procedure: GASTROSCOPY - POSS BIOPSY, DILATION, POLYPECTOMY, CONTROL OF HEMORRHAGE;  Surgeon: Edward Rubin M.D.;  Location: [facility];  Service: EUS   • EGD W/ENDOSCOPIC ULTRASOUND N/A 4/13/2017    Procedure: EGD W/ENDOSCOPIC ULTRASOUND;  Surgeon: Edward Rubin M.D.;  Location: [facility];  Service:    • GASTROSCOPY N/A 4/13/2017    Procedure: GASTROSCOPY - W/POSS BIOPSY, DILATION, POLYPECTOMY, CONTROL OF HEMORRHAGE;  Surgeon: Edward Rubin M.D.;  Location: [facility];  Service:    • COLONOSCOPY  2016    x 2   • ERCP-DIAGNOSTIC W/BIOPSY  8/2015    several to verify no return of pancreatic CA    • HERNIA REPAIR  7/2014    5 umbilical repaired at same time   • LAPAROSCOPY  1/27/2014    Performed by Shahbaz Jang M.D. at [facility]   • NODE DISSECTION  1/27/2014    Performed by Shahbaz Jang M.D. at [facility]   • WHIPPLE PROCEDURE  1/27/2014    Performed by Shahbaz Jang M.D. at [facility]   • TONSILLECTOMY  2011   • OTHER ORTHOPEDIC SURGERY Left 2009    Nerve damage S/P lumbar surgery-foot broken and pinned.    • LUMBAR LAMINECTOMY DISKECTOMY  2008    L5   • SHOULDER  "ARTHROSCOPY W/ ROTATOR CUFF REPAIR Right 2006   • CARPAL TUNNEL RELEASE Right 2005   • BLADDER SUSPENSION  2003   • HYSTERECTOMY, TOTAL ABDOMINAL  1990       CURRENT MEDICATIONS  Home Medications     Reviewed by Bharat Greene (Pharmacy Tech) on 07/05/22 at 1557  Med List Status: Partial   Medication Last Dose Status   acetaminophen (TYLENOL) 500 MG Tab > 3 days Active   atorvastatin (LIPITOR) 10 MG Tab 7/4/2022 Active   B Complex Vitamins (B COMPLEX 1 PO) 7/4/2022 Active   EPINEPHrine (EPIPEN) 0.3 MG/0.3ML Solution Auto-injector solution for injection > 3 years Active   esomeprazole (NEXIUM) 40 MG delayed-release capsule 7/4/2022 Active   fluticasone (FLONASE) 50 MCG/ACT nasal spray 7/4/2022 Active   guaiFENesin ER (MUCINEX) 600 MG TABLET SR 12 HR 7/5/2022 Active   insulin infusion pump Device CONTINUOUS Active   Multiple Minerals-Vitamins (CALCIUM-MAGNESIUM-ZINC-D3) Tab 7/4/2022 Active   Multiple Vitamins-Minerals (MULTIVITAMIN ADULT PO) 7/5/2022 Active   Pancrelipase, Lip-Prot-Amyl, 56037-775868 units Cap DR Particles 7/5/2022 Active   Probiotic Product (PROBIOTIC ADVANCED PO) 7/4/2022 Active   sertraline (ZOLOFT) 100 MG Tab 7/5/2022 Active   Thiamine HCl (B-1 PO) 7/4/2022 Active                ALLERGIES  Allergies   Allergen Reactions   • Bee Anaphylaxis     Wasps   • Clindamycin Hives and Rash     Head to toe rash like chicken pox for 4 months.   • Penicillins Anaphylaxis   • Sulfa Drugs Hives and Itching     Rash hives itching   • Tape Rash     Rash and welt at site  PAPER TAPE OK   • Codeine Vomiting   • Percocet [Oxycodone-Acetaminophen] Vomiting       PHYSICAL EXAM  VITAL SIGNS: /74   Pulse 92   Temp 36.8 °C (98.2 °F) (Temporal)   Resp 18   Ht 1.549 m (5' 1\")   Wt 67.5 kg (148 lb 13 oz)   LMP  (LMP Unknown)   SpO2 93%   BMI 28.12 kg/m²       Constitutional: Elderly  HENT: Normocephalic, Atraumatic, Bilateral external ears normal, Oropharynx moist, No oral exudates, Nose normal.   Eyes: " PERRLA, EOMI, Conjunctiva normal, No discharge.   Neck: Normal range of motion, No tenderness, Supple, No stridor.   Cardiovascular: Normal heart rate, Normal rhythm, No murmurs, No rubs, No gallops.   Thorax & Lungs: Normal breath sounds, No respiratory distress, No wheezing, No chest tenderness.   Abdomen: Bowel sounds normal, Soft, mild epigastric discomfort no masses, No pulsatile masses.   Skin: Warm, Dry, No erythema, No rash.   Back: No tenderness, No CVA tenderness.   Extremities: Intact distal pulses, No edema, No tenderness, No cyanosis, No clubbing.   Musculoskeletal: Good range of motion in all major joints. No tenderness to palpation or major deformities noted.   Neurologic: Alert & oriented x 3, Normal motor function, Normal sensory function, No focal deficits noted.   Psychiatric: Affect normal, Judgment normal, Mood normal.       CT-ABDOMEN LIVER FOR HEPATIC MASS (CIRRHOSIS)   Preliminary Result         1.  Posttreatment changes in the right hepatic lobe from prior radio embolization. Heterogeneously enhancing mass in the right hepatic lobe is consistent with recurrent tumor/progression of disease.      2.  Stable hyperenhancing lesion in segment 8 of the liver.      3.  Status post Whipple.                    Results for orders placed or performed during the hospital encounter of 07/05/22   CBC with Differential   Result Value Ref Range    WBC 11.4 (H) 4.8 - 10.8 K/uL    RBC 4.72 4.20 - 5.40 M/uL    Hemoglobin 14.3 12.0 - 16.0 g/dL    Hematocrit 41.7 37.0 - 47.0 %    MCV 88.3 81.4 - 97.8 fL    MCH 30.3 27.0 - 33.0 pg    MCHC 34.3 33.6 - 35.0 g/dL    RDW 39.1 35.9 - 50.0 fL    Platelet Count 228 164 - 446 K/uL    MPV 9.7 9.0 - 12.9 fL    Neutrophils-Polys 84.40 (H) 44.00 - 72.00 %    Lymphocytes 5.60 (L) 22.00 - 41.00 %    Monocytes 8.40 0.00 - 13.40 %    Eosinophils 0.40 0.00 - 6.90 %    Basophils 0.30 0.00 - 1.80 %    Immature Granulocytes 0.90 0.00 - 0.90 %    Nucleated RBC 0.00 /100 WBC     Neutrophils (Absolute) 9.64 (H) 2.00 - 7.15 K/uL    Lymphs (Absolute) 0.64 (L) 1.00 - 4.80 K/uL    Monos (Absolute) 0.96 (H) 0.00 - 0.85 K/uL    Eos (Absolute) 0.05 0.00 - 0.51 K/uL    Baso (Absolute) 0.03 0.00 - 0.12 K/uL    Immature Granulocytes (abs) 0.10 0.00 - 0.11 K/uL    NRBC (Absolute) 0.00 K/uL   Complete Metabolic Panel   Result Value Ref Range    Sodium 132 (L) 135 - 145 mmol/L    Potassium 3.1 (L) 3.6 - 5.5 mmol/L    Chloride 92 (L) 96 - 112 mmol/L    Co2 24 20 - 33 mmol/L    Anion Gap 16.0 7.0 - 16.0    Glucose 129 (H) 65 - 99 mg/dL    Bun 11 8 - 22 mg/dL    Creatinine 0.49 (L) 0.50 - 1.40 mg/dL    Calcium 8.8 8.4 - 10.2 mg/dL    AST(SGOT) 107 (H) 12 - 45 U/L    ALT(SGPT) 65 (H) 2 - 50 U/L    Alkaline Phosphatase 198 (H) 30 - 99 U/L    Total Bilirubin 1.1 0.1 - 1.5 mg/dL    Albumin 2.9 (L) 3.2 - 4.9 g/dL    Total Protein 7.3 6.0 - 8.2 g/dL    Globulin 4.4 (H) 1.9 - 3.5 g/dL    A-G Ratio 0.7 g/dL   Lipase   Result Value Ref Range    Lipase 3 (L) 7 - 58 U/L   Urinalysis    Specimen: Urine   Result Value Ref Range    Color Yellow     Character Clear     Specific Gravity >=1.030 <1.035    Ph 6.0 5.0 - 8.0    Glucose Negative Negative mg/dL    Ketones >=80 (A) Negative mg/dL    Protein 30 (A) Negative mg/dL    Bilirubin Moderate (A) Negative    Nitrite Negative Negative    Leukocyte Esterase Negative Negative    Occult Blood Trace (A) Negative    Micro Urine Req Microscopic    URINE MICROSCOPIC (W/UA)   Result Value Ref Range    WBC Rare /hpf    RBC 0-2 /hpf    Bacteria Few (A) None /hpf    Epithelial Cells Rare Few /hpf    Mucous Threads Few /hpf    Hyaline Cast 0-2 /lpf   ESTIMATED GFR   Result Value Ref Range    GFR (CKD-EPI) 104 >60 mL/min/1.73 m 2         COURSE & MEDICAL DECISION MAKING  Pertinent Labs & Imaging studies reviewed. (See chart for details)  An IV was established.  The patient declined any parenteral nausea or pain medication.  Reviewed her extensive records including her clinic notes from  her surgeon, recent laboratory and imaging studies.  Here her laboratory studies are not overtly abnormal.  Her bilirubin is normal she has no anemia or bandemia.  She has some mild elevation in transaminases but no suggestion of fulminant hepatic failure.  I counseled the patient that we did not find any acute emergent process that would merit hospitalization antibiotics surgery etc.  We will place a referral back to her surgeon for ongoing management.  I counseled her to return as needed for new or worsening symptoms    FINAL IMPRESSION  1.  Neuroendocrine tumor of the liver, recurrence         Electronically signed by: Kristofer Gill M.D., 7/5/2022 3:04 PM

## 2022-07-05 NOTE — ED TRIAGE NOTES
Pt amb to triage c/o abd pain and intermittent 'excessive sweating' over past week. Pt has hx liver cancer; not currently receiving tx.

## 2022-07-05 NOTE — ED NOTES
Med rec updated and complete, per pt   Allergies reviewed, per pt   Interviewed pt with daughter in law at bedside with permission from pt.  Pts has an insulin pump, she is not sure how to see her bolus or basal, if pt gets admitted will have pharmacist look at pts insulin pump

## 2022-07-06 ENCOUNTER — TELEPHONE (OUTPATIENT)
Dept: SURGICAL ONCOLOGY | Facility: MEDICAL CENTER | Age: 67
End: 2022-07-06
Payer: MEDICARE

## 2022-07-06 ENCOUNTER — TELEPHONE (OUTPATIENT)
Dept: MEDICAL GROUP | Facility: PHYSICIAN GROUP | Age: 67
End: 2022-07-06
Payer: MEDICARE

## 2022-07-06 NOTE — TELEPHONE ENCOUNTER
Pt left  yesterday (7/5/2022) saying her temp has been 100 to 103 since Wednesday and isn't feeling well. Wants advise on what she should do. Pt ended up going to the ER.

## 2022-07-06 NOTE — TELEPHONE ENCOUNTER
1026 07/06/22  Pt called and identity was verified. Pt went to the ER, got labs done, and liver CT. Pt wanted to make an appointment to follow up with Dr. Jang. Per Anne-Marie Pt was scheduled for July 19th at 0930.  Chance PELLETIER

## 2022-07-06 NOTE — ED NOTES
Pt provided with discharge paper work. Pt declines questions. Pt to ambulate out of Er with ride home.

## 2022-07-19 ENCOUNTER — TELEPHONE (OUTPATIENT)
Dept: SURGICAL ONCOLOGY | Facility: MEDICAL CENTER | Age: 67
End: 2022-07-19

## 2022-07-19 ENCOUNTER — APPOINTMENT (OUTPATIENT)
Dept: SURGICAL ONCOLOGY | Facility: MEDICAL CENTER | Age: 67
End: 2022-07-19
Payer: MEDICARE

## 2022-07-19 DIAGNOSIS — C7B.8 METASTATIC MALIGNANT NEUROENDOCRINE TUMOR TO LIVER (HCC): ICD-10-CM

## 2022-07-19 DIAGNOSIS — K76.9 LIVER DISEASE: ICD-10-CM

## 2022-07-19 NOTE — TELEPHONE ENCOUNTER
M for patient to call back  about appointment today.  wants to send her for a liver biopsy and would like her to get that done before coming into clinic.

## 2022-07-26 ENCOUNTER — TELEPHONE (OUTPATIENT)
Dept: SURGICAL ONCOLOGY | Facility: MEDICAL CENTER | Age: 67
End: 2022-07-26
Payer: MEDICARE

## (undated) DEVICE — NEPTUNE 4 PORT MANIFOLD - (20/PK)

## (undated) DEVICE — PACK LAP CHOLE OR - (2EA/CA)

## (undated) DEVICE — SITE INJ 7/8IN IV M LL ADPR - (100/CA) THIS IS A CUSTOM ITEM AND HAS A 30 DAY LEAD TIME

## (undated) DEVICE — GLOVE BIOGEL SZ 8 SURGICAL PF LTX - (50PR/BX 4BX/CA)

## (undated) DEVICE — GOWN WARMING STANDARD FLEX - (30/CA)

## (undated) DEVICE — MAIN PUMP TUBING 13FT

## (undated) DEVICE — SLEEVE, VASO, THIGH, MED

## (undated) DEVICE — GLOVE BIOGEL INDICATOR SZ 7.5 SURGICAL PF LTX - (50PR/BX 4BX/CA)

## (undated) DEVICE — PACK SHOULDER ARTHROSCOPY SM - (2EA/CA)

## (undated) DEVICE — SPONGE GAUZESTER. 2X2 4-PL - (2/PK 50PK/BX 30BX/CS)

## (undated) DEVICE — GLOVE BIOGEL PI ORTHO SZ 6 SURGICAL PF LF (40PR/BX)

## (undated) DEVICE — CANNULA W/ SUPPLY TUBING O2 - (50/CA)

## (undated) DEVICE — CATHETER IV SAFETY 22 GA X 1 (50EA/BX)

## (undated) DEVICE — ELECTRODE DUAL RETURN W/ CORD - (50/PK)

## (undated) DEVICE — DRAPE IOBAN II INCISE 23X17 - (10EA/BX 4BX/CA)

## (undated) DEVICE — Device

## (undated) DEVICE — GLOVE, LITE (PAIR)

## (undated) DEVICE — DRAPE U SPLIT IMP 54 X 76 - (24/CA)

## (undated) DEVICE — GLOVE BIOGEL INDICATOR SZ 8 SURGICAL PF LTX - (50/BX 4BX/CA)

## (undated) DEVICE — SUTURE GENERAL

## (undated) DEVICE — LACTATED RINGERS INJ 1000 ML - (14EA/CA 60CA/PF)

## (undated) DEVICE — TUBE CONNECTING SUCTION - CLEAR PLASTIC STERILE 72 IN (50EA/CA)

## (undated) DEVICE — HEAD HOLDER JUNIOR/ADULT

## (undated) DEVICE — SET EXTENSION WITH 2 PORTS (48EA/CA) ***PART #2C8610 IS A SUBSTITUTE*****

## (undated) DEVICE — GOWN SURGICAL X-LARGE ULTRA - FILM-REINFORCED (20/CA)

## (undated) DEVICE — MASK ANESTHESIA ADULT  - (100/CA)

## (undated) DEVICE — PAD PREP 24 X 48 CUFFED - (100/CA)

## (undated) DEVICE — KIT ANESTHESIA W/CIRCUIT & 3/LT BAG W/FILTER (20EA/CA)

## (undated) DEVICE — SPONGE GAUZE NON-STERILE 4X4 - (2000/CA 10PK/CA)

## (undated) DEVICE — FORCEP RADIAL JAW 4 STANDARD CAPACITY W/NEEDLE 240CM (40EA/BX)

## (undated) DEVICE — CANISTER SUCTION RIGID RED 1500CC (40EA/CA)

## (undated) DEVICE — SENSOR SPO2 ADULT LNCS ADTX (20/BX) ORDER ITEM #19593

## (undated) DEVICE — MASK WITH FACE SHIELD (25/BX 4BX/CA)

## (undated) DEVICE — DRESSING TRANSPARENT FILM TEGADERM 2.375 X 2.75"  (100EA/BX)"

## (undated) DEVICE — TUBING CLEARLINK DUO-VENT - C-FLO (48EA/CA)

## (undated) DEVICE — SYRINGE DISP. 50CC LS - (40/BX)

## (undated) DEVICE — GLOVE BIOGEL PI INDICATOR SZ 6.0 SURGICAL PF LF -(200PR/CA)

## (undated) DEVICE — BLADE SURGICAL #15 - (50/BX 3BX/CA)

## (undated) DEVICE — BAG, SPONGE COUNT 50600

## (undated) DEVICE — BAG SPONGE COUNT 10.25 X 32 - BLUE (250/CA)

## (undated) DEVICE — GLOVE BIOGEL SZ 7 SURGICAL PF LTX - (50PR/BX 4BX/CA)

## (undated) DEVICE — CANNULA W/SEAL 5X100 Z-THRE - ADED KII (12/BX)

## (undated) DEVICE — BLADE BEAVER 6400 MINI EYE ROUND TIP SHARP ON ONE SIDE (20/CA)

## (undated) DEVICE — PROTECTOR ULNA NERVE - (36PR/CA)

## (undated) DEVICE — SYRINGE SAFETY 10 ML 18 GA X 1 1/2 BLUNT LL (100/BX 4BX/CA)

## (undated) DEVICE — NEEDLE W/FACET TIP DULL VERSION W/STIMULATION CABLE SONOPLEX 21G X 4 (10/EA)"

## (undated) DEVICE — SET LEADWIRE 5 LEAD BEDSIDE DISPOSABLE ECG (1SET OF 5/EA)

## (undated) DEVICE — DRAPE SHOULDER FLUID CONTROL - 77 X 85 (10/CA)

## (undated) DEVICE — BITE BLOCK ADULT 60FR (100EA/CA)

## (undated) DEVICE — SENSOR SPO2 NEO LNCS ADHESIVE (20/BX) SEE USER NOTES

## (undated) DEVICE — BASIN EMESIS DISP. - (250/CA)

## (undated) DEVICE — CATHETER IV SAFETY 20 GA X 1-1/4 (50/BX)

## (undated) DEVICE — GOWN SURGEONS LARGE - (32/CA)

## (undated) DEVICE — SUTURE 3-0 ETHILON FS-1 - (36/BX) 30 INCH

## (undated) DEVICE — SODIUM CHL IRRIGATION 0.9% 1000ML (12EA/CA)

## (undated) DEVICE — GLOVE BIOGEL PI INDICATOR SZ 7.0 SURGICAL PF LF - (50/BX 4BX/CA)

## (undated) DEVICE — TROCAR 5X100 NON BLADED Z-TH - READ KII (6/BX)

## (undated) DEVICE — SODIUM CHL. IRRIGATION 0.9% 3000ML (4EA/CA 65CA/PF)

## (undated) DEVICE — SYRINGE ASEPTO - (50EA/CA

## (undated) DEVICE — MASK, LARYNGEAL AIRWAY #4

## (undated) DEVICE — ELECTRODE 850 FOAM ADHESIVE - HYDROGEL RADIOTRNSPRNT (50/PK)

## (undated) DEVICE — SYRINGE SAFETY 3 ML 18 GA X 1 1/2 BLUNT LL (100/BX 8BX/CA)

## (undated) DEVICE — SUTURE 4-0 ETHILON PS-2 18 (12PK/BX)"

## (undated) DEVICE — DRESSING ABDOMINAL PAD STERILE 8 X 10" (360EA/CA)"

## (undated) DEVICE — TAPE CLOTH MEDIPORE 6 INCH - (12RL/CA)

## (undated) DEVICE — SUCTION INSTRUMENT YANKAUER BULBOUS TIP W/O VENT (50EA/CA)

## (undated) DEVICE — WATER IRRIGATION STERILE 1000ML (12EA/CA)

## (undated) DEVICE — STAPLER SKIN DISP - (6/BX 10BX/CA) VISISTAT

## (undated) DEVICE — TOWEL STOP TIMEOUT SAFETY FLAG (40EA/CA)

## (undated) DEVICE — HANDPIECE THUNDERBEAT 5MM 45CM FRONT GRIP TYPE S (5EA/BX)

## (undated) DEVICE — PACK UPPER EXTREMITY SM OR - (3/CA)

## (undated) DEVICE — HUMID-VENT HEAT AND MOISTURE EXCHANGE- (50/BX)

## (undated) DEVICE — HANDPIECE THUNDERBEAT 5MM 35CM FRONT GRIP TYPE S (5EA/BX)

## (undated) DEVICE — SHAVER4.0 AGGRESSIVE + FORMLA (5EA/BX)

## (undated) DEVICE — TUBE CONNECT SUCTION CLEAR 120 X 1/4" (50EA/CA)"

## (undated) DEVICE — ABLATOR WAND SERFAS 90-S CRUISE

## (undated) DEVICE — DRESSING XEROFORM 1X8 - (50/BX 4BX/CA)

## (undated) DEVICE — SUTURE 0 VICRYL PLUS UR-6 - 27 INCH (36/BX)

## (undated) DEVICE — SHAVER, 5.5 RESECTOR

## (undated) DEVICE — TROCAR Z THREAD12MM OPTICAL - NON BLADED (6/BX)

## (undated) DEVICE — SUTURE 2-0 VICRYL PLUS CT-2 - 27 INCH (36/BX)

## (undated) DEVICE — SYRINGE SAFETY 5 ML 18 GA X 1-1/2 BLUNT LL (100/BX 4BX/CA)

## (undated) DEVICE — KIT  I.V. START (100EA/CA)

## (undated) DEVICE — DRAPE LARGE 3 QUARTER - (20/CA)

## (undated) DEVICE — CHLORAPREP 26 ML APPLICATOR - ORANGE TINT(25/CA)

## (undated) DEVICE — LOCAL

## (undated) DEVICE — STERI STRIP COMPOUND BENZOIN - TINCTURE 0.6ML WITH APPLICATOR (40EA/BX)

## (undated) DEVICE — CANNULA THREADED 5X75 (5EA/BX)

## (undated) DEVICE — SLEEVE SHOULDER DISP(ARTHREX) - (6/BX)

## (undated) DEVICE — SET TUBING PNEUMOCLEAR HIGH FLOW SMOKE EVACUATION (10EA/BX)

## (undated) DEVICE — GLOVE SZ 6.5 BIOGEL PI MICRO - PF LF (50PR/BX)

## (undated) DEVICE — TUBE SUCTION YANKAUER  1/4 X 6FT (20EA/CA)"

## (undated) DEVICE — CANISTER SUCTION 3000ML MECHANICAL FILTER AUTO SHUTOFF MEDI-VAC NONSTERILE LF DISP  (40EA/CA)

## (undated) DEVICE — KIT ROOM DECONTAMINATION

## (undated) DEVICE — SPONGE GAUZESTER 4 X 4 4PLY - (128PK/CA)

## (undated) DEVICE — PACK LOWER EXTREMITY - (2/CA)

## (undated) DEVICE — DRAPESURG STERI-DRAPE LONG - (10/BX 4BX/CA)

## (undated) DEVICE — GLOVE 7.0 LF PF PROTEXIS (50PR/BX)